# Patient Record
Sex: FEMALE | Race: WHITE | NOT HISPANIC OR LATINO | Employment: FULL TIME | URBAN - METROPOLITAN AREA
[De-identification: names, ages, dates, MRNs, and addresses within clinical notes are randomized per-mention and may not be internally consistent; named-entity substitution may affect disease eponyms.]

---

## 2017-01-20 ENCOUNTER — LAB CONVERSION - ENCOUNTER (OUTPATIENT)
Dept: OTHER | Facility: OTHER | Age: 42
End: 2017-01-20

## 2017-01-20 LAB
25(OH)D3 SERPL-MCNC: 18 NG/ML (ref 30–100)
TSH SERPL DL<=0.05 MIU/L-ACNC: 2.68 MIU/L

## 2017-01-24 ENCOUNTER — GENERIC CONVERSION - ENCOUNTER (OUTPATIENT)
Dept: OTHER | Facility: OTHER | Age: 42
End: 2017-01-24

## 2017-02-01 ENCOUNTER — ALLSCRIPTS OFFICE VISIT (OUTPATIENT)
Dept: OTHER | Facility: OTHER | Age: 42
End: 2017-02-01

## 2017-03-29 ENCOUNTER — ALLSCRIPTS OFFICE VISIT (OUTPATIENT)
Dept: OTHER | Facility: OTHER | Age: 42
End: 2017-03-29

## 2017-04-05 ENCOUNTER — TRANSCRIBE ORDERS (OUTPATIENT)
Dept: ADMINISTRATIVE | Facility: HOSPITAL | Age: 42
End: 2017-04-05

## 2017-04-05 DIAGNOSIS — R92.8 ABNORMAL MAMMOGRAM, UNSPECIFIED: ICD-10-CM

## 2017-04-05 DIAGNOSIS — Z12.31 VISIT FOR SCREENING MAMMOGRAM: Primary | ICD-10-CM

## 2017-05-05 ENCOUNTER — HOSPITAL ENCOUNTER (OUTPATIENT)
Dept: MAMMOGRAPHY | Facility: CLINIC | Age: 42
Discharge: HOME | End: 2017-05-05
Payer: COMMERCIAL

## 2017-05-05 ENCOUNTER — HOSPITAL ENCOUNTER (OUTPATIENT)
Dept: ULTRASOUND IMAGING | Facility: CLINIC | Age: 42
Discharge: HOME | End: 2017-05-05
Payer: COMMERCIAL

## 2017-05-05 DIAGNOSIS — R92.8 OTHER ABNORMAL AND INCONCLUSIVE FINDINGS ON DIAGNOSTIC IMAGING OF BREAST: ICD-10-CM

## 2017-05-05 DIAGNOSIS — Z12.31 ENCOUNTER FOR SCREENING MAMMOGRAM FOR MALIGNANT NEOPLASM OF BREAST: ICD-10-CM

## 2017-05-05 PROCEDURE — G0206 DX MAMMO INCL CAD UNI: HCPCS

## 2017-05-05 PROCEDURE — 76642 ULTRASOUND BREAST LIMITED: CPT

## 2017-05-05 PROCEDURE — G0202 SCR MAMMO BI INCL CAD: HCPCS

## 2017-06-01 ENCOUNTER — GENERIC CONVERSION - ENCOUNTER (OUTPATIENT)
Dept: OTHER | Facility: OTHER | Age: 42
End: 2017-06-01

## 2017-12-30 ENCOUNTER — LAB CONVERSION - ENCOUNTER (OUTPATIENT)
Dept: OTHER | Facility: OTHER | Age: 42
End: 2017-12-30

## 2017-12-30 LAB — 25(OH)D3 SERPL-MCNC: 20 NG/ML (ref 30–100)

## 2018-01-13 VITALS
BODY MASS INDEX: 42.03 KG/M2 | DIASTOLIC BLOOD PRESSURE: 90 MMHG | OXYGEN SATURATION: 91 % | HEART RATE: 98 BPM | HEIGHT: 59 IN | WEIGHT: 208.5 LBS | SYSTOLIC BLOOD PRESSURE: 140 MMHG | RESPIRATION RATE: 16 BRPM | TEMPERATURE: 98.3 F

## 2018-01-13 NOTE — MISCELLANEOUS
Message   Recorded as Task   Date: 06/01/2017 01:32 PM, Created By: Sara Hawkins   Task Name: Med Renewal Request   Assigned To: Honorio Ryan   Regarding Patient: Izaiah Andrew, Status: Active   CommentEsmond Marchi - 01 Jun 2017 1:32 PM     TASK CREATED  Caller: Self; [phone] (Home); [phone] (Work)  rx cfor depo called to [employer] Problems    1  Asthma (493 90) (J45 909)   2  Classic migraine with aura (346 00) (G43 109)   3  Dyslipidemia (272 4) (E78 5)   4  Encounter for gynecological examination without abnormal finding (V72 31) (Z01 419)   5  Encounter for PPD test (V74 1) (Z11 1)   6  Encounter for screening mammogram for malignant neoplasm of breast (V76 12)   (Z12 31)   7  Factor V Leiden mutation (289 81) (D68 51)   8  HTN (hypertension) (401 9) (I10)   9  Hyperglycemia (790 29) (R73 9)   10  Morbid or severe obesity due to excess calories (278 01) (E66 01)   11  Need for immunization against influenza (V04 81) (Z23)   12  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 629)   13  Vitamin D deficiency (268 9) (E55 9)   14  Weight gain (783 1) (R63 5)    Current Meds   1  Calcium Citrate +D 315-250 MG-UNIT Oral Tablet; 2 TABS BID; Therapy: 44Eoy4341 to (Last Rx:40Ajf7711)  Requested for: 67Jtr9192 Ordered   2  MedroxyPROGESTERone Acetate 150 MG/ML Intramuscular Suspension   (Depo-Provera); INJECT 1 ML INTRAMUSCULARLY ONCE EVERY 3   MONTHS; Therapy: 47DXL0725 to (Last Rx:08Mar2016)  Requested for: 37XGM4807 Ordered   3  Naproxen 500 MG Oral Tablet; TAKE 1 TABLET TWICE DAILY AS NEEDED; Therapy: 93LOZ0537 to (Evaluate:12Oct2016)  Requested for: 64UWU5857; Last   Rx:85Duw9903 Ordered   4  ProAir  (90 Base) MCG/ACT Inhalation Aerosol Solution; INHALE 2 PUFFS   EVERY 4 HOURS AS NEEDED FOR COUGH AND WHEEZE;   Therapy: 89SYQ7524 to (Evaluate:86Eql3369)  Requested for: 40WBN9230; Last   Rx:55Ovn8019 Ordered   5  Red Yeast Rice 600 MG Oral Tablet;    Therapy: 38OBI6033 to Recorded   6  Vitamin D (Ergocalciferol) 13897 UNIT Oral Capsule; TAKE 1 CAPSULE WEEKLY; Therapy: 69PWX0907 to (Raj Alex)  Requested for: 13BXH3675; Last   Rx:01Feb2017 Ordered   7  Vitamin D3 1000 UNIT Oral Tablet; 3 Tabs Daily; Therapy: 94GKS0384 to (Last Rx:01Feb2017)  Requested for: 01Feb2017 Ordered    Allergies    1  Dilantin CAPS   2  Keflex TABS   3  Ansaid TABS   4   TEGretol TABS    Signatures   Electronically signed by : Job Wetzel, ; Jun 1 2017  1:32PM EST                       (Author)

## 2018-01-14 VITALS
SYSTOLIC BLOOD PRESSURE: 134 MMHG | BODY MASS INDEX: 42.33 KG/M2 | WEIGHT: 210 LBS | HEIGHT: 59 IN | DIASTOLIC BLOOD PRESSURE: 82 MMHG

## 2018-01-16 NOTE — MISCELLANEOUS
Message   Recorded as Task   Date: 09/30/2016 02:08 PM, Created By: Alyssa Issa   Task Name: Go to Result   Assigned To: Inés [facility],Team   Regarding Patient: Neelam Brooks, Status: In Progress   Comment:    Rachelle Rebollar - 30 Sep 2016 2:08 PM     TASK CREATED  right dx mammo and US in six months  left screening mammo in six months    please send slip  thx   Olivia Bueno - 30 Sep 2016 2:27 PM     TASK IN PROGRESS   Olivia Bueno - 30 Sep 2016 2:56 PM     TASK EDITED           unable to reach pt re teodoro  no vm    order placed in allscripts for 6 month f/u rt diag teodoro and u/s, and 6 mo f/u rt screenuing teodoro        Active Problems    1  Acute medial meniscus tear of left knee (836 0) (S83 242A)   2  Acute vaginitis (616 10) (N76 0)   3  Arthralgia (719 40) (M25 50)   4  Asthma (493 90) (J45 909)   5  Classic migraine with aura (346 00) (G43 109)   6  Dyslipidemia (272 4) (E78 5)   7  Encounter for gynecological examination without abnormal finding (V72 31) (Z01 419)   8  Encounter for screening mammogram for malignant neoplasm of breast (V76 12)   (Z12 31)   9  Factor V Leiden mutation (289 81) (D68 51)   10  Fracture of patella (822 0) (S82 009A)   11  HTN (hypertension) (401 9) (I10)   12  Hyperglycemia (790 29) (R73 9)   13  Iliotibial band syndrome of left side (728 89) (M76 32)   14  Left knee pain (719 46) (M25 562)   15  Mammogram abnormal (793 80) (R92 8)   16  Morbid or severe obesity due to excess calories (278 01) (E66 01)   17  Muscle ache (729 1) (M79 1)   18  Patellofemoral arthritis of left knee (716 96) (M19 90)   19  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 629)   20  Vitamin D deficiency (268 9) (E55 9)   21  Weight gain (783 1) (R63 5)    Current Meds   1  AmLODIPine Besylate 10 MG Oral Tablet; TAKE 1 TABLET DAILY; Therapy: 23Vme9671 to (Evaluate:23Apr2016)  Requested for: 23Feb2016; Last   Rx:23Feb2016 Ordered   2  Calcium Citrate +D 315-250 MG-UNIT Oral Tablet; 2 TABS BID;    Therapy: 13Sep2013 to (Last Rx:65Ska5080)  Requested for: 10Ofu6736 Ordered   3  Fluconazole 150 MG Oral Tablet (Diflucan); one pill stat and repeat dose in 5 days; Therapy: 32XJA6246 to (Last Rx:09Oct2015) Ordered   4  MedroxyPROGESTERone Acetate 150 MG/ML Intramuscular Suspension   (Depo-Provera); INJECT 1 ML INTRAMUSCULARLY ONCE EVERY 3   MONTHS; Therapy: 07HIJ7149 to (Last Rx:08Mar2016)  Requested for: 58TOL7940 Ordered   5  MedroxyPROGESTERone Acetate 150 MG/ML Intramuscular Suspension; inject 1 ml   intramuscularly once every 3 months; Therapy: 60TQC1714 to (Tian Spence)  Requested for: 07YIS1825; Last   Rx:49Phs2244 Ordered   6  Naproxen 500 MG Oral Tablet; TAKE 1 TABLET TWICE DAILY AS NEEDED; Therapy: 26BQJ5460 to (Evaluate:12Oct2016)  Requested for: 73Tfc8138; Last   Rx:05Wdx0808 Ordered   7  Omeprazole 20 MG Oral Capsule Delayed Release; TAKE 1 CAPSULE DAILY; Therapy: 43BXX1651 to [phone])  Requested for: 58OKG6052; Last   Rx:41Mgw7896 Ordered   8  ProAir  (90 Base) MCG/ACT Inhalation Aerosol Solution; INHALE 2 PUFFS   EVERY 4 HOURS AS NEEDED FOR COUGH AND WHEEZE;   Therapy: 97JLH3916 to (Evaluate:09Vhg4175)  Requested for: 46QCQ3566; Last   Rx:44Fwn7850 Ordered   9  Red Yeast Rice 600 MG Oral Tablet; Therapy: 07FSH4592 to Recorded   10  Vitamin D3 1000 UNIT Oral Tablet; TAKE 1 TABLET DAILY; Therapy: 91Exf2278 to (Daniel Hill)  Requested for: 64XXV4247; Last    Rx:13Jke7522 Ordered    Allergies    1  Dilantin CAPS   2  Keflex TABS   3  Ansaid TABS   4   TEGretol TABS    Plan  Encounter for screening mammogram for malignant neoplasm of breast    · MAMMO SCREENING LEFT W CAD; Status:Hold For - Scheduling; Requested  for:30Apr2017;     Signatures   Electronically signed by : Seth Estes, ; Sep 30 2016  2:56PM EST                       (Author)

## 2018-01-17 NOTE — MISCELLANEOUS
Message  left message - want to tx with ergocalciferol x 3 months and also want to start lipitor      Signatures   Electronically signed by : Pallavi Carrillo DO; Jan 24 2017 12:11PM EST                       (Author)

## 2018-01-23 NOTE — PROGRESS NOTES
Assessment   1  Encounter for preventive health examination (V70 0) (Z00 00)  2  Dyslipidemia (272 4) (E78 5)  3  Asthma (493 90) (J45 909)    Plan  Dyslipidemia    · (1) LIPID PANEL, FASTING; Status:Active; Requested for:16Nov2016; 1   HTN (hypertension)    · (1) CBC/PLT/DIFF; Status:Active; Requested for:16Nov2016; 1   HTN (hypertension), Hyperglycemia    · (1) COMPREHENSIVE METABOLIC PANEL; Status:Active; Requested for:16Nov2016; 1   Morbid or severe obesity due to excess calories    · (1) T4, FREE; Status:Active; Requested for:16Nov2016; 1    · (1) TSH; Status:Active; Requested for:16Nov2016; 1   PMH: Acute bronchitis, Asthma    · Continue: ProAir  (90 Base) MCG/ACT Inhalation Aerosol Solution; INHALE 2  PUFFS EVERY 4 HOURS AS NEEDED FOR COUGH AND WHEEZE  PMH: Acute medial meniscus tear of left knee    · Continue: Naproxen 500 MG Oral Tablet; TAKE 1 TABLET TWICE DAILY AS NEEDED  Vitamin D deficiency    · (1) VITAMIN D 25-HYDROXY; Status:Active; Requested for:16Nov2016; 1      1 Amended By: Kimmy Singh; Nov 16 2016 1:28 PM EST    Discussion/Summary  health maintenance visit the risks and benefits of cervical cancer screening were discussed cervical cancer screening is current Breast cancer screening: the risks and benefits of breast cancer screening were discussed and mammogram has been ordered  Colorectal cancer screening: the risks and benefits of colorectal cancer screening were discussed and colorectal cancer screening is current  Osteoporosis screening: the risks and benefits of osteoporosis screening were discussed  The  risks and benefits of immunizations were discussed1  1   Patient discussion: discussed with the patient  Flu vaccine given1        1 Amended By: Kimmy Singh; Nov 16 2016 1:37 PM EST    Chief Complaint  patient presented here for physical      History of Present Illness  HM, Adult Female: The patient is being seen for a health maintenance evaluation     Social History: Household members include spouse and 1 son(s)  She is   Work status: working full time  The patient has never smoked cigarettes  She reports frequent alcohol use  She has never used illicit drugs  General Health: The patient's health since the last visit is described as fair  She has regular dental visits  She denies vision problems  She denies hearing loss  Lifestyle:  She consumes a diverse and healthy diet  She has weight concerns  She exercises regularly  She does not use tobacco  She consumes alcohol  She denies drug use  Reproductive health: the patient is premenopausal    Screening: Cervical cancer screening includes a pap smear performed   Breast cancer screening includes a mammogram performed   Colorectal cancer screening includes a colonoscopy performed   Metabolic screening includes uncertain timing of her last lipid profile, uncertain timing of her last glucose screening and uncertain timing of her last thyroid function test      Cardiovascular risk factors: obesity, but no tobacco use, no illicit drug use and no sedentary lifestyle  General health risks: no asbestos exposure and no radiation exposure  Safety elements used: safe driving habits, smoke detector, carbon monoxide detector, gun trigger locks, gun safe, CPR training for the patient and CPR training for household members  Risk findings: guns at home  Review of Systems    Constitutional: No fever, no chills, feels well, no tiredness, no recent weight gain or weight loss  Eyes: No complaints of eye pain, no red eyes, no eyesight problems, no discharge, no dry eyes, no itching of eyes  ENT: no complaints of earache, no loss of hearing, no nose bleeds, no nasal discharge, no sore throat, no hoarseness  Cardiovascular: No complaints of slow heart rate, no fast heart rate, no chest pain, no palpitations, no leg claudication, no lower extremity edema     Respiratory: No complaints of shortness of breath, no wheezing, no cough, no SOB on exertion, no orthopnea, no PND  Gastrointestinal:1  No complaints of abdominal pain, no constipation, no nausea or vomiting, no diarrhea, no bloody stools1         1 Amended By: Cornelius Del Cid; Nov 16 2016 1:35 PM EST    Active Problems   1  Asthma (493 90) (J45 909)  2  Classic migraine with aura (346 00) (G43 109)  3  Dyslipidemia (272 4) (E78 5)  4  Encounter for gynecological examination without abnormal finding (V72 31) (Z01 419)  5  Encounter for screening mammogram for malignant neoplasm of breast (V76 12)   (Z12 31)  6  Factor V Leiden mutation (289 81) (D68 51)  7  HTN (hypertension) (401 9) (I10)  8  Hyperglycemia (790 29) (R73 9)  9  Mammogram abnormal (793 80) (R92 8)  10  Morbid or severe obesity due to excess calories (278 01) (E66 01)  11  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 629)  12  Vitamin D deficiency (268 9) (E55 9)  13   Weight gain (783 1) (R63 5)    Past Medical History    · History of Abnormal mammogram (793 80) (R92 8)   · History of Acute bronchitis (466 0) (J20 9)   · History of Acute medial meniscus tear of left knee (836 0) (Y53 284A)   · History of Acute sinusitis (461 9) (J01 90)   · History of Acute vaginitis (616 10) (N76 0)   · History of Amenorrhea (626 0) (N91 2)   · History of Arthralgia (719 40) (M25 50)   · History of Cellulitis (682 9) (L03 90)   · History of Encounter for surveillance of contraceptive pills (V25 41) (Z30 41)   · History of Epilepsy (345 90)   · History of Factor V Leiden mutation (289 81) (D68 51)   · History of acute bronchitis (V12 69) (Z87 09)   · History of acute sinusitis (V12 69) (Z87 09)   · History of complex partial epilepsy (V12 49) (Z86 69)   · History of fracture of patella (V15 51) (Z87 81)   · History of upper respiratory infection (V12 09) (Z87 09)   · History of upper respiratory infection (V12 09) (Z87 09)   · History of Iliotibial band syndrome of left side (728 89) (M76 32)   · History of Left knee pain (719 46) (M22 562)   · History of Muscle ache (729 1) (M79 1)   · History of Patellofemoral arthritis of left knee (716 96) (M19 90)   · History of Previous Pregnancies Resulted In 1  Living Children   · History of Screening for human papillomavirus (HPV) (V73 81) (Z11 51)   · History of Summary Of Previous Pregnancies  1  (Total No )   · History of Urinary tract infection (599 0) (N39 0)   · History of Vaginal candidiasis (112 1) (B37 3)    Surgical History    · History of Back Surgery   · History of Nasal Septal Deviation Repair   · History of Rhinoplasty    Family History  Mother    · Family history of Family Health Status Of Mother - Alive  Father    · Family history of Acute Myocardial Infarction (V17 3)   · Family history of Colon Cancer (V16 0)   · Family history of Family Health Status Of Father - Alive  Paternal Grandmother    · Family history of Breast Cancer (V16 3)   · Family history of Colon Cancer (V16 0)  Paternal Grandfather    · Family history of Gastric Cancer (V16 0)  Maternal Aunt    · Family history of Breast Cancer (V16 3)    Social History    · Being A Social Drinker   · Denied: History of Drug use   · Never A Smoker    Current Meds  1  Calcium Citrate +D 315-250 MG-UNIT Oral Tablet; 2 TABS BID; Therapy: 62Jwc6484 to (Last Rx:38Sxj9060)  Requested for: 14Ntj7243 Ordered  2  MedroxyPROGESTERone Acetate 150 MG/ML Intramuscular Suspension; INJECT 1 ML   INTRAMUSCULARLY ONCE EVERY 3 MONTHS; Therapy: 63XPL9540 to (Last Rx:2016)  Requested for: 35DTA5051 Ordered  3  Naproxen 500 MG Oral Tablet; TAKE 1 TABLET TWICE DAILY AS NEEDED; Therapy: 98KYP5463 to (Evaluate:2016)  Requested for: 96Nvv4988; Last   Rx:81Foh5798 Ordered  4  ProAir  (90 Base) MCG/ACT Inhalation Aerosol Solution; INHALE 2 PUFFS   EVERY 4 HOURS AS NEEDED FOR COUGH AND WHEEZE;   Therapy: 62DXG4872 to (Evaluate:59Nbk6172)  Requested for: 49XQV8758; Last   Rx:44Uux2030 Ordered  5  Red Yeast Rice 600 MG Oral Tablet;    Therapy: 38LUY3517 to Recorded  6  Vitamin D3 1000 UNIT Oral Tablet; TAKE 1 TABLET DAILY; Therapy: 47Ixc3457 to (Emma Chencho)  Requested for: 85QZM6673; Last   Rx:29Sep2014 Ordered    Allergies   1  Dilantin CAPS  2  Keflex TABS  3  Ansaid TABS  4  TEGretol TABS    Vitals   Recorded: 01QFK8401 32:73SM   Systolic 648, LUE, Sitting   Diastolic 86, LUE, Sitting   Heart Rate 80   Pulse Quality Normal   Respiration Quality Normal   Respiration 16   Temperature 98 8 F, Tympanic   O2 Saturation 98   Height 4 ft 11 in   Weight 206 lb 4 oz   BMI Calculated 41 66   BSA Calculated 1 87     Physical Exam    Constitutional   General appearance: No acute distress, well appearing and well nourished  Head and Face   Head and face: Normal     Palpation of the face and sinuses: No sinus tenderness  Eyes   Conjunctiva and lids: No swelling, erythema or discharge  Pupils and irises: Equal, round, reactive to light  Ophthalmoscopic examination: Normal fundi and optic discs  Ears, Nose, Mouth, and Throat   External inspection of ears and nose: Normal     Otoscopic examination: Tympanic membranes translucent with normal light reflex  Canals patent without erythema  Hearing: Normal     Nasal mucosa, septum, and turbinates: Normal without edema or erythema  Lips, teeth, and gums: Normal, good dentition  Oropharynx: Normal with no erythema, edema, exudate or lesions  Neck   Neck: Supple, symmetric, trachea midline, no masses  Thyroid: Normal, no thyromegaly  Pulmonary   Respiratory effort: No increased work of breathing or signs of respiratory distress  Palpation of chest: Normal     Auscultation of lungs: Clear to auscultation  Procedure    Procedure: Audiometry: Normal bilaterally  Hearing in the right ear: 20 decibals at 500 hertz, 20 decibals at 1000 hertz, 20 decibals at 2000 hertz and 20 decibals at 4000 hertz     Hearing in the left ear: 20 decibals at 500 hertz, 20 decibals at 1000 hertz, 20 decibals at 2000 hertz and 20 decibals at 4000 hertz        Procedure:   Results: 20/20 in both eyes with corrective device, 20/20 in the right eye with corrective device, 20/20 in the left eye with corrective device      Future Appointments    Date/Time Provider Specialty Site   03/27/2017 01:40 PM Navin Johnston [facility]Gyne[facility]     Signatures   Electronically signed by : Endy Quintero DO; Nov 16 2016  1:37PM EST                       (Author)

## 2018-02-19 ENCOUNTER — OFFICE VISIT (OUTPATIENT)
Dept: FAMILY MEDICINE CLINIC | Facility: CLINIC | Age: 43
End: 2018-02-19
Payer: COMMERCIAL

## 2018-02-19 VITALS
HEIGHT: 59 IN | HEART RATE: 100 BPM | BODY MASS INDEX: 43.71 KG/M2 | OXYGEN SATURATION: 97 % | SYSTOLIC BLOOD PRESSURE: 126 MMHG | WEIGHT: 216.8 LBS | TEMPERATURE: 97.4 F | DIASTOLIC BLOOD PRESSURE: 82 MMHG

## 2018-02-19 DIAGNOSIS — S20.212A CONTUSION OF RIB ON LEFT SIDE, INITIAL ENCOUNTER: ICD-10-CM

## 2018-02-19 DIAGNOSIS — Z00.00 ENCOUNTER FOR PREVENTIVE HEALTH EXAMINATION: Primary | ICD-10-CM

## 2018-02-19 DIAGNOSIS — R10.13 DYSPEPSIA: ICD-10-CM

## 2018-02-19 DIAGNOSIS — R42 VERTIGO: ICD-10-CM

## 2018-02-19 DIAGNOSIS — J06.9 UPPER RESPIRATORY TRACT INFECTION, UNSPECIFIED TYPE: ICD-10-CM

## 2018-02-19 PROCEDURE — 1036F TOBACCO NON-USER: CPT | Performed by: INTERNAL MEDICINE

## 2018-02-19 PROCEDURE — 3008F BODY MASS INDEX DOCD: CPT | Performed by: INTERNAL MEDICINE

## 2018-02-19 PROCEDURE — 99396 PREV VISIT EST AGE 40-64: CPT | Performed by: INTERNAL MEDICINE

## 2018-02-19 PROCEDURE — 99214 OFFICE O/P EST MOD 30 MIN: CPT | Performed by: INTERNAL MEDICINE

## 2018-02-19 RX ORDER — MEDROXYPROGESTERONE ACETATE 150 MG/ML
1 INJECTION, SUSPENSION INTRAMUSCULAR
Refills: 3 | COMMUNITY
Start: 2018-01-03 | End: 2019-01-08 | Stop reason: SDUPTHER

## 2018-02-19 RX ORDER — ALBUTEROL SULFATE 90 UG/1
2 AEROSOL, METERED RESPIRATORY (INHALATION) EVERY 4 HOURS PRN
COMMUNITY
Start: 2013-12-26 | End: 2021-05-10 | Stop reason: SDUPTHER

## 2018-02-19 RX ORDER — AZITHROMYCIN 250 MG/1
TABLET, FILM COATED ORAL
Qty: 6 TABLET | Refills: 0 | Status: SHIPPED | OUTPATIENT
Start: 2018-02-19 | End: 2018-02-21

## 2018-02-19 RX ORDER — CYANOCOBALAMIN (VITAMIN B-12) 1000 MCG
2 TABLET, EXTENDED RELEASE ORAL 2 TIMES DAILY
COMMUNITY
Start: 2013-09-13 | End: 2019-05-22

## 2018-02-19 RX ORDER — ERGOCALCIFEROL 1.25 MG/1
1 CAPSULE ORAL WEEKLY
COMMUNITY
Start: 2017-02-01 | End: 2018-02-19

## 2018-02-19 RX ORDER — NAPROXEN 500 MG/1
500 TABLET ORAL 2 TIMES DAILY PRN
Refills: 3 | COMMUNITY
Start: 2018-01-30 | End: 2018-04-09 | Stop reason: SDUPTHER

## 2018-02-19 RX ORDER — MECLIZINE HCL 12.5 MG/1
12.5 TABLET ORAL 3 TIMES DAILY PRN
Qty: 30 TABLET | Refills: 0 | Status: SHIPPED | OUTPATIENT
Start: 2018-02-19 | End: 2018-03-23 | Stop reason: SDUPTHER

## 2018-02-19 RX ORDER — UBIDECARENONE 50 MG
CAPSULE ORAL
COMMUNITY
Start: 2014-09-29

## 2018-02-19 RX ORDER — OMEPRAZOLE 20 MG/1
20 CAPSULE, DELAYED RELEASE ORAL DAILY
Qty: 90 CAPSULE | Refills: 0 | Status: SHIPPED | OUTPATIENT
Start: 2018-02-19 | End: 2018-05-17 | Stop reason: SDUPTHER

## 2018-02-19 NOTE — PATIENT INSTRUCTIONS
Discussed to use naproxen x 7-10 days  She will take ppi for gi protection  Add lidoderm patch as well    tx vertigo and uri

## 2018-02-19 NOTE — PROGRESS NOTES
Assessment/Plan:         Diagnoses and all orders for this visit:    Encounter for preventive health examination  Comments:  filled form that she had preventitive exam    Contusion of rib on left side, initial encounter  Comments:  nsaid, lidoderm    Dyspepsia  Comments:  ppi with nsaid for gi protection  Orders:  -     omeprazole (PriLOSEC) 20 mg delayed release capsule; Take 1 capsule (20 mg total) by mouth daily    Upper respiratory tract infection, unspecified type  Comments:  z-ramu  Orders:  -     azithromycin (ZITHROMAX) 250 mg tablet; Take 2 tablets today then 1 tablet daily x 4 days  -     meclizine (ANTIVERT) 12 5 MG tablet; Take 1 tablet (12 5 mg total) by mouth 3 (three) times a day as needed for dizziness (do not drive with med)    Vertigo  Comments:  prn antivert  she knows to not drive with med    Other orders  -     Calcium Citrate-Vitamin D (CALCIUM CITRATE + D) 315-250 MG-UNIT TABS; Take 2 tablets by mouth 2 (two) times a day  -     MedroxyPROGESTERone Acetate 150 MG/ML ROBB; Inject 1 mL into the shoulder, thigh, or buttocks every 3 (three) months  -     naproxen (NAPROSYN) 500 mg tablet; 500 mg 2 (two) times a day as needed  -     albuterol (PROAIR HFA) 90 mcg/act inhaler; Inhale 2 puffs every 4 (four) hours as needed  -     Red Yeast Rice 600 MG TABS; Take by mouth  -     Discontinue: ergocalciferol (VITAMIN D2) 50,000 units; Take 1 capsule by mouth once a week          Subjective:      Patient ID: Lizbeth Mno is a 43 y o  female  Pt states 2 weeks ago she was laughing hard and 2 days later her left ribs were sore  +hurts to take a deep breath  Hurts to lay on the ribs  Denies fall/mva/coughing  She tried naproxen and no relief  She tried her husbands tylenol with codein and no relief  The following portions of the patient's history were reviewed and updated as appropriate: She  has a past medical history of Complex partial epilepsy ([facility] [address] ); Epilepsy ([facility] [address] );  Fracture, patella; and Pregnancy  She  does not have any pertinent problems on file  She  has a past surgical history that includes Back surgery; Nasal septum surgery; and Rhinoplasty  Her family history includes Breast cancer in her maternal aunt and paternal grandmother; Colon cancer in her father and paternal grandmother; Heart attack in her father; Heart disease in her father; Hypertension in her father; Stomach cancer in her paternal grandfather  She  reports that she has never smoked  She has never used smokeless tobacco  She reports that she drinks alcohol  She reports that she does not use drugs  Current Outpatient Prescriptions   Medication Sig Dispense Refill    albuterol (PROAIR HFA) 90 mcg/act inhaler Inhale 2 puffs every 4 (four) hours as needed      Calcium Citrate-Vitamin D (CALCIUM CITRATE + D) 315-250 MG-UNIT TABS Take 2 tablets by mouth 2 (two) times a day      Red Yeast Rice 600 MG TABS Take by mouth      azithromycin (ZITHROMAX) 250 mg tablet Take 2 tablets today then 1 tablet daily x 4 days 6 tablet 0    meclizine (ANTIVERT) 12 5 MG tablet Take 1 tablet (12 5 mg total) by mouth 3 (three) times a day as needed for dizziness (do not drive with med) 30 tablet 0    MedroxyPROGESTERone Acetate 150 MG/ML ROBB Inject 1 mL into the shoulder, thigh, or buttocks every 3 (three) months  3    naproxen (NAPROSYN) 500 mg tablet 500 mg 2 (two) times a day as needed  3    omeprazole (PriLOSEC) 20 mg delayed release capsule Take 1 capsule (20 mg total) by mouth daily 90 capsule 0     No current facility-administered medications for this visit  No current outpatient prescriptions on file prior to visit  No current facility-administered medications on file prior to visit  She is allergic to carbamazepine; cephalexin; flurbiprofen; and phenytoin       Review of Systems   Constitutional: Negative  HENT: Negative  Cardiovascular: Negative  Gastrointestinal: Negative  Objective:      /82 (BP Location: Left arm, Patient Position: Sitting, Cuff Size: Standard)   Pulse 100   Temp (!) 97 4 °F (36 3 °C)   Ht 4' 11" (1 499 m)   Wt 98 3 kg (216 lb 12 8 oz)   SpO2 97%   BMI 43 79 kg/m²   General appearance - alert, well appearing, and in no distress    Mental Status - alert, oriented to person, place, and time    Eyes - pupils equal and reactive, extraocular eye movements intact, +horiz nystagmus    Ears - bilateral TM's and external ear canals normal     Nose - normal and patent, no erythema, discharge or polyps     Sinuses - Normal paranasal sinuses without tenderness     Throat - mucous membranes moist, pharynx normal without lesions     Neck - supple, no significant adenopathy     Thyroid - thyroid is normal in size without nodules or tenderness      Chest - clear to auscultation, no wheezes, rales or rhonchi, symmetric air entry     Heart - normal rate, regular rhythm, normal S1, S2, no murmurs, rubs, clicks or gallops     Abdomen - soft, nontender, nondistended, no masses or organomegaly     Breasts - breasts appear normal, no suspicious masses, no skin or nipple changes or axillary nodes, patient declines to have breast exam       Tenderness over palp of left low costochondral jt and mid chest        Physical Exam   Constitutional: She appears well-developed and well-nourished  HENT:   Head: Normocephalic and atraumatic  Neck: Normal range of motion  Neck supple  Cardiovascular: Normal rate and regular rhythm      Pulmonary/Chest: Effort normal and breath sounds normal

## 2018-03-23 DIAGNOSIS — J06.9 UPPER RESPIRATORY TRACT INFECTION, UNSPECIFIED TYPE: ICD-10-CM

## 2018-03-23 RX ORDER — MECLIZINE HCL 12.5 MG/1
TABLET ORAL
Qty: 30 TABLET | Refills: 0 | Status: SHIPPED | OUTPATIENT
Start: 2018-03-23 | End: 2019-05-22 | Stop reason: SDUPTHER

## 2018-04-09 DIAGNOSIS — M19.90 OSTEOARTHRITIS, UNSPECIFIED OSTEOARTHRITIS TYPE, UNSPECIFIED SITE: Primary | ICD-10-CM

## 2018-04-09 RX ORDER — NAPROXEN 500 MG/1
500 TABLET ORAL 2 TIMES DAILY WITH MEALS
Qty: 60 TABLET | Refills: 0 | Status: SHIPPED | OUTPATIENT
Start: 2018-04-09 | End: 2018-05-22 | Stop reason: SDUPTHER

## 2018-05-17 DIAGNOSIS — R10.13 DYSPEPSIA: ICD-10-CM

## 2018-05-17 RX ORDER — OMEPRAZOLE 20 MG/1
20 CAPSULE, DELAYED RELEASE ORAL DAILY
Qty: 90 CAPSULE | Refills: 0 | Status: SHIPPED | OUTPATIENT
Start: 2018-05-17 | End: 2018-08-20 | Stop reason: SDUPTHER

## 2018-05-22 DIAGNOSIS — M19.90 OSTEOARTHRITIS, UNSPECIFIED OSTEOARTHRITIS TYPE, UNSPECIFIED SITE: ICD-10-CM

## 2018-05-23 RX ORDER — NAPROXEN 500 MG/1
500 TABLET ORAL 2 TIMES DAILY WITH MEALS
Qty: 60 TABLET | Refills: 0 | Status: SHIPPED | OUTPATIENT
Start: 2018-05-23 | End: 2018-09-24 | Stop reason: SDUPTHER

## 2018-08-20 DIAGNOSIS — R10.13 DYSPEPSIA: ICD-10-CM

## 2018-08-21 RX ORDER — OMEPRAZOLE 20 MG/1
20 CAPSULE, DELAYED RELEASE ORAL DAILY
Qty: 90 CAPSULE | Refills: 1 | Status: SHIPPED | OUTPATIENT
Start: 2018-08-21 | End: 2019-05-22

## 2018-09-24 ENCOUNTER — TELEPHONE (OUTPATIENT)
Dept: FAMILY MEDICINE CLINIC | Facility: CLINIC | Age: 43
End: 2018-09-24

## 2018-09-24 DIAGNOSIS — M19.90 OSTEOARTHRITIS, UNSPECIFIED OSTEOARTHRITIS TYPE, UNSPECIFIED SITE: ICD-10-CM

## 2018-09-24 RX ORDER — NAPROXEN 500 MG/1
500 TABLET ORAL 2 TIMES DAILY WITH MEALS
Qty: 180 TABLET | Refills: 1 | Status: SHIPPED | OUTPATIENT
Start: 2018-09-24 | End: 2019-03-24 | Stop reason: SDUPTHER

## 2018-10-11 RX ORDER — MEDROXYPROGESTERONE ACETATE 150 MG/ML
INJECTION, SUSPENSION INTRAMUSCULAR
Qty: 1 SYRINGE | Refills: 3 | OUTPATIENT
Start: 2018-10-11

## 2018-10-29 NOTE — TELEPHONE ENCOUNTER
Please be sure patient are up to date with appointments prior to sending refill requests  Please call patient and schedule

## 2019-01-08 ENCOUNTER — ANNUAL EXAM (OUTPATIENT)
Dept: OBGYN CLINIC | Facility: MEDICAL CENTER | Age: 44
End: 2019-01-08
Payer: COMMERCIAL

## 2019-01-08 VITALS — WEIGHT: 219.8 LBS | DIASTOLIC BLOOD PRESSURE: 86 MMHG | SYSTOLIC BLOOD PRESSURE: 124 MMHG | BODY MASS INDEX: 44.39 KG/M2

## 2019-01-08 DIAGNOSIS — Z12.31 ENCOUNTER FOR SCREENING MAMMOGRAM FOR MALIGNANT NEOPLASM OF BREAST: Primary | ICD-10-CM

## 2019-01-08 DIAGNOSIS — Z30.42 ENCOUNTER FOR DEPO-PROVERA CONTRACEPTION: ICD-10-CM

## 2019-01-08 DIAGNOSIS — Z01.419 ENCOUNTER FOR GYNECOLOGICAL EXAMINATION WITHOUT ABNORMAL FINDING: ICD-10-CM

## 2019-01-08 PROCEDURE — S0612 ANNUAL GYNECOLOGICAL EXAMINA: HCPCS | Performed by: PHYSICIAN ASSISTANT

## 2019-01-08 PROCEDURE — 87624 HPV HI-RISK TYP POOLED RSLT: CPT | Performed by: PHYSICIAN ASSISTANT

## 2019-01-08 PROCEDURE — G0145 SCR C/V CYTO,THINLAYER,RESCR: HCPCS | Performed by: PHYSICIAN ASSISTANT

## 2019-01-08 RX ORDER — MEDROXYPROGESTERONE ACETATE 150 MG/ML
150 INJECTION, SUSPENSION INTRAMUSCULAR
Qty: 1 ML | Refills: 4 | Status: SHIPPED | OUTPATIENT
Start: 2019-01-08 | End: 2020-05-29 | Stop reason: SDUPTHER

## 2019-01-08 NOTE — PROGRESS NOTES
Assessment/Plan:    Encounter for surveillance of injectable contraceptive  Happy w/ Depo  Denies ACHES  Refill provided  Enc continuation of Ca/Vit D supplement    Encounter for gynecological examination without abnormal finding  I have discussed the importance of monthly self-breast exams, exercise and healthy diet as well as adequate intake of calcium and vitamin D  The patient declines STD testing  The current ASCCP guidelines were reviewed  The low risk patient will receive pap smear screening every 3 years or pap with HPV co-testing every 5 years  The patient previously had PAP in 2015 and is due again today  I emphasized the importance of an annual pelvic and breast exam  A yearly mammogram is recommended for breast cancer screening starting at age 36  All questions have been answered to her satisfaction  Diagnoses and all orders for this visit:    Encounter for screening mammogram for malignant neoplasm of breast  -     Mammo screening bilateral w 3d & cad; Future    Encounter for gynecological examination without abnormal finding  -     Liquid-based pap, screening    Encounter for Depo-Provera contraception  -     medroxyPROGESTERone acetate (DEPO-PROVERA SYRINGE) 150 mg/mL injection; Inject 1 mL (150 mg total) into a muscle every 3 (three) months        Subjective:      Patient ID: Shaun Le is a 37 y o  female  The patient comes in today for annual Gyn exam  The patient has no history of abnormal periods, pelvic pain, abnormal vaginal discharge, breast mass or lumps, urinary symptoms, urinary incontinence, depression, and pelvic/vaginal pressure  Last PAP 2015, WNL  Last mammo - 2017, due for mammo now  Depo for contra - h/o Factor V, migraine w/ aura  Happy w/ contra method, amenorrheic  Denies any recent BTB  Pt reports all additional systems reviewed are negative     The patient admits to monthly self breast exams, regular exercise, healthy diet, contraception use (Depo), and calcium and Vitamin D intake  The following portions of the patient's history were reviewed and updated as appropriate: allergies, current medications, past family history, past medical history, past social history, past surgical history and problem list     Review of Systems   Constitutional: Negative for appetite change, fatigue and unexpected weight change  Respiratory: Negative for chest tightness and shortness of breath  Cardiovascular: Negative for chest pain, palpitations and leg swelling  Gastrointestinal: Negative for abdominal pain, constipation, diarrhea, nausea and vomiting  Genitourinary: Negative for difficulty urinating, dyspareunia, menstrual problem, pelvic pain and vaginal discharge  Musculoskeletal: Negative for arthralgias and myalgias  Neurological: Negative for dizziness, light-headedness and headaches  Psychiatric/Behavioral: Negative for dysphoric mood  The patient is not nervous/anxious  All other systems reviewed and are negative  Objective:      /86   Wt 99 7 kg (219 lb 12 8 oz)   LMP 12/22/2018   Breastfeeding? No   BMI 44 39 kg/m²          Physical Exam   Constitutional: She is oriented to person, place, and time  She appears well-developed and well-nourished  HENT:   Head: Normocephalic and atraumatic  Neck: Neck supple  No thyromegaly present  Cardiovascular: Normal rate and regular rhythm  Pulmonary/Chest: Effort normal and breath sounds normal  Right breast exhibits no inverted nipple, no mass, no nipple discharge, no skin change and no tenderness  Left breast exhibits no inverted nipple, no mass, no nipple discharge, no skin change and no tenderness  Abdominal: Soft  Bowel sounds are normal  Hernia confirmed negative in the right inguinal area and confirmed negative in the left inguinal area  Genitourinary: Vagina normal and uterus normal  No breast swelling, tenderness, discharge or bleeding   There is no rash, tenderness, lesion or injury on the right labia  There is no rash, tenderness, lesion or injury on the left labia  Uterus is not deviated, not enlarged, not fixed and not tender  Cervix exhibits no motion tenderness, no discharge and no friability  Right adnexum displays no mass, no tenderness and no fullness  Left adnexum displays no mass, no tenderness and no fullness  No vaginal discharge found  Genitourinary Comments: Exam limited by body habitus   Neurological: She is alert and oriented to person, place, and time  Skin: Skin is warm and dry  Psychiatric: She has a normal mood and affect  Nursing note and vitals reviewed

## 2019-01-09 LAB
HPV HR 12 DNA CVX QL NAA+PROBE: NEGATIVE
HPV16 DNA CVX QL NAA+PROBE: NEGATIVE
HPV18 DNA CVX QL NAA+PROBE: NEGATIVE

## 2019-01-10 ENCOUNTER — TELEPHONE (OUTPATIENT)
Dept: OBGYN CLINIC | Facility: CLINIC | Age: 44
End: 2019-01-10

## 2019-01-10 LAB
LAB AP GYN PRIMARY INTERPRETATION: NORMAL
Lab: NORMAL

## 2019-01-10 NOTE — TELEPHONE ENCOUNTER
----- Message from Lotus Ch PA-C sent at 1/10/2019  9:45 AM EST -----  Cytology WNL  HPV results neg  Please notify pt  thanks

## 2019-01-10 NOTE — TELEPHONE ENCOUNTER
LMOM today @ [phone] per Pt's signed communication consent form in Pt's EHR  Pt informed, via vm message, that her recent Pap smear test result was normal and HPV was negative per ARGENTINA Lerma's review (Result card mailed to Pt's mailing address listed in Pt's EHR)  Reiterated to Pt, via vm message, that if she has any questions/concerns to contact office

## 2019-03-24 DIAGNOSIS — M19.90 OSTEOARTHRITIS, UNSPECIFIED OSTEOARTHRITIS TYPE, UNSPECIFIED SITE: ICD-10-CM

## 2019-03-26 RX ORDER — NAPROXEN 500 MG/1
TABLET ORAL
Qty: 14 TABLET | Refills: 0 | Status: SHIPPED | OUTPATIENT
Start: 2019-03-26 | End: 2020-02-03 | Stop reason: SDUPTHER

## 2019-05-22 ENCOUNTER — OFFICE VISIT (OUTPATIENT)
Dept: FAMILY MEDICINE CLINIC | Facility: CLINIC | Age: 44
End: 2019-05-22
Payer: COMMERCIAL

## 2019-05-22 VITALS
HEIGHT: 59 IN | SYSTOLIC BLOOD PRESSURE: 126 MMHG | TEMPERATURE: 98.6 F | WEIGHT: 217 LBS | RESPIRATION RATE: 16 BRPM | DIASTOLIC BLOOD PRESSURE: 84 MMHG | HEART RATE: 101 BPM | OXYGEN SATURATION: 98 % | BODY MASS INDEX: 43.75 KG/M2

## 2019-05-22 DIAGNOSIS — E66.01 MORBID OBESITY (HCC): ICD-10-CM

## 2019-05-22 DIAGNOSIS — Z00.00 ANNUAL PHYSICAL EXAM: ICD-10-CM

## 2019-05-22 DIAGNOSIS — J06.9 UPPER RESPIRATORY TRACT INFECTION, UNSPECIFIED TYPE: ICD-10-CM

## 2019-05-22 DIAGNOSIS — J45.909 ASTHMA, UNSPECIFIED ASTHMA SEVERITY, UNSPECIFIED WHETHER COMPLICATED, UNSPECIFIED WHETHER PERSISTENT: ICD-10-CM

## 2019-05-22 DIAGNOSIS — M19.90 ARTHRITIS: Primary | ICD-10-CM

## 2019-05-22 LAB — FEV1: NORMAL LITERS

## 2019-05-22 PROCEDURE — 99396 PREV VISIT EST AGE 40-64: CPT | Performed by: INTERNAL MEDICINE

## 2019-05-22 PROCEDURE — 3008F BODY MASS INDEX DOCD: CPT | Performed by: INTERNAL MEDICINE

## 2019-05-22 PROCEDURE — 94010 BREATHING CAPACITY TEST: CPT | Performed by: INTERNAL MEDICINE

## 2019-05-22 PROCEDURE — 99213 OFFICE O/P EST LOW 20 MIN: CPT | Performed by: INTERNAL MEDICINE

## 2019-05-22 RX ORDER — NAPROXEN 500 MG/1
500 TABLET ORAL 2 TIMES DAILY WITH MEALS
Qty: 180 TABLET | Refills: 1 | Status: SHIPPED | OUTPATIENT
Start: 2019-05-22 | End: 2019-11-30 | Stop reason: SDUPTHER

## 2019-05-22 RX ORDER — ALBUTEROL SULFATE 90 UG/1
2 AEROSOL, METERED RESPIRATORY (INHALATION) EVERY 4 HOURS PRN
Qty: 1 INHALER | Refills: 3 | Status: SHIPPED | OUTPATIENT
Start: 2019-05-22 | End: 2021-05-13

## 2019-05-22 RX ORDER — MECLIZINE HCL 12.5 MG/1
12.5 TABLET ORAL EVERY 8 HOURS PRN
Qty: 30 TABLET | Refills: 0 | Status: SHIPPED | OUTPATIENT
Start: 2019-05-22 | End: 2022-05-12

## 2019-05-22 RX ORDER — IBUPROFEN 200 MG
1 CAPSULE ORAL DAILY
COMMUNITY
End: 2020-03-27 | Stop reason: SDUPTHER

## 2019-05-31 DIAGNOSIS — J45.909 ASTHMA, UNSPECIFIED ASTHMA SEVERITY, UNSPECIFIED WHETHER COMPLICATED, UNSPECIFIED WHETHER PERSISTENT: Primary | ICD-10-CM

## 2019-11-30 DIAGNOSIS — M19.90 ARTHRITIS: ICD-10-CM

## 2019-12-02 ENCOUNTER — TELEPHONE (OUTPATIENT)
Dept: FAMILY MEDICINE CLINIC | Facility: CLINIC | Age: 44
End: 2019-12-02

## 2019-12-02 RX ORDER — NAPROXEN 500 MG/1
TABLET ORAL
Qty: 30 TABLET | Refills: 0 | Status: SHIPPED | OUTPATIENT
Start: 2019-12-02 | End: 2020-03-27 | Stop reason: SDUPTHER

## 2020-02-03 ENCOUNTER — ANNUAL EXAM (OUTPATIENT)
Dept: OBGYN CLINIC | Facility: MEDICAL CENTER | Age: 45
End: 2020-02-03
Payer: COMMERCIAL

## 2020-02-03 VITALS
DIASTOLIC BLOOD PRESSURE: 82 MMHG | HEIGHT: 58 IN | SYSTOLIC BLOOD PRESSURE: 136 MMHG | WEIGHT: 221 LBS | BODY MASS INDEX: 46.39 KG/M2

## 2020-02-03 DIAGNOSIS — Z01.419 ENCOUNTER FOR GYNECOLOGICAL EXAMINATION (GENERAL) (ROUTINE) WITHOUT ABNORMAL FINDINGS: Primary | ICD-10-CM

## 2020-02-03 DIAGNOSIS — Z12.31 ENCOUNTER FOR SCREENING MAMMOGRAM FOR MALIGNANT NEOPLASM OF BREAST: ICD-10-CM

## 2020-02-03 PROCEDURE — 3079F DIAST BP 80-89 MM HG: CPT | Performed by: OBSTETRICS & GYNECOLOGY

## 2020-02-03 PROCEDURE — S0612 ANNUAL GYNECOLOGICAL EXAMINA: HCPCS | Performed by: OBSTETRICS & GYNECOLOGY

## 2020-02-03 PROCEDURE — 3075F SYST BP GE 130 - 139MM HG: CPT | Performed by: OBSTETRICS & GYNECOLOGY

## 2020-02-06 NOTE — PROGRESS NOTES
Jeanette Wray Fenermacher  1975      CC:  Yearly exam    S:  40 y o  female here for yearly exam  She is amenorrheic on depo provera, this is administered to her by her mother who is an RN  She is happy with this contraceptive method and would like to continue  She has multiple contraindications to other forms of contraception due to her Factor V and migraine with aura      Sexual activity: She is sexually active without pain, bleeding or dryness  Contraception: She uses depo  for contraception  Last Pap 2019 - normal cytology, negative hpv  Last Mammo 2017 - negative/benign    We reviewed ASC guidelines for Pap testing today  Family hx of breast cancer: yes  Family hx of ovarian cancer: no  Family hx of colon cancer:  no      Current Outpatient Medications:     albuterol (PROAIR HFA) 90 mcg/act inhaler, Inhale 2 puffs every 4 (four) hours as needed, Disp: , Rfl:     albuterol (PROAIR HFA) 90 mcg/act inhaler, Inhale 2 puffs every 4 (four) hours as needed for wheezing, Disp: 1 Inhaler, Rfl: 3    calcium carbonate (OS-MELISSA) 1250 (500 Ca) MG tablet, Take 1 tablet by mouth daily, Disp: , Rfl:     Cholecalciferol (VITAMIN D PO), Take by mouth, Disp: , Rfl:     meclizine (ANTIVERT) 12 5 MG tablet, Take 1 tablet (12 5 mg total) by mouth every 8 (eight) hours as needed for dizziness, Disp: 30 tablet, Rfl: 0    medroxyPROGESTERone acetate (DEPO-PROVERA SYRINGE) 150 mg/mL injection, Inject 1 mL (150 mg total) into a muscle every 3 (three) months, Disp: 1 mL, Rfl: 4    naproxen (NAPROSYN) 500 mg tablet, TAKE 1 TABLET BY MOUTH TWICE A DAY WITH MEALS, Disp: 30 tablet, Rfl: 0    psyllium (METAMUCIL) 0 52 g capsule, Take 0 52 g by mouth daily, Disp: , Rfl:     Red Yeast Rice 600 MG TABS, Take by mouth, Disp: , Rfl:     Spacer/Aero Chamber Mouthpiece (SPACER DEVICE) for metered dose inhaler, For use with metered dose inhaler   Diamond Hernandez VHC, Disp: 1 Device, Rfl: 0  Social History Socioeconomic History    Marital status:      Spouse name: Not on file    Number of children: Not on file    Years of education: Not on file    Highest education level: Not on file   Occupational History    Not on file   Social Needs    Financial resource strain: Not on file    Food insecurity:     Worry: Not on file     Inability: Not on file    Transportation needs:     Medical: Not on file     Non-medical: Not on file   Tobacco Use    Smoking status: Never Smoker    Smokeless tobacco: Never Used   Substance and Sexual Activity    Alcohol use:  Yes     Alcohol/week: 2 0 standard drinks     Types: 2 Shots of liquor per week     Frequency: 4 or more times a week     Drinks per session: 1 or 2     Binge frequency: Never    Drug use: No    Sexual activity: Not Currently     Partners: Male     Birth control/protection: Injection     Comment:    Lifestyle    Physical activity:     Days per week: Not on file     Minutes per session: Not on file    Stress: Not on file   Relationships    Social connections:     Talks on phone: Not on file     Gets together: Not on file     Attends Shinto service: Not on file     Active member of club or organization: Not on file     Attends meetings of clubs or organizations: Not on file     Relationship status: Not on file    Intimate partner violence:     Fear of current or ex partner: Not on file     Emotionally abused: Not on file     Physically abused: Not on file     Forced sexual activity: Not on file   Other Topics Concern    Not on file   Social History Narrative    Not on file     Family History   Problem Relation Age of Onset    No Known Problems Mother     Heart attack Father     Colon cancer Father     Hypertension Father     Heart disease Father     Breast cancer Paternal Grandmother     Colon cancer Paternal Grandmother     Stomach cancer Paternal Grandfather     Breast cancer Maternal Aunt     No Known Problems Brother     No Known Problems Brother     No Known Problems Brother     Ovarian cancer Neg Hx     Uterine cancer Neg Hx     Cervical cancer Neg Hx       Past Medical History:   Diagnosis Date    Complex partial [facility] ([facility] )     Seisure free since age 25     Epilepsy ([facility] [facility] )     [facility], [facility]     Fracture, patella     Pregnancy     Resulted in 1 Living child         Review of Systems   Respiratory: Negative  Cardiovascular: Negative  Gastrointestinal: Negative for constipation and diarrhea  Genitourinary: Negative for difficulty urinating, pelvic pain, vaginal bleeding, vaginal discharge, itching or odor  O:  Blood pressure 136/82, height 4' 10" (1 473 m), weight 100 kg (221 lb), last menstrual period 12/24/2018, not currently breastfeeding  Patient appears well and is not in distress  Neck is supple without masses  Breasts are symmetrical without mass, tenderness, nipple discharge, skin changes or adenopathy  Abdomen is soft and nontender without masses  External genitals are normal without lesions or rashes  Urethral meatus and urethra are normal  Bladder is normal to palpation  Vagina is normal without discharge or bleeding  Cervix is normal without discharge or lesion  Uterus is normal, mobile, nontender without palpable mass  Adnexa are normal, nontender, without palpable mass  A:  Yearly exam      P:   Pap up to date   Will call when she needs refills of Depo   Mammo slip provided    RTO one year for yearly exam or sooner as needed

## 2020-03-27 ENCOUNTER — OFFICE VISIT (OUTPATIENT)
Dept: FAMILY MEDICINE CLINIC | Facility: CLINIC | Age: 45
End: 2020-03-27
Payer: COMMERCIAL

## 2020-03-27 VITALS
HEIGHT: 58 IN | HEART RATE: 98 BPM | DIASTOLIC BLOOD PRESSURE: 84 MMHG | BODY MASS INDEX: 45.34 KG/M2 | RESPIRATION RATE: 16 BRPM | TEMPERATURE: 98.6 F | SYSTOLIC BLOOD PRESSURE: 122 MMHG | OXYGEN SATURATION: 98 % | WEIGHT: 216 LBS

## 2020-03-27 DIAGNOSIS — E78.5 DYSLIPIDEMIA: ICD-10-CM

## 2020-03-27 DIAGNOSIS — M19.90 ARTHRITIS: ICD-10-CM

## 2020-03-27 DIAGNOSIS — Z00.00 ANNUAL PHYSICAL EXAM: Primary | ICD-10-CM

## 2020-03-27 DIAGNOSIS — R53.83 FATIGUE, UNSPECIFIED TYPE: ICD-10-CM

## 2020-03-27 PROBLEM — G40.909 EPILEPSY (HCC): Status: RESOLVED | Noted: 2020-03-27 | Resolved: 2020-03-27

## 2020-03-27 PROBLEM — G40.209 COMPLEX PARTIAL EPILEPSY (HCC): Status: RESOLVED | Noted: 2020-03-27 | Resolved: 2020-03-27

## 2020-03-27 PROCEDURE — 3008F BODY MASS INDEX DOCD: CPT | Performed by: INTERNAL MEDICINE

## 2020-03-27 PROCEDURE — 99396 PREV VISIT EST AGE 40-64: CPT | Performed by: INTERNAL MEDICINE

## 2020-03-27 RX ORDER — IBUPROFEN 200 MG
1 CAPSULE ORAL DAILY
Qty: 150 TABLET | Refills: 3 | Status: SHIPPED | OUTPATIENT
Start: 2020-03-27

## 2020-03-27 RX ORDER — NAPROXEN 500 MG/1
500 TABLET ORAL 2 TIMES DAILY WITH MEALS
Qty: 30 TABLET | Refills: 3 | Status: SHIPPED | OUTPATIENT
Start: 2020-03-27 | End: 2020-12-11 | Stop reason: SDUPTHER

## 2020-03-27 NOTE — PATIENT INSTRUCTIONS

## 2020-03-27 NOTE — PROGRESS NOTES
[address] [place] [address]    NAME: Mic Cedeno  AGE: 40 y o  SEX: female  : 1975     DATE: 3/27/2020     Assessment and Plan:     Problem List Items Addressed This Visit     None      Visit Diagnoses     Arthritis        prn naprosyn    Relevant Medications    calcium carbonate (OS-MELISSA) 1250 (500 Ca) MG tablet    naproxen (NAPROSYN) 500 mg tablet          Immunizations and preventive care screenings were discussed with patient today  Appropriate education was printed on patient's after visit summary  Counseling:  Dental Health: discussed importance of regular tooth brushing, flossing, and dental visits  · Exercise: the importance of regular exercise/physical activity was discussed  Recommend exercise 3-5 times per week for at least 30 minutes  BMI Counseling: Body mass index is 45 14 kg/m²  The BMI is above normal  Nutrition recommendations include decreasing portion sizes and limiting drinks that contain sugar  Exercise recommendations include exercising 3-5 times per week  No pharmacotherapy was ordered  Patient referred to PCP due to patient being overweight  No follow-ups on file  Chief Complaint:     Chief Complaint   Patient presents with    Annual Exam      History of Present Illness:     Adult Annual Physical   Patient here for a comprehensive physical exam  The patient reports no problems  Diet and Physical Activity  · Diet/Nutrition: well balanced diet  · Exercise: walking  Depression Screening  PHQ-9 Depression Screening    PHQ-9:    Frequency of the following problems over the past two weeks:            General Health  · Sleep: gets 4-6 hours of sleep on average  · Hearing: normal - bilateral   · Vision: no vision problems  · Dental: regular dental visits         /GYN Health  · Patient is: premenopausal  · Last menstrual period: 2020  · Contraceptive method: injectable contraception  Review of Systems:     Review of Systems   Constitutional: Negative  HENT: Negative  Respiratory: Negative  Cardiovascular: Negative  Gastrointestinal: Negative  Past Medical History:     Past Medical History:   Diagnosis Date    [facility]     Seisure free since age 25     [facility]     Factor 5 Leiden mutation, heterozygous [facility]     Fracture, patella     Pregnancy     Resulted in 1 Living child       Past Surgical History:     Past Surgical History:   Procedure Laterality Date    BACK SURGERY      NASAL SEPTUM SURGERY      Deviation Repair     RHINOPLASTY        Social History:        Social History     Socioeconomic History    Marital status:      Spouse name: None    Number of children: None    Years of education: None    Highest education level: None   Occupational History    None   Social Needs    Financial resource strain: None    Food insecurity:     Worry: None     Inability: None    Transportation needs:     Medical: None     Non-medical: None   Tobacco Use    Smoking status: Never Smoker    Smokeless tobacco: Never Used   Substance and Sexual Activity    Alcohol use:  Yes     Alcohol/week: 2 0 standard drinks     Types: 2 Shots of liquor per week     Frequency: 4 or more times a week     Drinks per session: 1 or 2     Binge frequency: Never    Drug use: No    Sexual activity: Not Currently     Partners: Male     Birth control/protection: Injection     Comment:    Lifestyle    Physical activity:     Days per week: None     Minutes per session: None    Stress: None   Relationships    Social connections:     Talks on phone: None     Gets together: None     Attends Taoism service: None     Active member of club or organization: None     Attends meetings of clubs or organizations: None     Relationship status: None    Intimate partner violence:     Fear of current or ex partner: None     Emotionally abused: None     Physically abused: None     Forced sexual activity: None   Other Topics Concern    None   Social History Narrative    None      Family History:     Family History   Problem Relation Age of Onset    No Known Problems Mother     Heart attack Father     Colon cancer Father     Hypertension Father     Heart disease Father     Breast cancer Paternal Grandmother     Colon cancer Paternal Grandmother     Stomach cancer Paternal Grandfather     Breast cancer Maternal Aunt     No Known Problems Brother     No Known Problems Brother     No Known Problems Brother     Ovarian cancer Neg Hx     Uterine cancer Neg Hx     Cervical cancer Neg Hx       Current Medications:     Current Outpatient Medications   Medication Sig Dispense Refill    albuterol (PROAIR HFA) 90 mcg/act inhaler Inhale 2 puffs every 4 (four) hours as needed      albuterol (PROAIR HFA) 90 mcg/act inhaler Inhale 2 puffs every 4 (four) hours as needed for wheezing 1 Inhaler 3    calcium carbonate (OS-MELISSA) 1250 (500 Ca) MG tablet Take 1 tablet (1,250 mg total) by mouth daily 150 tablet 3    Cholecalciferol (VITAMIN D PO) Take by mouth      meclizine (ANTIVERT) 12 5 MG tablet Take 1 tablet (12 5 mg total) by mouth every 8 (eight) hours as needed for dizziness 30 tablet 0    medroxyPROGESTERone acetate (DEPO-PROVERA SYRINGE) 150 mg/mL injection Inject 1 mL (150 mg total) into a muscle every 3 (three) months 1 mL 4    naproxen (NAPROSYN) 500 mg tablet Take 1 tablet (500 mg total) by mouth 2 (two) times a day with meals 30 tablet 3    psyllium (METAMUCIL) 0 52 g capsule Take 0 52 g by mouth daily      Red Yeast Rice 600 MG TABS Take by mouth      Spacer/Aero Chamber Mouthpiece (SPACER DEVICE) for metered dose inhaler For use with metered dose inhaler  [facility] 1 Device 0     No current facility-administered medications for this visit  Allergies:      Allergies   Allergen Reactions    Carbamazepine Hives    Cephalexin Hives    Flurbiprofen Fatigue    Phenytoin Hives      Physical Exam:     /84 (BP Location: Left arm, Patient Position: Sitting, Cuff Size: Large)   Pulse 98   Temp 98 6 °F (37 °C) (Temporal)   Resp 16   Ht 4' 10" (1 473 m)   Wt 98 kg (216 lb)   SpO2 98%   BMI 45 14 kg/m²     Physical Exam   Constitutional: She appears well-developed and well-nourished  No distress  HENT:   Head: Normocephalic and atraumatic  Right Ear: External ear normal    Left Ear: External ear normal    Mouth/Throat: Oropharynx is clear and moist  No oropharyngeal exudate  Neck: Normal range of motion  Neck supple  No thyromegaly present  Cardiovascular: Normal rate, regular rhythm, normal heart sounds and intact distal pulses  Exam reveals no gallop and no friction rub  No murmur heard  Pulmonary/Chest: Effort normal and breath sounds normal  No stridor  No respiratory distress  She has no wheezes  She has no rales  She exhibits no tenderness  Abdominal: Soft  Bowel sounds are normal  She exhibits no distension and no mass  There is no tenderness  There is no guarding  Lymphadenopathy:     She has no cervical adenopathy  Skin: She is not diaphoretic         DO Rodger Villa

## 2020-05-29 DIAGNOSIS — Z30.42 ENCOUNTER FOR DEPO-PROVERA CONTRACEPTION: ICD-10-CM

## 2020-06-01 RX ORDER — MEDROXYPROGESTERONE ACETATE 150 MG/ML
150 INJECTION, SUSPENSION INTRAMUSCULAR
Qty: 1 ML | Refills: 4 | Status: SHIPPED | OUTPATIENT
Start: 2020-06-01 | End: 2021-05-14 | Stop reason: ALTCHOICE

## 2020-06-18 ENCOUNTER — TELEPHONE (OUTPATIENT)
Dept: OBGYN CLINIC | Facility: CLINIC | Age: 45
End: 2020-06-18

## 2020-12-11 ENCOUNTER — TELEMEDICINE (OUTPATIENT)
Dept: FAMILY MEDICINE CLINIC | Facility: CLINIC | Age: 45
End: 2020-12-11
Payer: COMMERCIAL

## 2020-12-11 DIAGNOSIS — Z20.822 ENCOUNTER FOR SCREENING LABORATORY TESTING FOR COVID-19 VIRUS: Primary | ICD-10-CM

## 2020-12-11 DIAGNOSIS — Z20.822 EXPOSURE TO COVID-19 VIRUS: ICD-10-CM

## 2020-12-11 DIAGNOSIS — M19.90 ARTHRITIS: ICD-10-CM

## 2020-12-11 DIAGNOSIS — B34.9 VIRAL INFECTION, UNSPECIFIED: ICD-10-CM

## 2020-12-11 PROCEDURE — 1036F TOBACCO NON-USER: CPT | Performed by: PHYSICIAN ASSISTANT

## 2020-12-11 PROCEDURE — U0003 INFECTIOUS AGENT DETECTION BY NUCLEIC ACID (DNA OR RNA); SEVERE ACUTE RESPIRATORY SYNDROME CORONAVIRUS 2 (SARS-COV-2) (CORONAVIRUS DISEASE [COVID-19]), AMPLIFIED PROBE TECHNIQUE, MAKING USE OF HIGH THROUGHPUT TECHNOLOGIES AS DESCRIBED BY CMS-2020-01-R: HCPCS | Performed by: PHYSICIAN ASSISTANT

## 2020-12-11 PROCEDURE — 99213 OFFICE O/P EST LOW 20 MIN: CPT | Performed by: PHYSICIAN ASSISTANT

## 2020-12-12 LAB — SARS-COV-2 RNA SPEC QL NAA+PROBE: DETECTED

## 2020-12-13 ENCOUNTER — TELEPHONE (OUTPATIENT)
Dept: FAMILY MEDICINE CLINIC | Facility: CLINIC | Age: 45
End: 2020-12-13

## 2020-12-13 PROBLEM — U07.1 COVID-19 VIRUS INFECTION: Status: ACTIVE | Noted: 2020-12-13

## 2020-12-15 RX ORDER — NAPROXEN 500 MG/1
500 TABLET ORAL 2 TIMES DAILY WITH MEALS
Qty: 30 TABLET | Refills: 0 | Status: SHIPPED | OUTPATIENT
Start: 2020-12-15 | End: 2021-01-17

## 2020-12-16 ENCOUNTER — TELEMEDICINE (OUTPATIENT)
Dept: FAMILY MEDICINE CLINIC | Facility: CLINIC | Age: 45
End: 2020-12-16
Payer: COMMERCIAL

## 2020-12-16 DIAGNOSIS — L50.9 URTICARIA: Primary | ICD-10-CM

## 2020-12-16 PROCEDURE — 99213 OFFICE O/P EST LOW 20 MIN: CPT | Performed by: INTERNAL MEDICINE

## 2021-01-16 DIAGNOSIS — M19.90 ARTHRITIS: ICD-10-CM

## 2021-01-17 RX ORDER — NAPROXEN 500 MG/1
TABLET ORAL
Qty: 30 TABLET | Refills: 3 | Status: SHIPPED | OUTPATIENT
Start: 2021-01-17 | End: 2021-02-01

## 2021-01-30 DIAGNOSIS — M19.90 ARTHRITIS: ICD-10-CM

## 2021-02-01 RX ORDER — NAPROXEN 500 MG/1
TABLET ORAL
Qty: 30 TABLET | Refills: 3 | Status: SHIPPED | OUTPATIENT
Start: 2021-02-01 | End: 2021-02-18

## 2021-02-18 DIAGNOSIS — M19.90 ARTHRITIS: ICD-10-CM

## 2021-02-18 RX ORDER — NAPROXEN 500 MG/1
TABLET ORAL
Qty: 30 TABLET | Refills: 3 | Status: SHIPPED | OUTPATIENT
Start: 2021-02-18 | End: 2021-03-01

## 2021-02-28 DIAGNOSIS — M19.90 ARTHRITIS: ICD-10-CM

## 2021-03-01 RX ORDER — NAPROXEN 500 MG/1
TABLET ORAL
Qty: 30 TABLET | Refills: 3 | Status: SHIPPED | OUTPATIENT
Start: 2021-03-01 | End: 2021-03-18

## 2021-03-18 DIAGNOSIS — M19.90 ARTHRITIS: ICD-10-CM

## 2021-03-22 RX ORDER — NAPROXEN 500 MG/1
TABLET ORAL
Qty: 60 TABLET | Refills: 0 | Status: SHIPPED | OUTPATIENT
Start: 2021-03-22 | End: 2021-05-10 | Stop reason: SDUPTHER

## 2021-04-12 DIAGNOSIS — Z23 ENCOUNTER FOR IMMUNIZATION: ICD-10-CM

## 2021-04-25 ENCOUNTER — IMMUNIZATIONS (OUTPATIENT)
Dept: FAMILY MEDICINE CLINIC | Facility: HOSPITAL | Age: 46
End: 2021-04-25

## 2021-04-25 DIAGNOSIS — Z23 ENCOUNTER FOR IMMUNIZATION: Primary | ICD-10-CM

## 2021-04-25 PROCEDURE — 0001A SARS-COV-2 / COVID-19 MRNA VACCINE (PFIZER-BIONTECH) 30 MCG: CPT

## 2021-04-25 PROCEDURE — 91300 SARS-COV-2 / COVID-19 MRNA VACCINE (PFIZER-BIONTECH) 30 MCG: CPT

## 2021-05-10 ENCOUNTER — OFFICE VISIT (OUTPATIENT)
Dept: FAMILY MEDICINE CLINIC | Facility: CLINIC | Age: 46
End: 2021-05-10
Payer: COMMERCIAL

## 2021-05-10 VITALS
SYSTOLIC BLOOD PRESSURE: 124 MMHG | HEIGHT: 60 IN | DIASTOLIC BLOOD PRESSURE: 86 MMHG | WEIGHT: 210 LBS | TEMPERATURE: 97.2 F | HEART RATE: 89 BPM | RESPIRATION RATE: 16 BRPM | OXYGEN SATURATION: 98 % | BODY MASS INDEX: 41.23 KG/M2

## 2021-05-10 DIAGNOSIS — J32.9 SINUSITIS, UNSPECIFIED CHRONICITY, UNSPECIFIED LOCATION: Primary | ICD-10-CM

## 2021-05-10 DIAGNOSIS — M19.90 ARTHRITIS: ICD-10-CM

## 2021-05-10 DIAGNOSIS — J45.909 ASTHMA, UNSPECIFIED ASTHMA SEVERITY, UNSPECIFIED WHETHER COMPLICATED, UNSPECIFIED WHETHER PERSISTENT: ICD-10-CM

## 2021-05-10 PROCEDURE — 99213 OFFICE O/P EST LOW 20 MIN: CPT | Performed by: INTERNAL MEDICINE

## 2021-05-10 PROCEDURE — 3725F SCREEN DEPRESSION PERFORMED: CPT | Performed by: INTERNAL MEDICINE

## 2021-05-10 RX ORDER — AMOXICILLIN 500 MG/1
500 CAPSULE ORAL EVERY 8 HOURS SCHEDULED
Qty: 30 CAPSULE | Refills: 0 | Status: SHIPPED | OUTPATIENT
Start: 2021-05-10 | End: 2021-05-20

## 2021-05-10 RX ORDER — NAPROXEN 500 MG/1
500 TABLET ORAL 2 TIMES DAILY WITH MEALS
Qty: 60 TABLET | Refills: 3 | Status: SHIPPED | OUTPATIENT
Start: 2021-05-10 | End: 2022-03-08 | Stop reason: SDUPTHER

## 2021-05-10 RX ORDER — ALBUTEROL SULFATE 90 UG/1
2 AEROSOL, METERED RESPIRATORY (INHALATION) EVERY 4 HOURS PRN
Qty: 18 G | Refills: 3 | Status: SHIPPED | OUTPATIENT
Start: 2021-05-10

## 2021-05-10 NOTE — PROGRESS NOTES
BMI Counseling: Body mass index is 41 71 kg/m²  The BMI is above normal  Nutrition recommendations include decreasing portion sizes and limiting drinks that contain sugar  Exercise recommendations include exercising 3-5 times per week  No pharmacotherapy was ordered  Patient referred to PCP due to patient being overweight  Assessment/Plan:         Diagnoses and all orders for this visit:    Sinusitis, unspecified chronicity, unspecified location  Comments:  amox  Orders:  -     amoxicillin (AMOXIL) 500 mg capsule; Take 1 capsule (500 mg total) by mouth every 8 (eight) hours for 10 days She can take amox    Arthritis  Comments:  prn naprosyn/ pepcid  Orders:  -     naproxen (NAPROSYN) 500 mg tablet; Take 1 tablet (500 mg total) by mouth 2 (two) times a day with meals    Asthma, unspecified asthma severity, unspecified whether complicated, unspecified whether persistent  -     albuterol (ProAir HFA) 90 mcg/act inhaler; Inhale 2 puffs every 4 (four) hours as needed for wheezing or shortness of breath          Subjective:      Patient ID: Yasmany Fry is a 55 y o  female  Pt is complaining of rt ear ache  Denies f/s/c  She is upset her  Mom just got diag with metastatic cancer  The following portions of the patient's history were reviewed and updated as appropriate: She  has a past medical history of Complex partial epilepsy ([facility] ), Epilepsy ([facility] ), Factor 5 Leiden mutation, heterozygous ([facility] ), Fracture, patella, and Pregnancy    She   Patient Active Problem List    Diagnosis Date Noted    COVID-19 virus infection 12/13/2020    Encounter for gynecological examination without abnormal finding 01/08/2019    Encounter for surveillance of injectable contraceptive 01/08/2019    HTN (hypertension) 09/02/2015    Hyperglycemia 07/24/2015    Asthma 12/26/2013    Dyslipidemia 09/13/2013    Morbid obesity ([address] ) 05/03/2013    Classic migraine with aura 04/18/2013    Factor V Leiden mutation ([facility] ) 04/18/2013     She  has a past surgical history that includes Back surgery; Nasal septum surgery; and Rhinoplasty  Her family history includes Breast cancer in her maternal aunt and paternal grandmother; Colon cancer in her father and paternal grandmother; Heart attack in her father; Heart disease in her father; Hypertension in her father; No Known Problems in her brother, brother, brother, and mother; Stomach cancer in her paternal grandfather  She  reports that she has never smoked  She has never used smokeless tobacco  She reports current alcohol use of about 2 0 standard drinks of alcohol per week  She reports that she does not use drugs  Current Outpatient Medications   Medication Sig Dispense Refill    albuterol (ProAir HFA) 90 mcg/act inhaler Inhale 2 puffs every 4 (four) hours as needed for wheezing or shortness of breath 18 g 3    calcium carbonate (OS-MELISSA) 1250 (500 Ca) MG tablet Take 1 tablet (1,250 mg total) by mouth daily 150 tablet 3    Cholecalciferol (VITAMIN D PO) Take by mouth      meclizine (ANTIVERT) 12 5 MG tablet Take 1 tablet (12 5 mg total) by mouth every 8 (eight) hours as needed for dizziness 30 tablet 0    naproxen (NAPROSYN) 500 mg tablet Take 1 tablet (500 mg total) by mouth 2 (two) times a day with meals 60 tablet 3    psyllium (METAMUCIL) 0 52 g capsule Take 0 52 g by mouth daily      Red Yeast Rice 600 MG TABS Take by mouth      Spacer/Aero Chamber Mouthpiece (SPACER DEVICE) for metered dose inhaler For use with metered dose inhaler   [employer] VHC 1 Device 0    albuterol (PROAIR HFA) 90 mcg/act inhaler Inhale 2 puffs every 4 (four) hours as needed for wheezing 1 Inhaler 3    amoxicillin (AMOXIL) 500 mg capsule Take 1 capsule (500 mg total) by mouth every 8 (eight) hours for 10 days She can take amox 30 capsule 0    medroxyPROGESTERone acetate (DEPO-PROVERA SYRINGE) 150 mg/mL injection Inject 1 mL (150 mg total) into a muscle every 3 (three) months (Patient not taking: Reported on 5/10/2021) 1 mL 4     No current facility-administered medications for this visit  Current Outpatient Medications on File Prior to Visit   Medication Sig    calcium carbonate (OS-MELISSA) 1250 (500 Ca) MG tablet Take 1 tablet (1,250 mg total) by mouth daily    Cholecalciferol (VITAMIN D PO) Take by mouth    meclizine (ANTIVERT) 12 5 MG tablet Take 1 tablet (12 5 mg total) by mouth every 8 (eight) hours as needed for dizziness    psyllium (METAMUCIL) 0 52 g capsule Take 0 52 g by mouth daily    Red Yeast Rice 600 MG TABS Take by mouth    Spacer/Aero Chamber Mouthpiece (SPACER DEVICE) for metered dose inhaler For use with metered dose inhaler  Optichamber Mary VHC    albuterol (PROAIR HFA) 90 mcg/act inhaler Inhale 2 puffs every 4 (four) hours as needed for wheezing    medroxyPROGESTERone acetate (DEPO-PROVERA SYRINGE) 150 mg/mL injection Inject 1 mL (150 mg total) into a muscle every 3 (three) months (Patient not taking: Reported on 5/10/2021)     No current facility-administered medications on file prior to visit  She is allergic to carbamazepine; cephalexin; flurbiprofen; and phenytoin       Review of Systems   Constitutional: Negative  Negative for chills and fever  HENT: Positive for ear pain  Negative for sore throat and voice change  Respiratory: Negative  Negative for cough and shortness of breath  Cardiovascular: Negative  Objective:      /86 (BP Location: Right arm, Patient Position: Sitting, Cuff Size: Large)   Pulse 89   Temp (!) 97 2 °F (36 2 °C) (Temporal)   Resp 16   Ht 4' 11 5" (1 511 m)   Wt 95 3 kg (210 lb)   LMP 04/20/2021 (Exact Date)   SpO2 98%   BMI 41 71 kg/m²          Physical Exam  Constitutional:       Appearance: She is obese  HENT:      Head: Normocephalic and atraumatic        Right Ear: Tympanic membrane normal       Left Ear: Tympanic membrane normal       Nose: Nose normal       Mouth/Throat:      Pharynx: Posterior oropharyngeal erythema present  Neck:      Musculoskeletal: Neck supple  Cardiovascular:      Rate and Rhythm: Normal rate and regular rhythm  Pulmonary:      Effort: Pulmonary effort is normal       Breath sounds: Normal breath sounds  Lymphadenopathy:      Cervical: No cervical adenopathy  Neurological:      Mental Status: She is alert

## 2021-05-16 ENCOUNTER — IMMUNIZATIONS (OUTPATIENT)
Dept: FAMILY MEDICINE CLINIC | Facility: HOSPITAL | Age: 46
End: 2021-05-16

## 2021-05-16 DIAGNOSIS — Z23 ENCOUNTER FOR IMMUNIZATION: Primary | ICD-10-CM

## 2021-05-16 PROCEDURE — 91300 SARS-COV-2 / COVID-19 MRNA VACCINE (PFIZER-BIONTECH) 30 MCG: CPT

## 2021-05-16 PROCEDURE — 0002A SARS-COV-2 / COVID-19 MRNA VACCINE (PFIZER-BIONTECH) 30 MCG: CPT

## 2021-05-17 ENCOUNTER — ANNUAL EXAM (OUTPATIENT)
Dept: OBGYN CLINIC | Facility: MEDICAL CENTER | Age: 46
End: 2021-05-17
Payer: COMMERCIAL

## 2021-05-17 VITALS
DIASTOLIC BLOOD PRESSURE: 82 MMHG | BODY MASS INDEX: 42.58 KG/M2 | SYSTOLIC BLOOD PRESSURE: 144 MMHG | HEIGHT: 59 IN | WEIGHT: 211.2 LBS

## 2021-05-17 DIAGNOSIS — Z12.31 ENCOUNTER FOR SCREENING MAMMOGRAM FOR MALIGNANT NEOPLASM OF BREAST: ICD-10-CM

## 2021-05-17 DIAGNOSIS — Z01.419 ENCNTR FOR GYN EXAM (GENERAL) (ROUTINE) W/O ABN FINDINGS: Primary | ICD-10-CM

## 2021-05-17 DIAGNOSIS — Z12.11 COLON CANCER SCREENING: ICD-10-CM

## 2021-05-17 DIAGNOSIS — N63.10 LUMP OF RIGHT BREAST: ICD-10-CM

## 2021-05-17 PROBLEM — Z30.42 ENCOUNTER FOR SURVEILLANCE OF INJECTABLE CONTRACEPTIVE: Status: RESOLVED | Noted: 2019-01-08 | Resolved: 2021-05-17

## 2021-05-17 PROCEDURE — 1036F TOBACCO NON-USER: CPT | Performed by: PHYSICIAN ASSISTANT

## 2021-05-17 PROCEDURE — 3008F BODY MASS INDEX DOCD: CPT | Performed by: PHYSICIAN ASSISTANT

## 2021-05-17 PROCEDURE — 99396 PREV VISIT EST AGE 40-64: CPT | Performed by: PHYSICIAN ASSISTANT

## 2021-06-18 ENCOUNTER — TELEPHONE (OUTPATIENT)
Dept: GASTROENTEROLOGY | Facility: AMBULARY SURGERY CENTER | Age: 46
End: 2021-06-18

## 2021-06-18 NOTE — TELEPHONE ENCOUNTER
Spoke to pt   Pt rescheduled from 7/23/2021 at [facility] to 7/24/2021 at [facility] w/ dr Althea Jackman for screening colonoscopy

## 2021-06-25 ENCOUNTER — HOSPITAL ENCOUNTER (OUTPATIENT)
Dept: ULTRASOUND IMAGING | Facility: CLINIC | Age: 46
Discharge: HOME | End: 2021-06-25
Payer: COMMERCIAL

## 2021-06-25 ENCOUNTER — HOSPITAL ENCOUNTER (OUTPATIENT)
Dept: MAMMOGRAPHY | Facility: CLINIC | Age: 46
Discharge: HOME | End: 2021-06-25
Payer: COMMERCIAL

## 2021-06-25 VITALS — HEIGHT: 59 IN | WEIGHT: 211 LBS | BODY MASS INDEX: 42.54 KG/M2

## 2021-06-25 DIAGNOSIS — N63.10 LUMP OF RIGHT BREAST: ICD-10-CM

## 2021-06-25 PROCEDURE — G0279 TOMOSYNTHESIS, MAMMO: HCPCS

## 2021-06-25 PROCEDURE — 77066 DX MAMMO INCL CAD BI: CPT

## 2021-06-25 PROCEDURE — 76642 ULTRASOUND BREAST LIMITED: CPT

## 2021-07-23 ENCOUNTER — TELEPHONE (OUTPATIENT)
Dept: GASTROENTEROLOGY | Facility: HOSPITAL | Age: 46
End: 2021-07-23

## 2021-07-24 ENCOUNTER — HOSPITAL ENCOUNTER (OUTPATIENT)
Dept: GASTROENTEROLOGY | Facility: HOSPITAL | Age: 46
Setting detail: OUTPATIENT SURGERY
Discharge: HOME | End: 2021-07-24
Attending: INTERNAL MEDICINE
Payer: COMMERCIAL

## 2021-07-24 ENCOUNTER — ANESTHESIA EVENT (OUTPATIENT)
Dept: GASTROENTEROLOGY | Facility: HOSPITAL | Age: 46
End: 2021-07-24

## 2021-07-24 ENCOUNTER — ANESTHESIA (OUTPATIENT)
Dept: GASTROENTEROLOGY | Facility: HOSPITAL | Age: 46
End: 2021-07-24

## 2021-07-24 VITALS
RESPIRATION RATE: 18 BRPM | HEIGHT: 59 IN | SYSTOLIC BLOOD PRESSURE: 138 MMHG | TEMPERATURE: 96.8 F | DIASTOLIC BLOOD PRESSURE: 85 MMHG | BODY MASS INDEX: 41.89 KG/M2 | WEIGHT: 207.8 LBS | OXYGEN SATURATION: 96 % | HEART RATE: 92 BPM

## 2021-07-24 DIAGNOSIS — Z12.11 SPECIAL SCREENING FOR MALIGNANT NEOPLASMS, COLON: ICD-10-CM

## 2021-07-24 PROCEDURE — G0105 COLORECTAL SCRN; HI RISK IND: HCPCS | Performed by: INTERNAL MEDICINE

## 2021-07-24 RX ORDER — PROPOFOL 10 MG/ML
INJECTION, EMULSION INTRAVENOUS AS NEEDED
Status: DISCONTINUED | OUTPATIENT
Start: 2021-07-24 | End: 2021-07-24

## 2021-07-24 RX ORDER — SODIUM CHLORIDE, SODIUM LACTATE, POTASSIUM CHLORIDE, CALCIUM CHLORIDE 600; 310; 30; 20 MG/100ML; MG/100ML; MG/100ML; MG/100ML
INJECTION, SOLUTION INTRAVENOUS CONTINUOUS PRN
Status: DISCONTINUED | OUTPATIENT
Start: 2021-07-24 | End: 2021-07-24

## 2021-07-24 RX ORDER — GLYCOPYRROLATE 0.2 MG/ML
INJECTION INTRAMUSCULAR; INTRAVENOUS AS NEEDED
Status: DISCONTINUED | OUTPATIENT
Start: 2021-07-24 | End: 2021-07-24

## 2021-07-24 RX ADMIN — PROPOFOL 50 MG: 10 INJECTION, EMULSION INTRAVENOUS at 09:19

## 2021-07-24 RX ADMIN — PROPOFOL 100 MG: 10 INJECTION, EMULSION INTRAVENOUS at 09:18

## 2021-07-24 RX ADMIN — SODIUM CHLORIDE, SODIUM LACTATE, POTASSIUM CHLORIDE, AND CALCIUM CHLORIDE: .6; .31; .03; .02 INJECTION, SOLUTION INTRAVENOUS at 09:17

## 2021-07-24 RX ADMIN — PROPOFOL 30 MG: 10 INJECTION, EMULSION INTRAVENOUS at 09:22

## 2021-07-24 RX ADMIN — PROPOFOL 30 MG: 10 INJECTION, EMULSION INTRAVENOUS at 09:25

## 2021-07-24 RX ADMIN — PROPOFOL 50 MG: 10 INJECTION, EMULSION INTRAVENOUS at 09:27

## 2021-07-24 RX ADMIN — GLYCOPYRROLATE 0.1 MG: 0.2 INJECTION, SOLUTION INTRAMUSCULAR; INTRAVENOUS at 09:18

## 2021-07-24 NOTE — H&P
History and Physical - [facility]  Toribio Cedeno 55 y o  female MRN: [id number]        HPI:  19-year-old female was referred for colonoscopy  Patient's father had colon polyps and grandfather had colon cancer  Regular bowel movements      Historical Information   Past Medical History:   Diagnosis Date    Complex partial epilepsy ([facility] )     Seisure free since age 25     Epilepsy ([facility] )     [facility]     Fracture, patella     Pregnancy     Resulted in 1 Living child      Past Surgical History:   Procedure Laterality Date    BACK SURGERY      BREAST BIOPSY Left 02/14/2004    benign    NASAL SEPTUM SURGERY      Deviation Repair     RHINOPLASTY       Social History   Social History     Substance and Sexual Activity   Alcohol Use Yes    Alcohol/week: 2 0 standard drinks    Types: 2 Shots of liquor per week     Social History     Substance and Sexual Activity   Drug Use No     Social History     Tobacco Use   Smoking Status Never Smoker   Smokeless Tobacco Never Used     Family History   Problem Relation Age of Onset    Liver cancer Mother     Heart attack Father     Colon cancer Father     Hypertension Father     Heart disease Father     Breast cancer Paternal Grandmother     Colon cancer Paternal Grandmother     Stomach cancer Paternal Grandfather     Breast cancer Maternal Aunt     No Known Problems Brother     No Known Problems Brother     No Known Problems Brother     No Known Problems Maternal Grandmother     No Known Problems Maternal Grandfather     No Known Problems Paternal Aunt     Ovarian cancer Neg Hx     Uterine cancer Neg Hx     Cervical cancer Neg Hx        Meds/Allergies     (Not in a hospital admission)      Allergies   Allergen Reactions    Carbamazepine Hives    Cephalexin Hives    Flurbiprofen Fatigue    Phenytoin Hives       Objective     Blood pressure 135/93, pulse 84, temperature (!) 97 1 °F (36 2 °C), temperature source Temporal, resp  rate 20, height 4' 11" (1 499 m), weight 94 3 kg (207 lb 12 8 oz), SpO2 98 %, not currently breastfeeding      PHYSICAL EXAM:    Gen: NAD  CV: S1 & S2 normal, RRR  CHEST: Clear to auscultate  ABD: soft, NT/ND, good bowel sounds  EXT: no edema    ASSESSMENT:     Family history of colon polyps and colon cancer    PLAN:    Colonoscopy

## 2021-07-24 NOTE — ANESTHESIA PREPROCEDURE EVALUATION
Medical History  History Comments   Epilepsy ([facility] )    [facility] Seisure free since age 25    Fracture, patella    Pregnancy Resulted in 1 Living child    Factor 5 Leiden mutation, heterozygous ([facility] )      Procedure:  COLONOSCOPY    Relevant Problems   CARDIO   (+) HTN (hypertension)      PULMONARY   (+) Asthma        Physical Exam    Airway    Mallampati score: III  TM Distance: >3 FB  Neck ROM: full     Dental   No notable dental hx     Cardiovascular  Rate: normal,     Pulmonary  Pulmonary exam normal     Other Findings        Anesthesia Plan  ASA Score- 3     Anesthesia Type- IV sedation with anesthesia with ASA Monitors  Additional Monitors:   Airway Plan:     Comment: Per patient, appropriately NPO, denies active CP/SOB/wheezing/symptoms related to heartburn/nausea/vomiting  Plan Factors-Exercise tolerance (METS): >4 METS  Chart reviewed  Patient summary reviewed  Patient is not a current smoker  Induction- intravenous  Postoperative Plan-     Informed Consent- Anesthetic plan and risks discussed with patient

## 2021-07-24 NOTE — ANESTHESIA POSTPROCEDURE EVALUATION
Post-Op Assessment Note    CV Status:  Stable    Pain management: satisfactory to patient     Mental Status:  Alert, awake and sleepy   Hydration Status:  Euvolemic   PONV Controlled:  Controlled   Airway Patency:  Patent      Post Op Vitals Reviewed: Yes      Staff: Anesthesiologist         No complications documented      BP   122/71   Temp  98   Pulse  105   Resp   12   SpO2   98

## 2021-09-09 PROBLEM — U07.1 COVID-19 VIRUS INFECTION: Status: RESOLVED | Noted: 2020-12-13 | Resolved: 2021-09-09

## 2021-09-10 ENCOUNTER — OFFICE VISIT (OUTPATIENT)
Dept: FAMILY MEDICINE CLINIC | Facility: CLINIC | Age: 46
End: 2021-09-10
Payer: COMMERCIAL

## 2021-09-10 VITALS
WEIGHT: 213.4 LBS | TEMPERATURE: 97.3 F | DIASTOLIC BLOOD PRESSURE: 84 MMHG | OXYGEN SATURATION: 98 % | SYSTOLIC BLOOD PRESSURE: 132 MMHG | BODY MASS INDEX: 43.02 KG/M2 | HEIGHT: 59 IN | HEART RATE: 86 BPM

## 2021-09-10 DIAGNOSIS — K64.9 HEMORRHOIDS, UNSPECIFIED HEMORRHOID TYPE: Primary | ICD-10-CM

## 2021-09-10 PROCEDURE — 99213 OFFICE O/P EST LOW 20 MIN: CPT | Performed by: PHYSICIAN ASSISTANT

## 2021-09-10 PROCEDURE — 3008F BODY MASS INDEX DOCD: CPT | Performed by: PHYSICIAN ASSISTANT

## 2021-09-10 RX ORDER — HYDROCORTISONE 25 MG/G
CREAM TOPICAL 2 TIMES DAILY
Qty: 28 G | Refills: 5 | Status: SHIPPED | OUTPATIENT
Start: 2021-09-10 | End: 2022-05-12

## 2021-09-10 NOTE — PROGRESS NOTES
Assessment/Plan:     Diagnoses and all orders for this visit:    Hemorrhoids, unspecified hemorrhoid type  Comments:  Referred to:  Rectal for eval and possible surgical removal  Orders:  -     Ambulatory referral to Colorectal Surgery; Future  -     hydrocortisone (ANUSOL-HC) 2 5 % rectal cream; Apply topically 2 (two) times a day          Subjective:      Patient ID: Sara Skaggs is a 55 y o  female  Presents in the office for recurrent hemorrhoid  Patient states she has had hemorrhoids ever since her son was born 23 years ago  Patient states she cannot see the hemorrhoid but she certainly can feel it protruding at times and she has intermittent bleeding  Patient just had a colonoscopy  She will need to see:  Rectal surgeon instead of Gastroenterology  The following portions of the patient's history were reviewed and updated as appropriate:   She   Patient Active Problem List    Diagnosis Date Noted    HTN (hypertension) 09/02/2015    Hyperglycemia 07/24/2015    Asthma 12/26/2013    Dyslipidemia 09/13/2013    Morbid obesity ([facility] ) 05/03/2013    Classic migraine with aura 04/18/2013    Factor V Leiden mutation ([facility] ) 04/18/2013     She  reports that she has never smoked  She has never used smokeless tobacco  She reports current alcohol use of about 2 0 standard drinks of alcohol per week  She reports that she does not use drugs  She is allergic to carbamazepine, cephalexin, flurbiprofen, and phenytoin       Review of Systems   Respiratory: Negative for shortness of breath  Cardiovascular: Negative for chest pain  Gastrointestinal:        Hemorrhoids           Objective:        Physical Exam  Vitals and nursing note reviewed  Constitutional:       Appearance: Normal appearance  She is obese  She is not ill-appearing  HENT:      Head: Normocephalic and atraumatic        Right Ear: External ear normal       Left Ear: External ear normal    Cardiovascular:      Rate and Rhythm: Normal rate and regular rhythm  Heart sounds: No murmur heard  Pulmonary:      Effort: Pulmonary effort is normal       Breath sounds: Normal breath sounds  Musculoskeletal:      Right lower leg: No edema  Left lower leg: No edema  Skin:     General: Skin is warm and dry  Neurological:      General: No focal deficit present  Mental Status: She is alert and oriented to person, place, and time  Psychiatric:         Mood and Affect: Mood normal          Behavior: Behavior normal          Thought Content:  Thought content normal          Judgment: Judgment normal

## 2021-10-21 PROBLEM — K62.9 PERIANAL LESION: Status: ACTIVE | Noted: 2021-10-21

## 2021-10-21 PROCEDURE — 88304 TISSUE EXAM BY PATHOLOGIST: CPT | Performed by: PATHOLOGY

## 2022-03-08 DIAGNOSIS — M19.90 ARTHRITIS: ICD-10-CM

## 2022-03-08 RX ORDER — NAPROXEN 500 MG/1
500 TABLET ORAL 2 TIMES DAILY WITH MEALS
Qty: 60 TABLET | Refills: 0 | Status: SHIPPED | OUTPATIENT
Start: 2022-03-08 | End: 2022-04-18

## 2022-04-17 DIAGNOSIS — M19.90 ARTHRITIS: ICD-10-CM

## 2022-04-18 RX ORDER — NAPROXEN 500 MG/1
TABLET ORAL
Qty: 60 TABLET | Refills: 0 | Status: SHIPPED | OUTPATIENT
Start: 2022-04-18 | End: 2022-05-12 | Stop reason: SDUPTHER

## 2022-05-12 ENCOUNTER — OFFICE VISIT (OUTPATIENT)
Dept: FAMILY MEDICINE CLINIC | Facility: CLINIC | Age: 47
End: 2022-05-12
Payer: COMMERCIAL

## 2022-05-12 VITALS
WEIGHT: 218 LBS | HEIGHT: 59 IN | TEMPERATURE: 97.5 F | SYSTOLIC BLOOD PRESSURE: 118 MMHG | HEART RATE: 95 BPM | BODY MASS INDEX: 43.95 KG/M2 | OXYGEN SATURATION: 98 % | DIASTOLIC BLOOD PRESSURE: 76 MMHG | RESPIRATION RATE: 16 BRPM

## 2022-05-12 DIAGNOSIS — E55.9 VITAMIN D DEFICIENCY: ICD-10-CM

## 2022-05-12 DIAGNOSIS — M19.90 ARTHRITIS: ICD-10-CM

## 2022-05-12 DIAGNOSIS — D68.51 FACTOR V LEIDEN MUTATION (HCC): ICD-10-CM

## 2022-05-12 DIAGNOSIS — R73.03 PREDIABETES: ICD-10-CM

## 2022-05-12 DIAGNOSIS — Z12.31 ENCOUNTER FOR SCREENING MAMMOGRAM FOR MALIGNANT NEOPLASM OF BREAST: ICD-10-CM

## 2022-05-12 DIAGNOSIS — I10 HYPERTENSION, UNSPECIFIED TYPE: Primary | ICD-10-CM

## 2022-05-12 DIAGNOSIS — E66.01 MORBID OBESITY (HCC): ICD-10-CM

## 2022-05-12 DIAGNOSIS — E78.5 DYSLIPIDEMIA: ICD-10-CM

## 2022-05-12 PROCEDURE — 3725F SCREEN DEPRESSION PERFORMED: CPT | Performed by: INTERNAL MEDICINE

## 2022-05-12 PROCEDURE — 99214 OFFICE O/P EST MOD 30 MIN: CPT | Performed by: INTERNAL MEDICINE

## 2022-05-12 RX ORDER — NAPROXEN 500 MG/1
500 TABLET ORAL 2 TIMES DAILY WITH MEALS
Qty: 60 TABLET | Refills: 0 | Status: SHIPPED | OUTPATIENT
Start: 2022-05-12 | End: 2022-06-29

## 2022-05-12 NOTE — PROGRESS NOTES
BMI Counseling: Body mass index is 44 03 kg/m²  The BMI is above normal  Nutrition recommendations include decreasing portion sizes and limiting drinks that contain sugar  Exercise recommendations include exercising 3-5 times per week  No pharmacotherapy was ordered  Patient referred to PCP  Rationale for BMI follow-up plan is due to patient being overweight or obese  Depression Screening and Follow-up Plan: Patient was screened for depression during today's encounter  They screened negative with a PHQ-2 score of 2  Assessment/Plan:         Diagnoses and all orders for this visit:    Hypertension, unspecified type  Comments:  stable  Orders:  -     CBC; Future    Encounter for screening mammogram for malignant neoplasm of breast  -     Mammo screening bilateral w 3d & cad; Future    Arthritis  Comments:  prn naprosyn/ pepcid  Orders:  -     naproxen (NAPROSYN) 500 mg tablet; Take 1 tablet (500 mg total) by mouth in the morning and 1 tablet (500 mg total) in the evening  Take with meals  Morbid obesity ([facility] )  Comments:  rec reduce  Orders:  -     TSH, 3rd generation with Free T4 reflex; Future    Vitamin D deficiency  Comments:  on replacement  Orders:  -     Vitamin D 25 hydroxy; Future    Prediabetes  Comments:  a1c  Orders:  -     Comprehensive metabolic panel; Future  -     Hemoglobin A1C; Future    Factor V Leiden mutation ([facility] )  Comments:  denies clotts    Dyslipidemia  Comments:  red yeast rice  Orders:  -     Comprehensive metabolic panel; Future  -     Lipid panel; Future          Subjective:      Patient ID: Chely Vick is a 52 y o  female  Pt states her hand pain is worse  Uses naproxen denies swollen jts     Discussed use about 1 week to 10 days and then try off      The following portions of the patient's history were reviewed and updated as appropriate: She  has a past medical history of [facility] ), [facility] ), Factor 5 Leiden mutation, heterozygous ([facility] ), Fracture, patella, and Pregnancy  She   Patient Active Problem List    Diagnosis Date Noted    Perianal lesion 10/21/2021    HTN (hypertension) 09/02/2015    Hyperglycemia 07/24/2015    Asthma 12/26/2013    Dyslipidemia 09/13/2013    Morbid obesity ([place] [address] ) 05/03/2013    Classic migraine with aura 04/18/2013    Factor V Leiden mutation ([place] [address] ) 04/18/2013     She  has a past surgical history that includes Back surgery; Nasal septum surgery; Rhinoplasty; and Breast biopsy (Left, 02/14/2004)  Her family history includes Breast cancer in her maternal aunt and paternal grandmother; Colon cancer in her paternal grandmother; Heart attack in her father; Heart disease in her father; Hypertension in her father; Liver cancer in her mother; No Known Problems in her brother, brother, brother, maternal grandfather, maternal grandmother, and paternal aunt; Stomach cancer in her paternal grandfather  She  reports that she has never smoked  She has never used smokeless tobacco  She reports current alcohol use of about 2 0 standard drinks of alcohol per week  She reports that she does not use drugs  Current Outpatient Medications   Medication Sig Dispense Refill    albuterol (ProAir HFA) 90 mcg/act inhaler Inhale 2 puffs every 4 (four) hours as needed for wheezing or shortness of breath 18 g 3    calcium carbonate (OS-MELISSA) 1250 (500 Ca) MG tablet Take 1 tablet (1,250 mg total) by mouth daily 150 tablet 3    Cholecalciferol (VITAMIN D PO) Take by mouth      naproxen (NAPROSYN) 500 mg tablet Take 1 tablet (500 mg total) by mouth in the morning and 1 tablet (500 mg total) in the evening  Take with meals  60 tablet 0    psyllium (METAMUCIL) 0 52 g capsule Take 0 52 g by mouth in the morning   Red Yeast Rice 600 MG TABS Take by mouth      Spacer/Aero Chamber Mouthpiece (SPACER DEVICE) for metered dose inhaler For use with metered dose inhaler   [facility] 1 Device 0     No current facility-administered medications for this visit  Current Outpatient Medications on File Prior to Visit   Medication Sig    albuterol (ProAir HFA) 90 mcg/act inhaler Inhale 2 puffs every 4 (four) hours as needed for wheezing or shortness of breath    calcium carbonate (OS-MELISSA) 1250 (500 Ca) MG tablet Take 1 tablet (1,250 mg total) by mouth daily    Cholecalciferol (VITAMIN D PO) Take by mouth    psyllium (METAMUCIL) 0 52 g capsule Take 0 52 g by mouth in the morning   Red Yeast Rice 600 MG TABS Take by mouth    Spacer/Aero Chamber Mouthpiece (SPACER DEVICE) for metered dose inhaler For use with metered dose inhaler  [facility]     No current facility-administered medications on file prior to visit  She is allergic to carbamazepine, cephalexin, flurbiprofen, and phenytoin       Review of Systems   Constitutional: Negative for chills and fever  HENT: Negative  Respiratory: Negative  Cardiovascular: Negative  Musculoskeletal: Positive for arthralgias  Negative for joint swelling  Objective:      /76 (BP Location: Right arm, Patient Position: Sitting, Cuff Size: Large)   Pulse 95   Temp 97 5 °F (36 4 °C) (Temporal)   Resp 16   Ht 4' 11" (1 499 m)   Wt 98 9 kg (218 lb)   SpO2 98%   BMI 44 03 kg/m²          Physical Exam  Constitutional:       Appearance: Normal appearance  HENT:      Head: Normocephalic and atraumatic  Right Ear: Tympanic membrane, ear canal and external ear normal       Left Ear: Tympanic membrane, ear canal and external ear normal    Cardiovascular:      Rate and Rhythm: Normal rate and regular rhythm  Pulmonary:      Effort: Pulmonary effort is normal       Breath sounds: Normal breath sounds  Musculoskeletal:      Cervical back: Neck supple  Comments: Neg jt swelling   Lymphadenopathy:      Cervical: No cervical adenopathy  Neurological:      Mental Status: She is alert

## 2022-05-23 ENCOUNTER — ANNUAL EXAM (OUTPATIENT)
Dept: OBGYN CLINIC | Facility: MEDICAL CENTER | Age: 47
End: 2022-05-23
Payer: COMMERCIAL

## 2022-05-23 VITALS
WEIGHT: 213.6 LBS | HEIGHT: 59 IN | DIASTOLIC BLOOD PRESSURE: 84 MMHG | SYSTOLIC BLOOD PRESSURE: 124 MMHG | BODY MASS INDEX: 43.06 KG/M2

## 2022-05-23 DIAGNOSIS — Z01.419 ENCNTR FOR GYN EXAM (GENERAL) (ROUTINE) W/O ABN FINDINGS: Primary | ICD-10-CM

## 2022-05-23 PROCEDURE — 1036F TOBACCO NON-USER: CPT | Performed by: PHYSICIAN ASSISTANT

## 2022-05-23 PROCEDURE — 99396 PREV VISIT EST AGE 40-64: CPT | Performed by: PHYSICIAN ASSISTANT

## 2022-05-23 PROCEDURE — 3008F BODY MASS INDEX DOCD: CPT | Performed by: PHYSICIAN ASSISTANT

## 2022-05-23 NOTE — PROGRESS NOTES
Berta Phillipsacheclint  1975      CC:  Yearly exam    S:  52 y o  female here for yearly exam  Her cycles are regular, not heavy or crampy  Sexual activity: She is not sexually active with her  - they are both okay with this    Contraception: She uses nothing for contraception  Last Pap 19 neg/neg  Last Mammo 21 neg  Last Colonoscopy 21 - repeat 5 years    We reviewed [facility] guidelines for Pap testing today  Family hx of breast cancer: PGM, maternal aunt  Family hx of ovarian cancer: no  Family hx of colon cancer: father, PGM    Her mom  2 months ago from liver cancer (metastasis from unknown site)     Current Outpatient Medications:     albuterol (ProAir HFA) 90 mcg/act inhaler, Inhale 2 puffs every 4 (four) hours as needed for wheezing or shortness of breath, Disp: 18 g, Rfl: 3    calcium carbonate (OS-MELISSA) 1250 (500 Ca) MG tablet, Take 1 tablet (1,250 mg total) by mouth daily, Disp: 150 tablet, Rfl: 3    Cholecalciferol (VITAMIN D PO), Take by mouth, Disp: , Rfl:     naproxen (NAPROSYN) 500 mg tablet, Take 1 tablet (500 mg total) by mouth in the morning and 1 tablet (500 mg total) in the evening  Take with meals  , Disp: 60 tablet, Rfl: 0    psyllium (METAMUCIL) 0 52 g capsule, Take 0 52 g by mouth in the morning , Disp: , Rfl:     Red Yeast Rice 600 MG TABS, Take by mouth, Disp: , Rfl:     Spacer/Aero Chamber Mouthpiece (SPACER DEVICE) for metered dose inhaler, For use with metered dose inhaler   [facility], Disp: 1 Device, Rfl: 0  Social History     Socioeconomic History    Marital status: /Civil Union     Spouse name: Not on file    Number of children: Not on file    Years of education: Not on file    Highest education level: Not on file   Occupational History    Not on file   Tobacco Use    Smoking status: Never Smoker    Smokeless tobacco: Never Used   Vaping Use    Vaping Use: Never used   Substance and Sexual Activity    Alcohol use: Yes     Alcohol/week: 2 0 standard drinks     Types: 2 Shots of liquor per week     Comment: glass wine/day    Drug use: No    Sexual activity: Not Currently     Partners: Male     Comment:    Other Topics Concern    Not on file   Social History Narrative    Not on file     Social Determinants of Health     Financial Resource Strain: Not on file   Food Insecurity: Not on file   Transportation Needs: Not on file   Physical Activity: Not on file   Stress: Not on file   Social Connections: Not on file   Intimate Partner Violence: Not on file   Housing Stability: Not on file     Family History   Problem Relation Age of Onset    Liver cancer Mother     Heart attack Father     Hypertension Father     Heart disease Father     Breast cancer Paternal Grandmother     Colon cancer Paternal Grandmother     Stomach cancer Paternal Grandfather     Breast cancer Maternal Aunt     No Known Problems Brother     No Known Problems Brother     No Known Problems Brother     No Known Problems Maternal Grandmother     No Known Problems Maternal Grandfather     No Known Problems Paternal Aunt     Ovarian cancer Neg Hx     Uterine cancer Neg Hx     Cervical cancer Neg Hx       Past Medical History:   Diagnosis Date    Complex partial epilepsy ([facility] )     Seisure free since age 25     Epilepsy ([facility] )     [facility]     Fracture, patella     Pregnancy     Resulted in 1 Living child         Review of Systems   Respiratory: Negative  Cardiovascular: Negative  Gastrointestinal: Negative for constipation and diarrhea  Genitourinary: Negative for difficulty urinating, pelvic pain, vaginal bleeding, vaginal discharge, itching or odor  O:  Blood pressure 124/84, height 4' 11" (1 499 m), weight 96 9 kg (213 lb 9 6 oz), not currently breastfeeding      Patient appears well and is not in distress  Neck is supple without masses  Breasts are symmetrical without mass, tenderness, nipple discharge, skin changes or adenopathy  Abdomen is soft and nontender without masses  External genitals are normal without lesions or rashes  Urethral meatus and urethra are normal  Bladder is normal to palpation  Vagina is normal without discharge or bleeding  Cervix is normal without discharge or lesion  Uterus is normal, mobile, nontender without palpable mass  Adnexa are normal, nontender, without palpable mass  A:   Yearly exam      P:   Pap 2024   Mammo scheduled   Colonoscopy 2026    RTO one year for yearly exam or sooner as needed

## 2022-06-22 ENCOUNTER — APPOINTMENT (OUTPATIENT)
Dept: LAB | Age: 47
End: 2022-06-22
Payer: COMMERCIAL

## 2022-06-22 DIAGNOSIS — I10 HYPERTENSION, UNSPECIFIED TYPE: ICD-10-CM

## 2022-06-22 DIAGNOSIS — E78.5 DYSLIPIDEMIA: ICD-10-CM

## 2022-06-22 DIAGNOSIS — R73.03 PREDIABETES: ICD-10-CM

## 2022-06-22 DIAGNOSIS — E66.01 MORBID OBESITY (HCC): ICD-10-CM

## 2022-06-22 DIAGNOSIS — E55.9 VITAMIN D DEFICIENCY: ICD-10-CM

## 2022-06-22 LAB
25(OH)D3 SERPL-MCNC: 17 NG/ML (ref 30–100)
ALBUMIN SERPL BCP-MCNC: 3.8 G/DL (ref 3.5–5)
ALP SERPL-CCNC: 100 U/L (ref 46–116)
ALT SERPL W P-5'-P-CCNC: 24 U/L (ref 12–78)
ANION GAP SERPL CALCULATED.3IONS-SCNC: 4 MMOL/L (ref 4–13)
AST SERPL W P-5'-P-CCNC: 15 U/L (ref 5–45)
BILIRUB SERPL-MCNC: 0.3 MG/DL (ref 0.2–1)
BUN SERPL-MCNC: 20 MG/DL (ref 5–25)
CALCIUM SERPL-MCNC: 9.2 MG/DL (ref 8.3–10.1)
CHLORIDE SERPL-SCNC: 107 MMOL/L (ref 100–108)
CHOLEST SERPL-MCNC: 258 MG/DL
CO2 SERPL-SCNC: 29 MMOL/L (ref 21–32)
CREAT SERPL-MCNC: 0.76 MG/DL (ref 0.6–1.3)
ERYTHROCYTE [DISTWIDTH] IN BLOOD BY AUTOMATED COUNT: 12.9 % (ref 11.6–15.1)
GFR SERPL CREATININE-BSD FRML MDRD: 93 ML/MIN/1.73SQ M
GLUCOSE P FAST SERPL-MCNC: 93 MG/DL (ref 65–99)
HCT VFR BLD AUTO: 40.6 % (ref 34.8–46.1)
HDLC SERPL-MCNC: 42 MG/DL
HGB BLD-MCNC: 12.8 G/DL (ref 11.5–15.4)
LDLC SERPL CALC-MCNC: 185 MG/DL (ref 0–100)
MCH RBC QN AUTO: 29.9 PG (ref 26.8–34.3)
MCHC RBC AUTO-ENTMCNC: 31.5 G/DL (ref 31.4–37.4)
MCV RBC AUTO: 95 FL (ref 82–98)
NONHDLC SERPL-MCNC: 216 MG/DL
PLATELET # BLD AUTO: 389 THOUSANDS/UL (ref 149–390)
PMV BLD AUTO: 11.1 FL (ref 8.9–12.7)
POTASSIUM SERPL-SCNC: 3.8 MMOL/L (ref 3.5–5.3)
PROT SERPL-MCNC: 8 G/DL (ref 6.4–8.2)
RBC # BLD AUTO: 4.28 MILLION/UL (ref 3.81–5.12)
SODIUM SERPL-SCNC: 140 MMOL/L (ref 136–145)
TRIGL SERPL-MCNC: 153 MG/DL
TSH SERPL DL<=0.05 MIU/L-ACNC: 1.69 UIU/ML (ref 0.45–4.5)
WBC # BLD AUTO: 8.02 THOUSAND/UL (ref 4.31–10.16)

## 2022-06-22 PROCEDURE — 83036 HEMOGLOBIN GLYCOSYLATED A1C: CPT

## 2022-06-22 PROCEDURE — 36415 COLL VENOUS BLD VENIPUNCTURE: CPT

## 2022-06-22 PROCEDURE — 84443 ASSAY THYROID STIM HORMONE: CPT

## 2022-06-22 PROCEDURE — 82306 VITAMIN D 25 HYDROXY: CPT

## 2022-06-22 PROCEDURE — 85027 COMPLETE CBC AUTOMATED: CPT

## 2022-06-22 PROCEDURE — 80061 LIPID PANEL: CPT

## 2022-06-22 PROCEDURE — 80053 COMPREHEN METABOLIC PANEL: CPT

## 2022-06-23 LAB
EST. AVERAGE GLUCOSE BLD GHB EST-MCNC: 114 MG/DL
HBA1C MFR BLD: 5.6 %

## 2022-06-28 DIAGNOSIS — M19.90 ARTHRITIS: ICD-10-CM

## 2022-06-29 RX ORDER — NAPROXEN 500 MG/1
TABLET ORAL
Qty: 60 TABLET | Refills: 0 | Status: SHIPPED | OUTPATIENT
Start: 2022-06-29 | End: 2022-08-08

## 2022-06-30 ENCOUNTER — HOSPITAL ENCOUNTER (OUTPATIENT)
Dept: RADIOLOGY | Age: 47
Discharge: HOME | End: 2022-06-30
Payer: COMMERCIAL

## 2022-06-30 VITALS — HEIGHT: 59 IN | BODY MASS INDEX: 42.33 KG/M2 | WEIGHT: 210 LBS

## 2022-06-30 DIAGNOSIS — Z12.31 ENCOUNTER FOR SCREENING MAMMOGRAM FOR MALIGNANT NEOPLASM OF BREAST: ICD-10-CM

## 2022-06-30 PROCEDURE — 77067 SCR MAMMO BI INCL CAD: CPT

## 2022-06-30 PROCEDURE — 77063 BREAST TOMOSYNTHESIS BI: CPT

## 2022-08-12 ENCOUNTER — HOSPITAL ENCOUNTER (OUTPATIENT)
Dept: MAMMOGRAPHY | Facility: CLINIC | Age: 47
Discharge: HOME | End: 2022-08-12
Payer: COMMERCIAL

## 2022-08-12 ENCOUNTER — HOSPITAL ENCOUNTER (OUTPATIENT)
Dept: ULTRASOUND IMAGING | Facility: CLINIC | Age: 47
Discharge: HOME | End: 2022-08-12
Payer: COMMERCIAL

## 2022-08-12 VITALS — WEIGHT: 210 LBS | HEIGHT: 59 IN | BODY MASS INDEX: 42.33 KG/M2

## 2022-08-12 DIAGNOSIS — R92.8 ABNORMAL SCREENING MAMMOGRAM: ICD-10-CM

## 2022-08-12 PROCEDURE — 77066 DX MAMMO INCL CAD BI: CPT

## 2022-08-12 PROCEDURE — G0279 TOMOSYNTHESIS, MAMMO: HCPCS

## 2022-08-12 PROCEDURE — 76642 ULTRASOUND BREAST LIMITED: CPT

## 2022-10-17 ENCOUNTER — OFFICE VISIT (OUTPATIENT)
Dept: FAMILY MEDICINE CLINIC | Facility: CLINIC | Age: 47
End: 2022-10-17
Payer: COMMERCIAL

## 2022-10-17 VITALS
OXYGEN SATURATION: 98 % | DIASTOLIC BLOOD PRESSURE: 78 MMHG | TEMPERATURE: 97.4 F | HEART RATE: 71 BPM | RESPIRATION RATE: 16 BRPM | HEIGHT: 59 IN | BODY MASS INDEX: 42.74 KG/M2 | SYSTOLIC BLOOD PRESSURE: 118 MMHG | WEIGHT: 212 LBS

## 2022-10-17 DIAGNOSIS — I10 HYPERTENSION, UNSPECIFIED TYPE: Primary | ICD-10-CM

## 2022-10-17 DIAGNOSIS — G62.9 NEUROPATHY: ICD-10-CM

## 2022-10-17 DIAGNOSIS — D68.51 FACTOR V LEIDEN MUTATION (HCC): ICD-10-CM

## 2022-10-17 DIAGNOSIS — E78.5 DYSLIPIDEMIA: ICD-10-CM

## 2022-10-17 PROCEDURE — 99214 OFFICE O/P EST MOD 30 MIN: CPT | Performed by: INTERNAL MEDICINE

## 2022-10-17 NOTE — PROGRESS NOTES
Depression Screening and Follow-up Plan: Patient was screened for depression during today's encounter  They screened negative with a PHQ-2 score of 0  Assessment/Plan:         Diagnoses and all orders for this visit:    Hypertension, unspecified type    Factor V Leiden mutation ([facility] )    Dyslipidemia    Neuropathy  -     sertraline (ZOLOFT) 50 mg tablet; Take 1 tablet (50 mg total) by mouth daily          Subjective:      Patient ID: Javi Stern is a 52 y o  female  Pt does not need rx   +sore throat since weekend  +post nasal drip  Discussed will try zoloft for numbness in fingers and toes  She wants to tryfor the numbness      The following portions of the patient's history were reviewed and updated as appropriate: She  has a past medical history of Complex partial epilepsy ([facility] ), Epilepsy ([place] [address] ), Factor 5 Leiden mutation, heterozygous ([facility] ), Fracture, patella, and Pregnancy  She   Patient Active Problem List    Diagnosis Date Noted   • Perianal lesion 10/21/2021   • HTN (hypertension) 09/02/2015   • Hyperglycemia 07/24/2015   • Asthma 12/26/2013   • Dyslipidemia 09/13/2013   • Vitamin D deficiency 05/21/2013   • Morbid obesity ([facility] ) 05/03/2013   • Classic migraine with aura 04/18/2013   • Factor V Leiden mutation ([place] [address] ) 04/18/2013     She  has a past surgical history that includes Back surgery; Nasal septum surgery; Rhinoplasty; and Breast excisional biopsy (Left, 02/14/2004)  Her family history includes Breast cancer in her maternal aunt and paternal grandmother; Colon cancer in her paternal grandmother; Heart attack in her father; Heart disease in her father; Hypertension in her father; Liver cancer in her mother; No Known Problems in her brother, brother, brother, maternal aunt, maternal aunt, maternal grandfather, maternal grandmother, and paternal aunt; Stomach cancer in her paternal grandfather  She  reports that she has never smoked   She has never used smokeless tobacco  She reports current alcohol use of about 2 0 standard drinks of alcohol per week  She reports that she does not use drugs  Current Outpatient Medications   Medication Sig Dispense Refill   • albuterol (ProAir HFA) 90 mcg/act inhaler Inhale 2 puffs every 4 (four) hours as needed for wheezing or shortness of breath 18 g 3   • calcium carbonate (OS-MELISSA) 1250 (500 Ca) MG tablet Take 1 tablet (1,250 mg total) by mouth daily 150 tablet 3   • Cholecalciferol (VITAMIN D PO) Take by mouth     • naproxen (NAPROSYN) 500 mg tablet TAKE 1 TABLET BY MOUTH TWICE A DAY WITH MEALS 60 tablet 3   • psyllium (METAMUCIL) 0 52 g capsule Take 0 52 g by mouth in the morning  • Red Yeast Rice 600 MG TABS Take by mouth     • sertraline (ZOLOFT) 50 mg tablet Take 1 tablet (50 mg total) by mouth daily 30 tablet 1   • Spacer/Aero Chamber Mouthpiece (SPACER DEVICE) for metered dose inhaler For use with metered dose inhaler  [facility] 1 Device 0     No current facility-administered medications for this visit  Current Outpatient Medications on File Prior to Visit   Medication Sig   • albuterol (ProAir HFA) 90 mcg/act inhaler Inhale 2 puffs every 4 (four) hours as needed for wheezing or shortness of breath   • calcium carbonate (OS-MELISSA) 1250 (500 Ca) MG tablet Take 1 tablet (1,250 mg total) by mouth daily   • Cholecalciferol (VITAMIN D PO) Take by mouth   • naproxen (NAPROSYN) 500 mg tablet TAKE 1 TABLET BY MOUTH TWICE A DAY WITH MEALS   • psyllium (METAMUCIL) 0 52 g capsule Take 0 52 g by mouth in the morning  • Red Yeast Rice 600 MG TABS Take by mouth   • Spacer/Aero Chamber Mouthpiece (SPACER DEVICE) for metered dose inhaler For use with metered dose inhaler  [facility]     No current facility-administered medications on file prior to visit  She is allergic to carbamazepine, cephalexin, flurbiprofen, and phenytoin       Review of Systems   Constitutional: Negative      HENT: Positive for postnasal drip and sore throat  Respiratory: Negative  Cardiovascular: Negative  Neurological: Positive for numbness  Objective:      /78 (BP Location: Right arm, Patient Position: Sitting, Cuff Size: Large)   Pulse 71   Temp (!) 97 4 °F (36 3 °C) (Temporal)   Resp 16   Ht 4' 11" (1 499 m)   Wt 96 2 kg (212 lb)   LMP 10/11/2022 (Exact Date)   SpO2 98%   BMI 42 82 kg/m²          Physical Exam  Constitutional:       Appearance: She is obese  HENT:      Head: Normocephalic and atraumatic  Right Ear: Tympanic membrane and ear canal normal       Left Ear: Tympanic membrane and ear canal normal    Cardiovascular:      Rate and Rhythm: Normal rate and regular rhythm  Pulmonary:      Effort: Pulmonary effort is normal       Breath sounds: Normal breath sounds  Musculoskeletal:      Cervical back: Neck supple  Lymphadenopathy:      Cervical: No cervical adenopathy  Neurological:      Mental Status: She is alert

## 2022-11-08 DIAGNOSIS — G62.9 NEUROPATHY: ICD-10-CM

## 2023-02-08 ENCOUNTER — HOSPITAL ENCOUNTER (OUTPATIENT)
Dept: MAMMOGRAPHY | Facility: CLINIC | Age: 48
Discharge: HOME | End: 2023-02-08

## 2023-02-08 ENCOUNTER — HOSPITAL ENCOUNTER (OUTPATIENT)
Dept: ULTRASOUND IMAGING | Facility: CLINIC | Age: 48
Discharge: HOME | End: 2023-02-08

## 2023-02-08 VITALS — HEIGHT: 59 IN | WEIGHT: 212 LBS | BODY MASS INDEX: 42.74 KG/M2

## 2023-02-08 DIAGNOSIS — N60.02 CYST (SOLITARY) OF BREAST, LEFT: ICD-10-CM

## 2023-02-08 NOTE — PROGRESS NOTES
Met with patient and Dr Kimberly Eng              regarding recommendation for;      _____ RIGHT _x_____LEFT      __x___Ultrasound guided  ______Stereotactic  Breast biopsy  __x___Verbalized understanding  Blood thinners:  _____yes __x___no    Date stopped: _____x______    Biopsy teaching sheet given:  ____x___yes ______no    Lonnie Corona had benign left breast excisional biopsy in 2004  For further medical history, see Epic summary  Consent reviewed

## 2023-03-08 ENCOUNTER — HOSPITAL ENCOUNTER (OUTPATIENT)
Dept: MAMMOGRAPHY | Facility: CLINIC | Age: 48
Discharge: HOME | End: 2023-03-08

## 2023-03-08 ENCOUNTER — TELEPHONE (OUTPATIENT)
Dept: FAMILY MEDICINE CLINIC | Facility: CLINIC | Age: 48
End: 2023-03-08

## 2023-03-08 VITALS — SYSTOLIC BLOOD PRESSURE: 159 MMHG | HEART RATE: 81 BPM | DIASTOLIC BLOOD PRESSURE: 93 MMHG

## 2023-03-08 DIAGNOSIS — Z98.890 S/P BREAST BIOPSY: Primary | ICD-10-CM

## 2023-03-08 DIAGNOSIS — R92.8 ABNORMAL MAMMOGRAM: ICD-10-CM

## 2023-03-08 DIAGNOSIS — Z98.890 STATUS POST BREAST BIOPSY: ICD-10-CM

## 2023-03-08 RX ORDER — ACETAMINOPHEN AND CODEINE PHOSPHATE 60; 300 MG/1; MG/1
1 TABLET ORAL EVERY 6 HOURS PRN
Qty: 4 TABLET | Refills: 0 | Status: SHIPPED | OUTPATIENT
Start: 2023-03-08

## 2023-03-08 RX ORDER — LIDOCAINE HYDROCHLORIDE 10 MG/ML
5 INJECTION, SOLUTION EPIDURAL; INFILTRATION; INTRACAUDAL; PERINEURAL ONCE
Status: COMPLETED | OUTPATIENT
Start: 2023-03-08 | End: 2023-03-08

## 2023-03-08 RX ORDER — LIDOCAINE HYDROCHLORIDE AND EPINEPHRINE BITARTRATE 20; .01 MG/ML; MG/ML
10 INJECTION, SOLUTION SUBCUTANEOUS ONCE
Status: COMPLETED | OUTPATIENT
Start: 2023-03-08 | End: 2023-03-08

## 2023-03-08 RX ADMIN — LIDOCAINE HYDROCHLORIDE 5 ML: 10 INJECTION, SOLUTION EPIDURAL; INFILTRATION; INTRACAUDAL; PERINEURAL at 13:04

## 2023-03-08 RX ADMIN — LIDOCAINE HYDROCHLORIDE AND EPINEPHRINE 10 ML: 20; 10 INJECTION, SOLUTION INFILTRATION; PERINEURAL at 13:04

## 2023-03-08 NOTE — DISCHARGE INSTR - OTHER ORDERS
POST LARGE CORE BREAST BIOPSY PATIENT INFORMATION      Place an ice pack inside your bra over the top of the dressing every hour for 20 minutes (20 minutes on, 60 minutes off)  Do this until bedtime  Do not shower or bathe until the following morning  After bathing, you may remove the bandaid  You may bathe your breast carefully with the steri-strips in place  Be careful not to loosen them  The steri-strips will fall off in 3-5 days  You may have mild discomfort, and you may have some bruising where the needle entered the skin  This should clear within 5-7 days  If you need medicine for discomfort, take acetaminophen products such as Tylenol  You may also take Advil or Motrin products  DO NOT use Aspirin for the first 24 hours  Do not participate in strenuous activities such as-tennis, aerobics, weight lifting, etc  for 24 hours  Refrain from swimming/soaking for 72 hours  Wearing a bra for sleeping may be more comfortable for the first 24-48 hours after your biopsy  Watch for continued bleeding, pain or fever over 101  If any of these symptoms occur, please contact our breast nurse navigator at the location where your biopsy was performed  During normal business hours (7:30am - 4:00pm) please call the nurse navigator at the site     where your procedure was performed:       [address]: [phone] or      [address]: [phone] or [phone]     Rosanna Richards [address]: [address] [facility]: [address]: [phone]        After 4pm - please call your physician or go to the nearest Emergency Department location  The final results of your biopsy are usually available within one week

## 2023-03-08 NOTE — PROGRESS NOTES
Patient undergoing breast biopsy, will provide one-time of Tylenol with codeine for pain management post biopsy

## 2023-03-08 NOTE — PROGRESS NOTES
Patient arrived via:    __X__ambulatory    _____wheelchair    _____stretcher      Domestic violence screen    ___X___negative______positive    Breast Implants:    _______yes ___X_____no

## 2023-03-08 NOTE — TELEPHONE ENCOUNTER
Patient called in- she's having a breast biopsy in about an hour and she stated her breast is going to be compressed  The radiologist can't prescribe pain meds and patient is asking for PCP to prescribe a few pills (tylonal with codine?) to help her post biopsy      [facility] [address]

## 2023-03-09 NOTE — PROGRESS NOTES
Post procedure call completed on 3/9/23 at 1115    Bleeding: _____yes __X___no (pt denies)    Pain: __X___yes ______no (pt reports soreness at biopsy site, called PCP and has prescription for tylenol/codeine for pain management   Pt using ice pack as directed and reports some pain relief)    Redness/Swelling: ______yes ___X___no (pt denies)    Band aid removed: _____yes ___X__no (discussed removing when she showers)    Steri-Strips intact: ___X___yes _____no (discussed with patient to remove steri strips on Monday if they have not come off on their own)    Pt with no questions at this time, adv will call when results available, adv to call with any questions or concerns, has name/# for contact

## 2023-03-09 NOTE — PROGRESS NOTES
Patient's procedure Time Out happened at 1:02 pm     Procedure type:    _____ultrasound guided __X___stereotactic    Breast:    __X___Left _____Right    Location: 9:00 o'clock focal assym    Needle: 8 gauge     # of passes: 3 cores all submitted    Clip: Triple Twist    Performed by: Dr Elfego Mcneil held for 5 minutes by: Chanda Garcia Strips:    __X___yes _____no    Band aid:    __X___yes_____no    Tape and guaze:    ___yes __X___no    Tolerated procedure:    __X___yes _____no

## 2023-03-13 ENCOUNTER — TELEPHONE (OUTPATIENT)
Dept: MAMMOGRAPHY | Facility: CLINIC | Age: 48
End: 2023-03-13

## 2023-03-22 ENCOUNTER — OFFICE VISIT (OUTPATIENT)
Dept: FAMILY MEDICINE CLINIC | Facility: CLINIC | Age: 48
End: 2023-03-22

## 2023-03-22 VITALS
OXYGEN SATURATION: 98 % | DIASTOLIC BLOOD PRESSURE: 84 MMHG | HEART RATE: 90 BPM | HEIGHT: 59 IN | BODY MASS INDEX: 43.34 KG/M2 | TEMPERATURE: 97.5 F | SYSTOLIC BLOOD PRESSURE: 120 MMHG | WEIGHT: 215 LBS

## 2023-03-22 DIAGNOSIS — R07.81 RIB PAIN ON RIGHT SIDE: Primary | ICD-10-CM

## 2023-03-22 NOTE — PROGRESS NOTES
Assessment/Plan:     Diagnoses and all orders for this visit:    Rib pain on right side  Comments:  Continue over-the-counter analgesics  Chest x-ray ordered  Orders:  -     XR ribs right w pa chest min 3 views; Future          Subjective:      Patient ID: Xiang Francisco is a 52 y o  female  Patient presents in the office with pain in in the right side of her chest   Along the right breast and chest wall  Patient states it also feels a little bit swollen  No redness  Masses palpated  Of note patient just had a diagnostic mammogram with an ultrasound of her left breast   Status post left breast biopsy on March 8 with negative findings  Patient states she also had a little bit of clear drainage in her right eye  Exam shows no breast mass no axillary or clavicular nodes  Does have chest wall tenderness in the right axillary rib region  Tenderness in the left rib region  Was reassured  We obtain chest x-ray and rib x-ray  She may continue the counter pain meds        The following portions of the patient's history were reviewed and updated as appropriate:   She   Patient Active Problem List    Diagnosis Date Noted   • Perianal lesion 10/21/2021   • HTN (hypertension) 09/02/2015   • Hyperglycemia 07/24/2015   • Asthma 12/26/2013   • Dyslipidemia 09/13/2013   • Vitamin D deficiency 05/21/2013   • Morbid obesity ([place] [address] ) 05/03/2013   • Classic migraine with aura 04/18/2013   • Factor V Leiden mutation ([place] [address] ) 04/18/2013     Current Outpatient Medications   Medication Sig Dispense Refill   • albuterol (ProAir HFA) 90 mcg/act inhaler Inhale 2 puffs every 4 (four) hours as needed for wheezing or shortness of breath 18 g 3   • calcium carbonate (OS-MELISSA) 1250 (500 Ca) MG tablet Take 1 tablet (1,250 mg total) by mouth daily 150 tablet 3   • Cholecalciferol (VITAMIN D PO) Take by mouth     • naproxen (NAPROSYN) 500 mg tablet TAKE 1 TABLET BY MOUTH TWICE A DAY WITH MEALS 60 tablet 3   • psyllium (METAMUCIL) 0 52 g capsule Take 0 52 g by mouth in the morning  • Red Yeast Rice 600 MG TABS Take by mouth     • sertraline (ZOLOFT) 50 mg tablet TAKE 1 TABLET BY MOUTH EVERY DAY 90 tablet 1   • Spacer/Aero Chamber Mouthpiece (SPACER DEVICE) for metered dose inhaler For use with metered dose inhaler  [facility] 1 Device 0   • acetaminophen-codeine (TYLENOL #4) 300-60 MG per tablet Take 1 tablet by mouth every 6 (six) hours as needed for moderate pain (Patient not taking: Reported on 3/22/2023) 4 tablet 0     No current facility-administered medications for this visit  She is allergic to carbamazepine, cephalexin, flurbiprofen, and phenytoin       Review of Systems   Constitutional: Negative for activity change, appetite change, fatigue and fever  HENT: Negative for congestion, ear pain and sore throat  Eyes: Positive for discharge  Negative for redness and itching  Respiratory: Negative for cough  Breast  Pain    Right  Lateral   Breast     Swelling  Right  Breast    Cardiovascular: Negative for chest pain  Objective:        Physical Exam  Vitals and nursing note reviewed  Constitutional:       General: She is not in acute distress  Appearance: Normal appearance  She is obese  She is not ill-appearing  HENT:      Head: Normocephalic and atraumatic  Right Ear: Tympanic membrane, ear canal and external ear normal       Left Ear: Tympanic membrane, ear canal and external ear normal       Mouth/Throat:      Pharynx: No posterior oropharyngeal erythema  Eyes:      General:         Right eye: No discharge  Left eye: No discharge  Conjunctiva/sclera: Conjunctivae normal       Comments: Lids   clear     Cardiovascular:      Rate and Rhythm: Normal rate and regular rhythm  Heart sounds: Normal heart sounds  No murmur heard  Pulmonary:      Effort: Pulmonary effort is normal       Breath sounds: Normal breath sounds  Chest:      Chest wall: Tenderness present  Breasts:     Right: Normal  No inverted nipple, mass, skin change or tenderness  Left: Normal  No inverted nipple, mass, skin change or tenderness  Comments: Chest   Wall   Tenderness  Left   Axillary  ribs  Musculoskeletal:      Right lower leg: No edema  Left lower leg: No edema  Lymphadenopathy:      Cervical: No cervical adenopathy  Upper Body:      Right upper body: No supraclavicular or axillary adenopathy  Left upper body: No supraclavicular or axillary adenopathy  Skin:     General: Skin is warm and dry  Neurological:      General: No focal deficit present  Mental Status: She is oriented to person, place, and time  Psychiatric:         Mood and Affect: Mood normal          Behavior: Behavior normal          Thought Content:  Thought content normal          Judgment: Judgment normal

## 2023-05-12 ENCOUNTER — OFFICE VISIT (OUTPATIENT)
Dept: FAMILY MEDICINE CLINIC | Facility: CLINIC | Age: 48
End: 2023-05-12

## 2023-05-12 VITALS
HEART RATE: 81 BPM | TEMPERATURE: 97.3 F | OXYGEN SATURATION: 98 % | DIASTOLIC BLOOD PRESSURE: 76 MMHG | RESPIRATION RATE: 16 BRPM | HEIGHT: 59 IN | WEIGHT: 215 LBS | BODY MASS INDEX: 43.34 KG/M2 | SYSTOLIC BLOOD PRESSURE: 128 MMHG

## 2023-05-12 DIAGNOSIS — G62.9 NEUROPATHY: ICD-10-CM

## 2023-05-12 DIAGNOSIS — E78.5 DYSLIPIDEMIA: ICD-10-CM

## 2023-05-12 DIAGNOSIS — E55.9 VITAMIN D DEFICIENCY: ICD-10-CM

## 2023-05-12 DIAGNOSIS — R10.2 PELVIC PAIN: Primary | ICD-10-CM

## 2023-05-12 DIAGNOSIS — R73.9 HYPERGLYCEMIA: ICD-10-CM

## 2023-05-12 DIAGNOSIS — E66.01 MORBID OBESITY (HCC): ICD-10-CM

## 2023-05-12 DIAGNOSIS — I10 HYPERTENSION, UNSPECIFIED TYPE: ICD-10-CM

## 2023-05-12 NOTE — PROGRESS NOTES
Name: Kirti Vazquez      : 1975      MRN: [id number]  Encounter Provider: Mao Bell MD  Encounter Date: 2023   Encounter department: [address] [place] [address]     1  Pelvic pain  -     US pelvis transabdominal only; Future; Expected date: 2023    2  Morbid obesity ([address] )  -     Comprehensive metabolic panel; Future    3  Vitamin D deficiency  -     Vitamin D 25 hydroxy; Future    4  Hypertension, unspecified type  -     CBC and differential; Future  -     TSH, 3rd generation with Free T4 reflex; Future  -     Comprehensive metabolic panel; Future    5  Dyslipidemia  -     Lipid Panel with Direct LDL reflex; Future    6  Hyperglycemia  -     HEMOGLOBIN A1C W/ EAG ESTIMATION; Future  -     Comprehensive metabolic panel; Future    7  Neuropathy  Comments:  trial sri  Orders:  -     sertraline (ZOLOFT) 50 mg tablet; Take 1 tablet (50 mg total) by mouth daily      Refill patient's medications at this time  Counseling provided in regards to surgical sterilization and hysterectomy  We will obtain ultrasound of the pelvis for evaluation prior to patient's OB/GYN visit  Patient understand that she does have follow-up in regards to her abnormal breast cancer screening tests in September  Follow-up in 3 months         BMI Counseling: Body mass index is 43 42 kg/m²  The BMI is above normal  Nutrition recommendations include reducing portion sizes, 3-5 servings of fruits/vegetables daily and consuming healthier snacks  Exercise recommendations include moderate aerobic physical activity for 150 minutes/week  Subjective     HPI     42-year-old female patient presents for follow-up and discussion regarding sterilization  Patient reports she does have a history of blood clotting disorder, has factor V Leiden, was informed by her OB/GYN that she is not to get pregnant again  Patient has been experiencing irregular periods as well as  Related pain in her lower pelvis  Patient is interesting oophorectomy and a potential hysterectomy as she is does have significant symptoms associated with her no longer want any more kids  Follow-up with OB/GYN as scheduled  Was last seen on 3/23/2023 for rib pain  Also recent lab completed on 6/22/2022  Patient does have history of vitamin D deficiency as well as elevated cholesterol levels  Review of Systems   Constitutional: Negative for chills and fever  HENT: Negative for congestion, rhinorrhea and sore throat  Respiratory: Negative for shortness of breath  Cardiovascular: Positive for chest pain  Pain at the site of the biopsy   Gastrointestinal: Positive for abdominal pain  Negative for constipation, diarrhea, nausea and vomiting  Genitourinary: Positive for vaginal bleeding  Negative for vaginal discharge  Excessive menstrual period   Allergic/Immunologic: Negative for environmental allergies  Neurological: Negative for headaches  Psychiatric/Behavioral: Positive for dysphoric mood  Negative for self-injury, sleep disturbance and suicidal ideas  Patient was last her mother 2 years ago around this week, has recurrent depressive symptoms around this time of year            Past Medical History:   Diagnosis Date   • Complex partial epilepsy ([facility] [address] )     Seisure free since age 25    • Epilepsy ([facility] [address] )    • Factor 5 Leiden mutation, heterozygous ([facility] [address] )    • Fracture, patella    • Pregnancy     Resulted in 1 Living child      Past Surgical History:   Procedure Laterality Date   • BACK SURGERY     • BREAST EXCISIONAL BIOPSY Left 02/14/2004    benign   • MAMMO STEREOTACTIC BREAST BIOPSY LEFT (ALL INC) Left 3/8/2023   • NASAL SEPTUM SURGERY      Deviation Repair    • RHINOPLASTY       Family History   Problem Relation Age of Onset   • Liver cancer Mother         age dx unknown   • Heart attack Father    • Hypertension Father    • Heart disease Father    • No Known Problems Maternal Grandmother    • No Known Problems Maternal Grandfather    • Breast cancer Paternal Grandmother         age dx unknown   • Colon cancer Paternal Grandmother         age dx unknown   • Stomach cancer Paternal Grandfather         age dx unknown   • No Known Problems Brother    • No Known Problems Brother    • No Known Problems Brother    • Breast cancer Maternal Aunt         age dx unknown   • No Known Problems Maternal Aunt    • No Known Problems Maternal Aunt    • No Known Problems Paternal Aunt    • Ovarian cancer Neg Hx    • Uterine cancer Neg Hx    • Cervical cancer Neg Hx      Social History     Socioeconomic History   • Marital status: /Civil Union     Spouse name: None   • Number of children: None   • Years of education: None   • Highest education level: None   Occupational History   • None   Tobacco Use   • Smoking status: Never   • Smokeless tobacco: Never   Vaping Use   • Vaping Use: Never used   Substance and Sexual Activity   • Alcohol use:  Yes     Alcohol/week: 2 0 standard drinks     Types: 2 Shots of liquor per week     Comment: glass wine/day   • Drug use: No   • Sexual activity: Not Currently     Partners: Male     Comment:    Other Topics Concern   • None   Social History Narrative   • None     Social Determinants of Health     Financial Resource Strain: Not on file   Food Insecurity: Not on file   Transportation Needs: Not on file   Physical Activity: Not on file   Stress: Not on file   Social Connections: Not on file   Intimate Partner Violence: Not on file   Housing Stability: Not on file     Current Outpatient Medications on File Prior to Visit   Medication Sig   • albuterol (ProAir HFA) 90 mcg/act inhaler Inhale 2 puffs every 4 (four) hours as needed for wheezing or shortness of breath   • calcium carbonate (OS-MELISSA) 1250 (500 Ca) MG tablet Take 1 tablet (1,250 mg total) by mouth daily   • Cholecalciferol (VITAMIN D PO) Take by mouth   • naproxen (NAPROSYN) 500 mg tablet TAKE 1 TABLET BY MOUTH TWICE A DAY "WITH MEALS   • psyllium (METAMUCIL) 0 52 g capsule Take 0 52 g by mouth in the morning  • Red Yeast Rice 600 MG TABS Take by mouth   • Spacer/Aero Chamber Mouthpiece (SPACER DEVICE) for metered dose inhaler For use with metered dose inhaler  [facility]   • [DISCONTINUED] sertraline (ZOLOFT) 50 mg tablet TAKE 1 TABLET BY MOUTH EVERY DAY   • acetaminophen-codeine (TYLENOL #4) 300-60 MG per tablet Take 1 tablet by mouth every 6 (six) hours as needed for moderate pain (Patient not taking: Reported on 5/12/2023)     Allergies   Allergen Reactions   • Carbamazepine Hives   • Cephalexin Hives   • Flurbiprofen Fatigue   • Phenytoin Hives     Immunization History   Administered Date(s) Administered   • COVID-19 PFIZER VACCINE 0 3 ML IM 04/25/2021, 05/16/2021   • Hep B, Adolescent or Pediatric 09/15/2014, 10/15/2014, 04/18/2015   • Hep B, adult 09/15/2014   • INFLUENZA 09/26/2014, 09/19/2015, 09/24/2015, 11/16/2016, 10/12/2018, 10/10/2020, 12/02/2021   • Influenza Quadrivalent Preservative Free 3 years and older IM 11/16/2016   • Influenza, seasonal, injectable 09/28/2013, 09/19/2015, 09/24/2015   • Influenza, seasonal, injectable, preservative free 09/26/2014   • Tuberculin Skin Test-PPD Intradermal 01/27/2017       Objective     /76 (BP Location: Left arm, Patient Position: Sitting, Cuff Size: Large)   Pulse 81   Temp (!) 97 3 °F (36 3 °C) (Temporal)   Resp 16   Ht 4' 11\" (1 499 m)   Wt 97 5 kg (215 lb)   LMP 04/20/2023 (Exact Date)   SpO2 98%   BMI 43 42 kg/m²     Physical Exam  Vitals reviewed  Constitutional:       General: She is not in acute distress  Appearance: Normal appearance  She is obese  She is not ill-appearing, toxic-appearing or diaphoretic  Cardiovascular:      Rate and Rhythm: Normal rate and regular rhythm  Pulses: Normal pulses  Heart sounds: Normal heart sounds  No murmur heard  Pulmonary:      Effort: Pulmonary effort is normal  No respiratory distress   " Breath sounds: Normal breath sounds  Abdominal:      General: Abdomen is flat  There is no distension  Palpations: Abdomen is soft  Musculoskeletal:         General: No swelling, tenderness, deformity or signs of injury  Normal range of motion  Skin:     General: Skin is warm and dry  Capillary Refill: Capillary refill takes less than 2 seconds  Coloration: Skin is not jaundiced  Neurological:      General: No focal deficit present  Mental Status: She is alert     Psychiatric:         Mood and Affect: Mood normal               Lee Price MD

## 2023-05-19 ENCOUNTER — HOSPITAL ENCOUNTER (OUTPATIENT)
Dept: ULTRASOUND IMAGING | Facility: HOSPITAL | Age: 48
Discharge: HOME | End: 2023-05-19

## 2023-05-19 DIAGNOSIS — R10.2 PELVIC PAIN: ICD-10-CM

## 2023-05-26 ENCOUNTER — ANNUAL EXAM (OUTPATIENT)
Dept: OBGYN CLINIC | Facility: MEDICAL CENTER | Age: 48
End: 2023-05-26

## 2023-05-26 VITALS
HEIGHT: 56 IN | WEIGHT: 216 LBS | BODY MASS INDEX: 48.59 KG/M2 | DIASTOLIC BLOOD PRESSURE: 80 MMHG | SYSTOLIC BLOOD PRESSURE: 130 MMHG

## 2023-05-26 DIAGNOSIS — Z01.419 ENCOUNTER FOR ANNUAL ROUTINE GYNECOLOGICAL EXAMINATION: Primary | ICD-10-CM

## 2023-05-26 DIAGNOSIS — N83.202 LEFT OVARIAN CYST: ICD-10-CM

## 2023-05-26 DIAGNOSIS — Z80.9 FAMILY HISTORY OF CANCER: ICD-10-CM

## 2023-05-26 DIAGNOSIS — N92.0 MENORRHAGIA WITH REGULAR CYCLE: ICD-10-CM

## 2023-05-26 NOTE — ASSESSMENT & PLAN NOTE
C/o worsening menses  Has appt to discuss further with Dr Madhav Hemphill  Recent US by PCP does show changes that may represent adenomyosis- as well as thick ET  Discussed Dr frey may want to do EMB at appt and procedure reviewed

## 2023-05-26 NOTE — PROGRESS NOTES
Subjective:        Felipe Salazar is a 50 y o  female  Here for Gynecologic Exam (Pt is here for an Annual Exam Today/Last Pap: 2019 Normal/mammo: 3/8/2023 Repaet in 6 months/Colon: 2021 Repeat in 5 Years)      GYN HPI  Here for annual gyn exam  Menstrual cycle:  Since coming off depo 2 years ago she has been having worse menses  Very heavy at times  Changing pads every 1-2 hours  Has appt scheduled next week with Dr shante Sanchez to discuss options  - she wants a hysterectomy  She was having some left pelvic pain  PCP ordered US and showed small hemorrhagic cyst (left)   No prior hx of thyroid disorder  Vaginal c/o: Denies  Urinary c/o: Denies  Breast complaints: recent bx on left breast - need mammo  She does do self breast Exams    Sexually active: denies  Contraception: none  She reports she feels safe at home  The following portions of the patient's history were reviewed and updated as appropriate: allergies, current medications, past family history, past medical history, past social history, past surgical history, and problem list     Hereditary Cancer Screening  Cancer-related family history includes Breast cancer in her maternal aunt and paternal grandmother; Colon cancer in her paternal grandmother; Liver cancer in her mother; Stomach cancer in her paternal grandfather  There is no history of Ovarian cancer, Uterine cancer, or Cervical cancer  Substance Abuse Screening Completed  See hx and flowsheet  Screens Positive for none        HEALTH MAINTENANCE SCREENINGS:    History of abnormal pap: No, Last Papanicolaou test:  2019  History of abnormal mammogram: Yes, just recently - had biopsy in March  - benign, Last mammogram:  2022  Last Colon Cancer Screenin2021       Review of Systems   Constitutional: Negative for appetite change, chills, fatigue, fever and unexpected weight change  HENT: Negative  Eyes: Negative      Respiratory: Negative for chest "tightness and shortness of breath  Cardiovascular: Negative for chest pain and palpitations  Gastrointestinal: Negative for abdominal pain, constipation and vomiting  Endocrine: Negative for cold intolerance and heat intolerance  Genitourinary:        As per HPI   Musculoskeletal: Negative for back pain, joint swelling and neck pain  Skin: Negative for color change and rash  Neurological: Negative for dizziness, weakness and numbness  Hematological: Does not bruise/bleed easily  Psychiatric/Behavioral: Negative  Objective:  /80 (BP Location: Left arm, Patient Position: Sitting, Cuff Size: Standard)   Ht 4' 8\" (1 422 m)   Wt 98 kg (216 lb)   LMP 05/17/2023   BMI 48 43 kg/m²        Physical Exam  Constitutional:       General: She is not in acute distress  Appearance: Normal appearance  Genitourinary:      Vulva and rectum normal       No lesions in the vagina  Right Labia: No rash or lesions  Left Labia: No lesions or rash  No vaginal discharge, erythema, tenderness or bleeding  Vaginal exam comments: Challenging bimanual exam due to body habitus/obesity, however no significant uterine or adnexal tenderness or masses noted during exam today           Right Adnexa: not tender and no mass present  Left Adnexa: not tender and no mass present  No cervical motion tenderness, discharge or friability  Uterus is not enlarged or tender  No urethral prolapse present  Pelvic exam was performed with patient in the lithotomy position  Breasts:     Breasts are symmetrical       Right: No inverted nipple, mass, nipple discharge, skin change or tenderness  Left: No inverted nipple, mass, nipple discharge, skin change or tenderness  HENT:      Head: Normocephalic and atraumatic  Cardiovascular:      Rate and Rhythm: Normal rate  Heart sounds: No murmur heard    Pulmonary:      Effort: Pulmonary effort is normal       Breath sounds: " Normal breath sounds  Abdominal:      General: There is no distension  Palpations: Abdomen is soft  Tenderness: There is no abdominal tenderness  Musculoskeletal:         General: Normal range of motion  Cervical back: Normal range of motion  Lymphadenopathy:      Cervical: No cervical adenopathy  Neurological:      Mental Status: She is alert and oriented to person, place, and time  Skin:     General: Skin is warm and dry  Psychiatric:         Mood and Affect: Mood normal          Behavior: Behavior normal    Vitals reviewed  Assessment/Plan:           Rama Rosas- Primary  Annual GYN examination completed today  Risk prevention and anticipatory guidance provided including:  • Risk for hereditary cancers: Family history reviewed and patient does qualify for referral for genetics consult/testing  Referral to genetics oncology offered as indicated  , Colon Cancer Screening: She Is up to date on screening; Next due 2026  • Calcium and vitamin D supplementation  • Dietary and lifestyle recommendations based on her age and weight  body mass index is 48 43 kg/m²       • Tobacco and alcohol use, intervention ordered if applicable  Cervical cancer screening  Previous pap smears and ASCCP screening guidelines have been reviewed  Pap smear is not indicated at this time  Contraception   none    STI Screening  STI screening is declined  STI prevention discussed with use of condoms  Breast exam and breast cancer screening  Breast exam was done; , She is up to date with screening; Next due 9/2023    Problem List Items Addressed This Visit     Family history of cancer     Family history reveals significance for Breast, colon and liver cancer  Reviewed genetics counseling opportunity for pt to further stratify her risks for certain cancers through genetic testing  Also reviewed implicated for changes in screening based on results of testing    Referral entered  Relevant Orders    Ambulatory Referral to Oncology Genetics    Menorrhagia with regular cycle     C/o worsening menses  Has appt to discuss further with Dr Fadia Sandoavl  Recent US by PCP does show changes that may represent adenomyosis- as well as thick ET  Discussed Dr frey may want to do EMB at appt and procedure reviewed  Other Visit Diagnoses     Encounter for annual routine gynecological examination    -  Primary    Left ovarian cyst        Repeat US in 6-8 wks ordered       Relevant Orders    US pelvis complete w transvaginal          Orders Placed This Encounter   Procedures   • US pelvis complete w transvaginal   • Ambulatory Referral to Oncology Genetics

## 2023-05-26 NOTE — ASSESSMENT & PLAN NOTE
Family history reveals significance for Breast, colon and liver cancer  Reviewed genetics counseling opportunity for pt to further stratify her risks for certain cancers through genetic testing  Also reviewed implicated for changes in screening based on results of testing  Referral entered

## 2023-05-30 ENCOUNTER — TELEPHONE (OUTPATIENT)
Dept: HEMATOLOGY ONCOLOGY | Facility: CLINIC | Age: 48
End: 2023-05-30

## 2023-05-30 NOTE — TELEPHONE ENCOUNTER
I called Darby Carlton in response to a referral that was received for patient to establish care with Oncology genetics  Outreach was made to schedule a consultation     I left a voicemail explaining the reason for my call and advised patient to call [facility] at [phone]  The referral has been closed

## 2023-05-31 ENCOUNTER — APPOINTMENT (OUTPATIENT)
Dept: LAB | Facility: MEDICAL CENTER | Age: 48
End: 2023-05-31
Payer: COMMERCIAL

## 2023-05-31 DIAGNOSIS — E66.01 MORBID OBESITY (HCC): ICD-10-CM

## 2023-05-31 DIAGNOSIS — E55.9 VITAMIN D DEFICIENCY: ICD-10-CM

## 2023-05-31 DIAGNOSIS — E78.5 DYSLIPIDEMIA: ICD-10-CM

## 2023-05-31 DIAGNOSIS — I10 HYPERTENSION, UNSPECIFIED TYPE: ICD-10-CM

## 2023-05-31 DIAGNOSIS — R73.9 HYPERGLYCEMIA: ICD-10-CM

## 2023-05-31 LAB
25(OH)D3 SERPL-MCNC: 14.7 NG/ML (ref 30–100)
ALBUMIN SERPL BCP-MCNC: 3.6 G/DL (ref 3.5–5)
ALP SERPL-CCNC: 93 U/L (ref 46–116)
ALT SERPL W P-5'-P-CCNC: 19 U/L (ref 12–78)
ANION GAP SERPL CALCULATED.3IONS-SCNC: 1 MMOL/L (ref 4–13)
AST SERPL W P-5'-P-CCNC: 17 U/L (ref 5–45)
BASOPHILS # BLD AUTO: 0.08 THOUSANDS/ÂΜL (ref 0–0.1)
BASOPHILS NFR BLD AUTO: 1 % (ref 0–1)
BILIRUB SERPL-MCNC: 0.39 MG/DL (ref 0.2–1)
BUN SERPL-MCNC: 16 MG/DL (ref 5–25)
CALCIUM SERPL-MCNC: 8.8 MG/DL (ref 8.3–10.1)
CHLORIDE SERPL-SCNC: 109 MMOL/L (ref 96–108)
CHOLEST SERPL-MCNC: 212 MG/DL
CO2 SERPL-SCNC: 27 MMOL/L (ref 21–32)
CREAT SERPL-MCNC: 0.7 MG/DL (ref 0.6–1.3)
EOSINOPHIL # BLD AUTO: 0.28 THOUSAND/ÂΜL (ref 0–0.61)
EOSINOPHIL NFR BLD AUTO: 3 % (ref 0–6)
ERYTHROCYTE [DISTWIDTH] IN BLOOD BY AUTOMATED COUNT: 13.5 % (ref 11.6–15.1)
EST. AVERAGE GLUCOSE BLD GHB EST-MCNC: 114 MG/DL
GFR SERPL CREATININE-BSD FRML MDRD: 102 ML/MIN/1.73SQ M
GLUCOSE P FAST SERPL-MCNC: 102 MG/DL (ref 65–99)
HBA1C MFR BLD: 5.6 %
HCT VFR BLD AUTO: 38 % (ref 34.8–46.1)
HDLC SERPL-MCNC: 54 MG/DL
HGB BLD-MCNC: 12.2 G/DL (ref 11.5–15.4)
IMM GRANULOCYTES # BLD AUTO: 0.06 THOUSAND/UL (ref 0–0.2)
IMM GRANULOCYTES NFR BLD AUTO: 1 % (ref 0–2)
LDLC SERPL CALC-MCNC: 129 MG/DL (ref 0–100)
LYMPHOCYTES # BLD AUTO: 3.42 THOUSANDS/ÂΜL (ref 0.6–4.47)
LYMPHOCYTES NFR BLD AUTO: 33 % (ref 14–44)
MCH RBC QN AUTO: 30.5 PG (ref 26.8–34.3)
MCHC RBC AUTO-ENTMCNC: 32.1 G/DL (ref 31.4–37.4)
MCV RBC AUTO: 95 FL (ref 82–98)
MONOCYTES # BLD AUTO: 0.56 THOUSAND/ÂΜL (ref 0.17–1.22)
MONOCYTES NFR BLD AUTO: 5 % (ref 4–12)
NEUTROPHILS # BLD AUTO: 6.09 THOUSANDS/ÂΜL (ref 1.85–7.62)
NEUTS SEG NFR BLD AUTO: 57 % (ref 43–75)
NRBC BLD AUTO-RTO: 0 /100 WBCS
PLATELET # BLD AUTO: 370 THOUSANDS/UL (ref 149–390)
PMV BLD AUTO: 11.5 FL (ref 8.9–12.7)
POTASSIUM SERPL-SCNC: 4.7 MMOL/L (ref 3.5–5.3)
PROT SERPL-MCNC: 6.9 G/DL (ref 6.4–8.4)
RBC # BLD AUTO: 4 MILLION/UL (ref 3.81–5.12)
SODIUM SERPL-SCNC: 137 MMOL/L (ref 135–147)
TRIGL SERPL-MCNC: 144 MG/DL
TSH SERPL DL<=0.05 MIU/L-ACNC: 2.46 UIU/ML (ref 0.45–4.5)
WBC # BLD AUTO: 10.49 THOUSAND/UL (ref 4.31–10.16)

## 2023-05-31 PROCEDURE — 36415 COLL VENOUS BLD VENIPUNCTURE: CPT

## 2023-05-31 PROCEDURE — 84443 ASSAY THYROID STIM HORMONE: CPT

## 2023-05-31 PROCEDURE — 80061 LIPID PANEL: CPT

## 2023-05-31 PROCEDURE — 83036 HEMOGLOBIN GLYCOSYLATED A1C: CPT

## 2023-05-31 PROCEDURE — 80053 COMPREHEN METABOLIC PANEL: CPT

## 2023-05-31 PROCEDURE — 85025 COMPLETE CBC W/AUTO DIFF WBC: CPT

## 2023-05-31 PROCEDURE — 82306 VITAMIN D 25 HYDROXY: CPT

## 2023-06-01 DIAGNOSIS — E55.9 VITAMIN D DEFICIENCY: Primary | ICD-10-CM

## 2023-06-01 RX ORDER — ERGOCALCIFEROL 1.25 MG/1
50000 CAPSULE ORAL WEEKLY
Qty: 12 CAPSULE | Refills: 0 | Status: SHIPPED | OUTPATIENT
Start: 2023-06-01

## 2023-06-02 ENCOUNTER — OFFICE VISIT (OUTPATIENT)
Dept: OBGYN CLINIC | Facility: CLINIC | Age: 48
End: 2023-06-02

## 2023-06-02 VITALS
BODY MASS INDEX: 42.84 KG/M2 | DIASTOLIC BLOOD PRESSURE: 92 MMHG | SYSTOLIC BLOOD PRESSURE: 144 MMHG | WEIGHT: 218.2 LBS | HEIGHT: 60 IN

## 2023-06-02 DIAGNOSIS — N92.0 MENORRHAGIA WITH REGULAR CYCLE: Primary | ICD-10-CM

## 2023-06-02 PROBLEM — K62.9 PERIANAL LESION: Status: RESOLVED | Noted: 2021-10-21 | Resolved: 2023-06-02

## 2023-06-02 NOTE — PROGRESS NOTES
Assessment/Plan     Menorrhagia with regular cycle     I had a lengthy discussion with the patient regarding her bleeding and consideration for endometrial ablation versus hysterectomy  Procedure, risks, reasons, benefits and complications (including injury to bowel, bladder, major blood vessel, ureter, bleeding, possibility of transfusion, infection, or fistula formation) were reviewed in detail  Consent was signed and preop drink, wash, and instructions were given  Plan perioperative heparin for VTE prpophylaxis  Subjective     Felipe Salazar is a 50 y o   1 para 1, female  I was consulted regarding heavy bleeding  Onset of symptoms was gradual starting 2 years ago with gradually worsening course since that time  Bleeding is characterized as heavy  Length of bleeding: 3 days  Associated symptoms include diarrhea, headache and nausea  Evaluation to date: pelvic ultrasound: negative except for thickened endometrium (on cycle)  Treatment to date: none; was on Depoprovera until 2 years ago      Pertinent Gyn History:    Menses flow is excessive with use of 1 pads per 30 minutes  Contraception: abstinence  LUIS exposure: denies  Blood transfusions: none  STDs: no past history  Preventive screening:   Last mammogram: abnormal: 2022; left biopsy benign  Last pap: normal Date: 2019    Past Medical History:   Diagnosis Date   • Asthma 1993   • Complex partial epilepsy ([facility] [address] )     Seisure free since age 25    • Epilepsy ([facility] [address] )    • Factor 5 Leiden mutation, heterozygous ([facility] [address] )    • Fracture, patella    • Lupus ([facility] [address] ) 3/29/2002   • Pregnancy     Resulted in 1 Living child    • TIA (transient ischemic attack) 89       Past Surgical History:   Procedure Laterality Date   • BACK SURGERY     • BREAST EXCISIONAL BIOPSY Left 2004    benign   • MAMMO STEREOTACTIC BREAST BIOPSY LEFT (ALL INC) Left 3/8/2023   • NASAL SEPTUM SURGERY      Deviation Repair    • RHINOPLASTY         Current Outpatient Medications on File Prior to Visit   Medication Sig   • albuterol (ProAir HFA) 90 mcg/act inhaler Inhale 2 puffs every 4 (four) hours as needed for wheezing or shortness of breath   • calcium carbonate (OS-MELISSA) 1250 (500 Ca) MG tablet Take 1 tablet (1,250 mg total) by mouth daily   • Cholecalciferol (VITAMIN D PO) Take by mouth   • naproxen (NAPROSYN) 500 mg tablet TAKE 1 TABLET BY MOUTH TWICE A DAY WITH MEALS   • psyllium (METAMUCIL) 0 52 g capsule Take 0 52 g by mouth in the morning  • Red Yeast Rice 600 MG TABS Take by mouth   • sertraline (ZOLOFT) 50 mg tablet Take 1 tablet (50 mg total) by mouth daily   • Spacer/Aero Chamber Mouthpiece (SPACER DEVICE) for metered dose inhaler For use with metered dose inhaler  [facility]   • ergocalciferol (VITAMIN D2) 50,000 units Take 1 capsule (50,000 Units total) by mouth once a week     No current facility-administered medications on file prior to visit  Allergies   Allergen Reactions   • Carbamazepine Hives   • Cephalexin Hives   • Flurbiprofen Fatigue   • Phenytoin Hives       Social History     Tobacco Use   • Smoking status: Never   • Smokeless tobacco: Never   Vaping Use   • Vaping Use: Never used   Substance Use Topics   • Alcohol use:  Yes     Alcohol/week: 7 0 standard drinks of alcohol     Types: 5 Glasses of wine, 2 Shots of liquor per week     Comment: glass wine/day   • Drug use: Never       Family History   Problem Relation Age of Onset   • Liver cancer Mother         age dx unknown   • Colon cancer Mother    • Cancer Mother         Liver and lung cancer   • Heart attack Father    • Hypertension Father    • Heart disease Father    • Multiple myeloma Brother    • No Known Problems Brother    • No Known Problems Brother    • No Known Problems Maternal Grandmother    • No Known Problems Maternal Grandfather    • Breast cancer Paternal Grandmother         age dx unknown   • Colon cancer Paternal Grandmother         age dx unknown   • Stomach "cancer Paternal Grandfather         age dx unknown   • Breast cancer Maternal Aunt         age dx unknown   • No Known Problems Maternal Aunt    • No Known Problems Maternal Aunt    • No Known Problems Paternal Aunt    • Ovarian cancer Neg Hx    • Uterine cancer Neg Hx    • Cervical cancer Neg Hx          Review of Systems  Pertinent items are noted in HPI       Objective     /92 (BP Location: Right arm, Patient Position: Sitting, Cuff Size: Large)   Ht 4' 11 5\" (1 511 m)   Wt 99 kg (218 lb 3 2 oz)   LMP 05/17/2023 (Exact Date)   BMI 43 33 kg/m²     General:   alert and oriented, in no acute distress   Heart: regular rate and rhythm   Lungs: effort normal   Abdomen: soft, non-tender, without masses or organomegaly   Ext: No edema     "

## 2023-06-06 ENCOUNTER — TELEPHONE (OUTPATIENT)
Dept: OBGYN CLINIC | Facility: CLINIC | Age: 48
End: 2023-06-06

## 2023-06-06 NOTE — TELEPHONE ENCOUNTER
Talked to patient she is scheduled for her surgical procedure on 10/2/2023 with Dr Etta Knapp in the [facility]   Surgical packet mailed out today

## 2023-06-12 ENCOUNTER — TELEPHONE (OUTPATIENT)
Dept: HEMATOLOGY ONCOLOGY | Facility: CLINIC | Age: 48
End: 2023-06-12

## 2023-06-12 NOTE — TELEPHONE ENCOUNTER
[facility]   Patient Name  (First and Last) Bridgette Hurtado   Patient Date of Birth 1975   Patient's e-mail address               [email or web address]   Appointment date and time 11/28/2023 @10:30AM    Was patient advised that this questionnaire must be completed on a computer?  Yes

## 2023-06-12 NOTE — TELEPHONE ENCOUNTER
Patient Call    Who are you speaking with? Patient    If it is not the patient, are they listed on an active communication consent form? N/A   What is the reason for this call? Patient calling in wanting to schedule a consult with genetics  Does this require a call back? Yes   If a call back is required, please list best call back number [phone]   If a call back is required, advise that a message will be forwarded to their care team and someone will return their call as soon as possible  Did you relay this information to the patient?  Yes

## 2023-06-12 NOTE — TELEPHONE ENCOUNTER
I spoke with Bandar [place] to schedule their consultation with Cancer Risk and Genetics  Scheduling Outcome: Patient is scheduled for an appointment on 11/28/2023 at 10:30AM  with Ambreen Varela     Personal/Family History Related to Appointment:     Personal History of Cancer: Patient reports no personal history of cancer    Family History of Cancer: Patient reports family history of liver ca- Mother  lung ca- mother and PA Grandmother  breast ca- MA Aunt and PA grandmother  cervical ca- PA Grandmother  colon ca- PA Grandmother  skin cancer- PA grandfather and cousin  stomach ca- PA grandfather  ocular melanoma- PA cousin  Is patient of [facility]?: No    History of Genetic Testing:  Personal History of Genetic Testing: Patient report no personal history of Genetic Testing  Family History of Genetic Testing: Patient reports that no family members have had Genetic Testing  Progeny:  Is patient able to complete our family history questionnaire on a computer?:  Yes    Patient's preferred e-mail address: [email or web address]

## 2023-07-06 ENCOUNTER — HOSPITAL ENCOUNTER (OUTPATIENT)
Dept: RADIOLOGY | Age: 48
Discharge: HOME | End: 2023-07-06
Payer: COMMERCIAL

## 2023-07-06 DIAGNOSIS — N83.202 LEFT OVARIAN CYST: ICD-10-CM

## 2023-07-06 PROCEDURE — 76830 TRANSVAGINAL US NON-OB: CPT

## 2023-07-06 PROCEDURE — 76856 US EXAM PELVIC COMPLETE: CPT

## 2023-07-12 ENCOUNTER — TELEPHONE (OUTPATIENT)
Dept: OBGYN CLINIC | Facility: CLINIC | Age: 48
End: 2023-07-12

## 2023-07-12 ENCOUNTER — APPOINTMENT (OUTPATIENT)
Dept: LAB | Facility: MEDICAL CENTER | Age: 48
End: 2023-07-12
Payer: COMMERCIAL

## 2023-07-12 DIAGNOSIS — N92.0 MENORRHAGIA WITH REGULAR CYCLE: Primary | ICD-10-CM

## 2023-07-12 DIAGNOSIS — R53.83 FATIGUE, UNSPECIFIED TYPE: ICD-10-CM

## 2023-07-12 DIAGNOSIS — R53.83 FATIGUE, UNSPECIFIED TYPE: Primary | ICD-10-CM

## 2023-07-12 LAB
BASOPHILS # BLD AUTO: 0.07 THOUSANDS/ÂΜL (ref 0–0.1)
BASOPHILS NFR BLD AUTO: 1 % (ref 0–1)
EOSINOPHIL # BLD AUTO: 0.23 THOUSAND/ÂΜL (ref 0–0.61)
EOSINOPHIL NFR BLD AUTO: 2 % (ref 0–6)
ERYTHROCYTE [DISTWIDTH] IN BLOOD BY AUTOMATED COUNT: 13.2 % (ref 11.6–15.1)
HCT VFR BLD AUTO: 38.4 % (ref 34.8–46.1)
HGB BLD-MCNC: 12.5 G/DL (ref 11.5–15.4)
IMM GRANULOCYTES # BLD AUTO: 0.04 THOUSAND/UL (ref 0–0.2)
IMM GRANULOCYTES NFR BLD AUTO: 0 % (ref 0–2)
LYMPHOCYTES # BLD AUTO: 3.78 THOUSANDS/ÂΜL (ref 0.6–4.47)
LYMPHOCYTES NFR BLD AUTO: 31 % (ref 14–44)
MCH RBC QN AUTO: 30.6 PG (ref 26.8–34.3)
MCHC RBC AUTO-ENTMCNC: 32.6 G/DL (ref 31.4–37.4)
MCV RBC AUTO: 94 FL (ref 82–98)
MONOCYTES # BLD AUTO: 0.68 THOUSAND/ÂΜL (ref 0.17–1.22)
MONOCYTES NFR BLD AUTO: 6 % (ref 4–12)
NEUTROPHILS # BLD AUTO: 7.41 THOUSANDS/ÂΜL (ref 1.85–7.62)
NEUTS SEG NFR BLD AUTO: 60 % (ref 43–75)
NRBC BLD AUTO-RTO: 0 /100 WBCS
PLATELET # BLD AUTO: 408 THOUSANDS/UL (ref 149–390)
PMV BLD AUTO: 11.2 FL (ref 8.9–12.7)
RBC # BLD AUTO: 4.08 MILLION/UL (ref 3.81–5.12)
WBC # BLD AUTO: 12.21 THOUSAND/UL (ref 4.31–10.16)

## 2023-07-12 PROCEDURE — 36415 COLL VENOUS BLD VENIPUNCTURE: CPT

## 2023-07-12 PROCEDURE — 85025 COMPLETE CBC W/AUTO DIFF WBC: CPT

## 2023-07-12 RX ORDER — MEDROXYPROGESTERONE ACETATE 10 MG/1
10 TABLET ORAL 2 TIMES DAILY
Qty: 20 TABLET | Refills: 0 | Status: SHIPPED | OUTPATIENT
Start: 2023-07-12 | End: 2023-07-22

## 2023-07-12 NOTE — TELEPHONE ENCOUNTER
I lm for pt. Can start provera - 2 x a day. Must finish the rx. Will bleed when rx finishes. Call with questions

## 2023-07-12 NOTE — TELEPHONE ENCOUNTER
Patient wanted to know if the provera or TXA is okay to take with her blood disorder Factor V Leiden. She will go for CBC. Will route to provide for further insight.

## 2023-07-12 NOTE — TELEPHONE ENCOUNTER
Periods have been getting progressively heavier- found cyst on US, repeat US not read yet.     Fever was low grade 99, currently on period. Day 4 and its still heavy with clots but normally its gone by now. yesterday had severe pain and burning sensation in abdomen-but today its just pressure where cyst is, like usual.    Patient took tylenol with codeine for pain    Also very fatigued- needed to go to bed at 1 pm yesterday.      Nausea for last two cycles. Cycles are getting closer together. Only went 12 days in between cycles.

## 2023-07-12 NOTE — TELEPHONE ENCOUNTER
Patient is experiencing heavy bleeding with fever - wants to know if this is a normal thing - she had an ultrasound done last week but results are not in yet

## 2023-07-12 NOTE — TELEPHONE ENCOUNTER
Spoke with patient aware of [facility] message. She would like to start the Provera. Confirmed pharmacy in chart.

## 2023-07-13 ENCOUNTER — TELEPHONE (OUTPATIENT)
Dept: OBGYN CLINIC | Facility: CLINIC | Age: 48
End: 2023-07-13

## 2023-07-13 NOTE — TELEPHONE ENCOUNTER
Patient asked to go over her lab results from 7/12 - asked if Dr. Antoni Faustin has seen the results yet

## 2023-07-14 ENCOUNTER — TELEPHONE (OUTPATIENT)
Dept: OBGYN CLINIC | Facility: CLINIC | Age: 48
End: 2023-07-14

## 2023-07-14 NOTE — TELEPHONE ENCOUNTER
----- Message from Isa Martinez. Pao sent at 7/14/2023  7:56 AM EDT -----  Regarding: results  Contact: [phone]  Yes, my left side. It’s about a 6, when I walk a lot or bend over it’s about 9 or 10.

## 2023-07-31 ENCOUNTER — TELEPHONE (OUTPATIENT)
Dept: OBGYN CLINIC | Facility: CLINIC | Age: 48
End: 2023-07-31

## 2023-07-31 DIAGNOSIS — N92.0 MENORRHAGIA WITH REGULAR CYCLE: ICD-10-CM

## 2023-07-31 RX ORDER — MEDROXYPROGESTERONE ACETATE 10 MG/1
10 TABLET ORAL 2 TIMES DAILY
Qty: 20 TABLET | Refills: 0 | Status: SHIPPED | OUTPATIENT
Start: 2023-07-31 | End: 2023-08-10

## 2023-07-31 NOTE — TELEPHONE ENCOUNTER
----- Message from Maritza Cedeno sent at 7/31/2023  8:05 AM EDT -----  Regarding: results  Contact: [phone]  Ok, has she sent in a prescription for the hormones yet? I have not had a notice from [employer] yet.

## 2023-07-31 NOTE — TELEPHONE ENCOUNTER
----- Message from Bertin Cedeno sent at 7/31/2023 10:12 AM EDT -----  Regarding: re  Contact: [phone]  Hi, I took all doses as directed. I am now out.

## 2023-08-24 DIAGNOSIS — E55.9 VITAMIN D DEFICIENCY: ICD-10-CM

## 2023-08-24 RX ORDER — ERGOCALCIFEROL 1.25 MG/1
CAPSULE ORAL
Qty: 12 CAPSULE | Refills: 0 | Status: SHIPPED | OUTPATIENT
Start: 2023-08-24

## 2023-08-28 ENCOUNTER — OFFICE VISIT (OUTPATIENT)
Dept: OBGYN CLINIC | Facility: CLINIC | Age: 48
End: 2023-08-28
Payer: COMMERCIAL

## 2023-08-28 VITALS
DIASTOLIC BLOOD PRESSURE: 86 MMHG | BODY MASS INDEX: 42.25 KG/M2 | WEIGHT: 215.2 LBS | SYSTOLIC BLOOD PRESSURE: 124 MMHG | HEIGHT: 60 IN

## 2023-08-28 DIAGNOSIS — Z01.818 PREOP TESTING: ICD-10-CM

## 2023-08-28 DIAGNOSIS — Z80.9 FAMILY HISTORY OF CANCER: ICD-10-CM

## 2023-08-28 DIAGNOSIS — N92.0 MENORRHAGIA WITH REGULAR CYCLE: Primary | ICD-10-CM

## 2023-08-28 DIAGNOSIS — D68.51 FACTOR V LEIDEN MUTATION (HCC): ICD-10-CM

## 2023-08-28 DIAGNOSIS — E66.01 MORBID OBESITY (HCC): ICD-10-CM

## 2023-08-28 PROCEDURE — 88305 TISSUE EXAM BY PATHOLOGIST: CPT | Performed by: PATHOLOGY

## 2023-08-28 PROCEDURE — 58100 BIOPSY OF UTERUS LINING: CPT | Performed by: STUDENT IN AN ORGANIZED HEALTH CARE EDUCATION/TRAINING PROGRAM

## 2023-08-28 PROCEDURE — 99214 OFFICE O/P EST MOD 30 MIN: CPT | Performed by: STUDENT IN AN ORGANIZED HEALTH CARE EDUCATION/TRAINING PROGRAM

## 2023-08-28 NOTE — PROGRESS NOTES
Endometrial biopsy    Date/Time: 8/28/2023 1:20 PM    Performed by: Rancho Koch MD  Authorized by: Rancho Koch MD  Universal Protocol:  Consent: Verbal consent obtained. Written consent not obtained. Risks and benefits: risks, benefits and alternatives were discussed  Consent given by: patient  Time out: Immediately prior to procedure a "time out" was called to verify the correct patient, procedure, equipment, support staff and site/side marked as required. Patient understanding: patient states understanding of the procedure being performed  Patient consent: the patient's understanding of the procedure matches consent given  Procedure consent: procedure consent matches procedure scheduled  Test results: test results available and properly labeled  Site marked: the operative site was not marked  Patient identity confirmed: verbally with patient      Indication:     Indications:  Other disorder of menstruation and other abnormal bleeding from female genital tract    Procedure:     Procedure: endometrial biopsy with Pipelle      A bivalve speculum was placed in the vagina: yes      Cervix cleaned and prepped: yes      The cervix was dilated: no      Uterus sounded: yes      Uterus sound depth (cm):  9    Specimen collected: specimen collected and sent to pathology      Patient tolerated procedure well with no complications: yes    Findings:     Cervix: normal

## 2023-08-28 NOTE — PROGRESS NOTES
Assessment/Plan:     Problem List Items Addressed This Visit        Hematopoietic and Hemostatic    Factor V Leiden mutation ([address])       Other    Morbid obesity ([address])    Family history of cancer    Menorrhagia with regular cycle - Primary    Relevant Orders    Tissue Exam   Other Visit Diagnoses     Preop testing        Relevant Orders    Endometrial biopsy        - reviewed management of heavy menstrual bleeding in detail. Reviewed medical management to include depo provera, progesterone, LNG-IUD. She is not interested in any of these options. Reviewed option for ablation, though she is also interested in oophorectomy for perceived pain from cysts, cancer risk reduction. -reviewed risk of hyperplasia/malignancy as cause of abnormal bleeding. Needs EMB. Performed today. Desires definitive management with total hysterectomy: we discussed risks, benefits, alternatives. We discussed risks including bleeding, infection, damage to surrounding organs (bowel, bladder), nerves and vessels, DVT/PE, very unlikely risk of death. Increased from baseline risk of DVT/PE, as well as bleeding given planned use of heparin at the time of surgery. Increased risk anesthesia, given BMI. Minimal risks with blood transfusion to include transfusion reaction, TRALI, rare transmission of HIV/Hepatitis C. Discussed bilateral salpingectomy, oophorectomy and benefit of decreased risk of ovarian cancer, cystoscopy. Benefits include removal cervical dysplasia, decreased risk ovarian cancer. Patient wishes to proceed. -Will plan to proceed with TLH/BSO, cystoscopy. Consent form reviewed in detail with patient, signed by patient  - Discussed that she will need heparin at the time of surgery  - Discussed that she is not a candidate for HRT, reviewed symptoms of menopause    Subjective:      Patient ID: Og Andrade is a 50 y.o. female. HPI  She presents today for preop visit for TLH.  She was previously scheduled for same, see separate HPI. The following portions of the patient's history were reviewed and updated as appropriate: allergies, current medications, past family history, past medical history, past social history, past surgical history and problem list.    Review of Systems  +heavy and irregular menstrual bleeding +cramping +lower back pain +pelvic pain    Objective:  /86 (BP Location: Right arm, Patient Position: Sitting, Cuff Size: Large)   Ht 4' 11.5" (1.511 m)   Wt 97.6 kg (215 lb 3.2 oz)   LMP 08/21/2023 (Exact Date)   BMI 42.74 kg/m²      Physical Exam  Vitals reviewed. Constitutional:       Appearance: She is well-developed. HENT:      Head: Normocephalic. Cardiovascular:      Rate and Rhythm: Normal rate. Pulmonary:      Effort: Pulmonary effort is normal.   Abdominal:      General: There is no distension. Palpations: Abdomen is soft. Tenderness: There is no abdominal tenderness. There is no guarding or rebound. Genitourinary:     General: Normal vulva. Exam position: Lithotomy position. Vagina: No bleeding. Cervix: No cervical motion tenderness. Comments: No descensus, narrow vaginal canal  Musculoskeletal:         General: Normal range of motion. Cervical back: Normal range of motion. Skin:     General: Skin is warm and dry. Neurological:      Mental Status: She is alert and oriented to person, place, and time.    Psychiatric:         Behavior: Behavior normal.         Overall MDM: moderate   Diagnosis moderate, Data low, Risk moderate

## 2023-09-06 ENCOUNTER — OFFICE VISIT (OUTPATIENT)
Dept: FAMILY MEDICINE CLINIC | Facility: CLINIC | Age: 48
End: 2023-09-06
Payer: COMMERCIAL

## 2023-09-06 VITALS
HEIGHT: 60 IN | OXYGEN SATURATION: 98 % | DIASTOLIC BLOOD PRESSURE: 76 MMHG | WEIGHT: 214.6 LBS | HEART RATE: 76 BPM | BODY MASS INDEX: 42.13 KG/M2 | SYSTOLIC BLOOD PRESSURE: 134 MMHG | TEMPERATURE: 97.1 F

## 2023-09-06 DIAGNOSIS — S29.019A THORACIC MYOFASCIAL STRAIN, INITIAL ENCOUNTER: Primary | ICD-10-CM

## 2023-09-06 DIAGNOSIS — N76.4 VULVAR ABSCESS: ICD-10-CM

## 2023-09-06 PROCEDURE — 99214 OFFICE O/P EST MOD 30 MIN: CPT | Performed by: FAMILY MEDICINE

## 2023-09-06 RX ORDER — IBUPROFEN 600 MG/1
600 TABLET ORAL EVERY 6 HOURS PRN
Qty: 90 TABLET | Refills: 1 | Status: SHIPPED | OUTPATIENT
Start: 2023-09-06

## 2023-09-06 NOTE — PROGRESS NOTES
Assessment/Plan:    1. Thoracic myofascial strain, initial encounter  -     ibuprofen (MOTRIN) 600 mg tablet; Take 1 tablet (600 mg total) by mouth every 6 (six) hours as needed for mild pain or moderate pain    2. Vulvar abscess  Comments:  sitz bath, epsom salt soak, warm ocmrpess        Patient Instructions   Switch from aleve to ibuprofen for better efficacy and less s/e with stomach  Sitz baths and warm compresses for vulvar area, f/u if not improving      Return if symptoms worsen or fail to improve. Subjective:      Patient ID: Kylee Granados is a 50 y.o. female. Chief Complaint   Patient presents with   • lump inside vagina     Blood with wiping today       Noticed lump inside the vagina yesterday. No trauma, lesion did leak some blood tinged fluid this AM, so less firm today but still present. The following portions of the patient's history were reviewed and updated as appropriate: allergies, current medications, past family history, past medical history, past social history, past surgical history and problem list.    Review of Systems   Constitutional: Negative for fatigue and fever. HENT: Negative for congestion. Eyes: Negative for visual disturbance. Respiratory: Negative for chest tightness and shortness of breath. Cardiovascular: Negative for chest pain and palpitations. Gastrointestinal: Negative for abdominal pain. Genitourinary: Positive for vaginal bleeding, vaginal discharge and vaginal pain. Negative for difficulty urinating. Musculoskeletal: Negative for arthralgias. Neurological: Negative for headaches. Hematological: Does not bruise/bleed easily.          Current Outpatient Medications   Medication Sig Dispense Refill   • albuterol (ProAir HFA) 90 mcg/act inhaler Inhale 2 puffs every 4 (four) hours as needed for wheezing or shortness of breath 18 g 3   • calcium carbonate (OS-MELISSA) 1250 (500 Ca) MG tablet Take 1 tablet (1,250 mg total) by mouth daily 150 tablet 3   • Cholecalciferol (VITAMIN D PO) Take by mouth     • ergocalciferol (VITAMIN D2) 50,000 units TAKE 1 CAPSULE BY MOUTH ONE TIME PER WEEK 12 capsule 0   • ibuprofen (MOTRIN) 600 mg tablet Take 1 tablet (600 mg total) by mouth every 6 (six) hours as needed for mild pain or moderate pain 90 tablet 1   • medroxyPROGESTERone (PROVERA) 10 mg tablet Take 1 tablet (10 mg total) by mouth 2 (two) times a day for 10 days 20 tablet 0   • psyllium (METAMUCIL) 0.52 g capsule Take 0.52 g by mouth in the morning. • Red Yeast Rice 600 MG TABS Take by mouth     • sertraline (ZOLOFT) 50 mg tablet Take 1 tablet (50 mg total) by mouth daily 90 tablet 1   • Spacer/Aero Chamber Mouthpiece (SPACER DEVICE) for metered dose inhaler For use with metered dose inhaler. [facility] 1 Device 0     No current facility-administered medications for this visit. Objective:    /76 (BP Location: Right arm, Patient Position: Sitting, Cuff Size: Large)   Pulse 76   Temp (!) 97.1 °F (36.2 °C) (Temporal)   Ht 4' 11.5" (1.511 m)   Wt 97.3 kg (214 lb 9.6 oz)   LMP 08/21/2023 (Exact Date)   SpO2 98%   BMI 42.62 kg/m²        Physical Exam  Vitals reviewed. Constitutional:       Appearance: Normal appearance. She is well-developed. Cardiovascular:      Rate and Rhythm: Normal rate. Pulmonary:      Effort: Pulmonary effort is normal.   Genitourinary:     Exam position: Lithotomy position. Pubic Area: No rash. Labia:         Right: Tenderness (slightkly increased redness, no discernible lump,  in the general area, but maybe a cyst ruptured partially) present. No rash, lesion or injury. Comments: No skin breakdown, no open wound, mild redness, no concrete mass or lump, but tender  Musculoskeletal:         General: Tenderness (along thoracic spine and paraspinal muscles) present. Lymphadenopathy:      Lower Body: No right inguinal adenopathy. No left inguinal adenopathy.    Skin: General: Skin is warm. Neurological:      Mental Status: She is alert and oriented to person, place, and time. Psychiatric:         Mood and Affect: Mood normal.         Behavior: Behavior normal.         Thought Content:  Thought content normal.         Judgment: Judgment normal.                Rocco Niño MD

## 2023-09-07 NOTE — PATIENT INSTRUCTIONS
Switch from aleve to ibuprofen for better efficacy and less s/e with stomach  Sitz baths and warm compresses for vulvar area, f/u if not improving

## 2023-09-08 ENCOUNTER — LAB REQUISITION (OUTPATIENT)
Dept: LAB | Facility: HOSPITAL | Age: 48
End: 2023-09-08
Payer: COMMERCIAL

## 2023-09-08 ENCOUNTER — APPOINTMENT (OUTPATIENT)
Dept: LAB | Facility: MEDICAL CENTER | Age: 48
End: 2023-09-08
Payer: COMMERCIAL

## 2023-09-08 DIAGNOSIS — Z01.818 PRE-OP TESTING: ICD-10-CM

## 2023-09-08 DIAGNOSIS — Z01.818 ENCOUNTER FOR OTHER PREPROCEDURAL EXAMINATION: ICD-10-CM

## 2023-09-08 LAB
ABO GROUP BLD: NORMAL
ANION GAP SERPL CALCULATED.3IONS-SCNC: 8 MMOL/L
BLD GP AB SCN SERPL QL: NEGATIVE
BUN SERPL-MCNC: 17 MG/DL (ref 5–25)
CALCIUM SERPL-MCNC: 9.1 MG/DL (ref 8.4–10.2)
CHLORIDE SERPL-SCNC: 104 MMOL/L (ref 96–108)
CO2 SERPL-SCNC: 29 MMOL/L (ref 21–32)
CREAT SERPL-MCNC: 0.75 MG/DL (ref 0.6–1.3)
ERYTHROCYTE [DISTWIDTH] IN BLOOD BY AUTOMATED COUNT: 13.2 % (ref 11.6–15.1)
GFR SERPL CREATININE-BSD FRML MDRD: 94 ML/MIN/1.73SQ M
GLUCOSE P FAST SERPL-MCNC: 111 MG/DL (ref 65–99)
HCT VFR BLD AUTO: 41.7 % (ref 34.8–46.1)
HGB BLD-MCNC: 13.2 G/DL (ref 11.5–15.4)
MCH RBC QN AUTO: 30.5 PG (ref 26.8–34.3)
MCHC RBC AUTO-ENTMCNC: 31.7 G/DL (ref 31.4–37.4)
MCV RBC AUTO: 96 FL (ref 82–98)
PLATELET # BLD AUTO: 348 THOUSANDS/UL (ref 149–390)
PMV BLD AUTO: 11.6 FL (ref 8.9–12.7)
POTASSIUM SERPL-SCNC: 4.6 MMOL/L (ref 3.5–5.3)
RBC # BLD AUTO: 4.33 MILLION/UL (ref 3.81–5.12)
RH BLD: POSITIVE
SODIUM SERPL-SCNC: 141 MMOL/L (ref 135–147)
SPECIMEN EXPIRATION DATE: NORMAL
WBC # BLD AUTO: 9.44 THOUSAND/UL (ref 4.31–10.16)

## 2023-09-08 PROCEDURE — 86900 BLOOD TYPING SEROLOGIC ABO: CPT | Performed by: OBSTETRICS & GYNECOLOGY

## 2023-09-08 PROCEDURE — 87086 URINE CULTURE/COLONY COUNT: CPT

## 2023-09-08 PROCEDURE — 86901 BLOOD TYPING SEROLOGIC RH(D): CPT | Performed by: OBSTETRICS & GYNECOLOGY

## 2023-09-08 PROCEDURE — 85027 COMPLETE CBC AUTOMATED: CPT

## 2023-09-08 PROCEDURE — 86850 RBC ANTIBODY SCREEN: CPT | Performed by: OBSTETRICS & GYNECOLOGY

## 2023-09-08 PROCEDURE — 36415 COLL VENOUS BLD VENIPUNCTURE: CPT

## 2023-09-08 PROCEDURE — 80048 BASIC METABOLIC PNL TOTAL CA: CPT

## 2023-09-08 PROCEDURE — 83036 HEMOGLOBIN GLYCOSYLATED A1C: CPT

## 2023-09-09 LAB
BACTERIA UR CULT: NORMAL
EST. AVERAGE GLUCOSE BLD GHB EST-MCNC: 128 MG/DL
HBA1C MFR BLD: 6.1 %

## 2023-09-13 ENCOUNTER — APPOINTMENT (OUTPATIENT)
Dept: LAB | Age: 48
End: 2023-09-13
Payer: COMMERCIAL

## 2023-09-13 ENCOUNTER — HOSPITAL ENCOUNTER (OUTPATIENT)
Dept: MAMMOGRAPHY | Facility: CLINIC | Age: 48
Discharge: HOME | End: 2023-09-13
Payer: COMMERCIAL

## 2023-09-13 VITALS — BODY MASS INDEX: 43.14 KG/M2 | HEIGHT: 59 IN | WEIGHT: 214 LBS

## 2023-09-13 DIAGNOSIS — Z01.818 PRE-OP TESTING: ICD-10-CM

## 2023-09-13 DIAGNOSIS — R92.8 ABNORMAL MAMMOGRAM: ICD-10-CM

## 2023-09-13 LAB
ATRIAL RATE: 83 BPM
P AXIS: 68 DEGREES
PR INTERVAL: 130 MS
QRS AXIS: 57 DEGREES
QRSD INTERVAL: 76 MS
QT INTERVAL: 376 MS
QTC INTERVAL: 441 MS
T WAVE AXIS: 49 DEGREES
VENTRICULAR RATE: 83 BPM

## 2023-09-13 PROCEDURE — G0279 TOMOSYNTHESIS, MAMMO: HCPCS

## 2023-09-13 PROCEDURE — 93010 ELECTROCARDIOGRAM REPORT: CPT | Performed by: INTERNAL MEDICINE

## 2023-09-13 PROCEDURE — 77066 DX MAMMO INCL CAD BI: CPT

## 2023-09-18 ENCOUNTER — TELEPHONE (OUTPATIENT)
Dept: OBGYN CLINIC | Facility: CLINIC | Age: 48
End: 2023-09-18

## 2023-09-18 NOTE — TELEPHONE ENCOUNTER
----- Message from Megha Cedeno sent at 9/17/2023  9:27 AM EDT -----  Regarding: ADRIANA  Contact: [phone]  Good morning- thank you! Can the prescriptions be sent over to the [facility] on [address] before the surgery so I can pick them up before? Thank you!

## 2023-09-20 ENCOUNTER — TELEPHONE (OUTPATIENT)
Dept: OBGYN CLINIC | Facility: CLINIC | Age: 48
End: 2023-09-20

## 2023-09-20 NOTE — PRE-PROCEDURE INSTRUCTIONS
Pre-Surgery Instructions:   Medication Instructions   • albuterol (ProAir HFA) 90 mcg/act inhaler Uses PRN- OK to take day of surgery   • calcium carbonate (OS-MELISSA) 1250 (500 Ca) MG tablet Stop taking 7 days prior to surgery. • ergocalciferol (VITAMIN D2) 50,000 units Weekly   • ibuprofen (MOTRIN) 600 mg tablet Stop taking 7 days prior to surgery. • psyllium (METAMUCIL) 0.52 g capsule Hold day of surgery. • Red Yeast Rice 600 MG TABS Stop taking 7 days prior to surgery. • sertraline (ZOLOFT) 50 mg tablet Take night before surgery     Spoke with pt via phone. Medication instructions for day surgery reviewed. Please use only a sip of water to take your instructed medications. Avoid all over the counter vitamins, supplements and NSAIDS for one week prior to surgery per anesthesia guidelines. Tylenol is ok to take as needed. You will receive a call one business day prior to surgery with an arrival time and hospital directions. If your surgery is scheduled on a Monday, the hospital will be calling you on the Friday prior to your surgery. If you have not heard from anyone by 8pm, please call the hospital supervisor through the hospital  at [phone]. Bebe Sarabia [phone]). Do not eat or drink anything after midnight the night before your surgery, including candy, mints, lifesavers, or chewing gum. Do not drink alcohol 24hrs before your surgery. Try not to smoke at least 24hrs before your surgery. Three carb drinks given. First one night before surgery an hour after dinner, second one an hour before bed and timing of the third one will be given during surgery arrival time phone call. Follow the pre surgery showering instructions as listed in the [facility] Surgical Experience Booklet” or otherwise provided by your surgeon's office. Do not shave the surgical area 24 hours before surgery.  Do not apply any lotions, creams, including makeup, cologne, deodorant, or perfumes after showering on the day of your surgery. No contact lenses, eye make-up, or artificial eyelashes. Remove nail polish, including gel polish, and any artificial, gel, or acrylic nails if possible. Remove all jewelry including rings and body piercing jewelry. Wear causal clothing that is easy to take on and off. Consider your type of surgery. Keep any valuables, jewelry, piercings at home. Please bring any specially ordered equipment (sling, braces) if indicated. Arrange for a responsible person to drive you to and from the hospital on the day of your surgery. Visitor Guidelines discussed. Call the surgeon's office with any new illnesses, exposures, or additional questions prior to surgery. Please reference your [facility] Surgical Experience Booklet” for additional information to prepare for your upcoming surgery.

## 2023-09-27 ENCOUNTER — TELEPHONE (OUTPATIENT)
Dept: OBGYN CLINIC | Facility: CLINIC | Age: 48
End: 2023-09-27

## 2023-09-27 PROCEDURE — NC001 PR NO CHARGE: Performed by: STUDENT IN AN ORGANIZED HEALTH CARE EDUCATION/TRAINING PROGRAM

## 2023-09-27 NOTE — TELEPHONE ENCOUNTER
----- Message from Danny Cedeno sent at 9/27/2023  3:10 PM EDT -----  Regarding: re medication  Contact: [phone]  Corky pereira. Does the dr recommend a binder for after surgery? Some of my friends said they were given a binder/girtle for post surgery recovery.

## 2023-09-27 NOTE — TELEPHONE ENCOUNTER
Sure, can't hurt.  Generally not necessary after laparoscopic surgery, but if she wants to use a DME website to fax us an order we can sign off on that

## 2023-09-27 NOTE — H&P
H&P Exam - Gynecology   Albertine Libman Fenstermacher 50 y.o. female MRN: [id number]  Unit/Bed#:  Encounter: [id number]    Assessment/Plan   48yo with heavy menstrual bleeding, for TLH/BSO    - void on call to OR  - for 5000 U heparin prior to skin incision  - tylenol/celebrex in holding area  - plan outpatient procedure     History of Present Illness     HPI:  Og Andrade is a 50 y.o. female who presents for management of heavy menstrual bleeding. Started " 2 years ago with gradually worsening course since that time. Bleeding is characterized as heavy. Length of bleeding: 3 days. Associated symptoms include diarrhea, headache and nausea. Evaluation to date: pelvic ultrasound: negative except for thickened endometrium (on cycle).  Treatment to date: none; was on Depoprovera until 2 years ago"    Review of Systems as above    Historical Information   Past Medical History:   Diagnosis Date   • Asthma 01/1993   • Complex partial epilepsy ([address])     Seisure free since age 25    • Epilepsy ([address])    • Factor 5 Leiden mutation, heterozygous ([address])    • Fracture, patella    • Lupus ([address]) 3/29/2002   • Pregnancy     Resulted in 1 Living child    • TIA (transient ischemic attack) 1/1/93     Past Surgical History:   Procedure Laterality Date   • BACK SURGERY     • BREAST EXCISIONAL BIOPSY Left 02/14/2004    benign   • MAMMO STEREOTACTIC BREAST BIOPSY LEFT (ALL INC) Left 3/8/2023   • NASAL SEPTUM SURGERY      Deviation Repair    • RHINOPLASTY       OB/GYN History:   Family History   Problem Relation Age of Onset   • Liver cancer Mother         age dx unknown   • Colon cancer Mother 76   • Cancer Mother         Liver and lung cancer   • Heart attack Father    • Hypertension Father    • Heart disease Father    • No Known Problems Maternal Grandmother    • No Known Problems Maternal Grandfather    • Breast cancer Paternal Grandmother         age dx unknown   • Colon cancer Paternal Grandmother         age dx unknown   • Stomach cancer Paternal Grandfather         age dx unknown   • Multiple myeloma Brother    • No Known Problems Brother    • No Known Problems Brother    • Breast cancer Maternal Aunt         age dx unknown   • No Known Problems Maternal Aunt    • No Known Problems Maternal Aunt    • No Known Problems Paternal Aunt    • No Known Problems Son    • Ovarian cancer Neg Hx    • Uterine cancer Neg Hx    • Cervical cancer Neg Hx      Social History   Social History     Substance and Sexual Activity   Alcohol Use Yes   • Alcohol/week: 7.0 standard drinks of alcohol   • Types: 5 Glasses of wine, 2 Shots of liquor per week    Comment: glass wine/day     Social History     Substance and Sexual Activity   Drug Use Never     Social History     Tobacco Use   Smoking Status Never   Smokeless Tobacco Never     E-Cigarette/Vaping   • E-Cigarette Use Never User      E-Cigarette/Vaping Substances   • Nicotine No    • THC No    • CBD No    • Flavoring No    • Other No    • Unknown No        Meds/Allergies   all current active meds have been reviewed  Allergies   Allergen Reactions   • Carbamazepine Hives   • Cephalexin Hives   • Flurbiprofen Fatigue   • Phenytoin Hives       Objective   Vitals: Last menstrual period 08/21/2023, not currently breastfeeding. No intake or output data in the 24 hours ending 09/27/23 1318       Physical Exam  "Constitutional:       Appearance: She is well-developed. HENT:      Head: Normocephalic. Cardiovascular:      Rate and Rhythm: Normal rate. Pulmonary:      Effort: Pulmonary effort is normal.   Abdominal:      General: There is no distension. Palpations: Abdomen is soft. Tenderness: There is no abdominal tenderness. There is no guarding or rebound. Genitourinary:     General: Normal vulva. Exam position: Lithotomy position. Vagina: No bleeding. Cervix: No cervical motion tenderness.       Comments: No descensus, narrow vaginal canal  Musculoskeletal:         General: Normal range of motion. Cervical back: Normal range of motion. Skin:     General: Skin is warm and dry. Neurological:      Mental Status: She is alert and oriented to person, place, and time.    Psychiatric:         Behavior: Behavior normal."

## 2023-09-28 ENCOUNTER — ANESTHESIA EVENT (OUTPATIENT)
Dept: PERIOP | Facility: HOSPITAL | Age: 48
End: 2023-09-28
Payer: COMMERCIAL

## 2023-09-29 ENCOUNTER — ANESTHESIA EVENT (OUTPATIENT)
Dept: ANESTHESIOLOGY | Facility: HOSPITAL | Age: 48
End: 2023-09-29

## 2023-09-29 ENCOUNTER — HOSPITAL ENCOUNTER (OUTPATIENT)
Facility: HOSPITAL | Age: 48
Setting detail: OUTPATIENT SURGERY
Discharge: HOME | End: 2023-09-29
Attending: STUDENT IN AN ORGANIZED HEALTH CARE EDUCATION/TRAINING PROGRAM | Admitting: STUDENT IN AN ORGANIZED HEALTH CARE EDUCATION/TRAINING PROGRAM
Payer: COMMERCIAL

## 2023-09-29 ENCOUNTER — ANESTHESIA (OUTPATIENT)
Dept: PERIOP | Facility: HOSPITAL | Age: 48
End: 2023-09-29
Payer: COMMERCIAL

## 2023-09-29 ENCOUNTER — ANESTHESIA (OUTPATIENT)
Dept: ANESTHESIOLOGY | Facility: HOSPITAL | Age: 48
End: 2023-09-29

## 2023-09-29 VITALS
SYSTOLIC BLOOD PRESSURE: 147 MMHG | HEIGHT: 59 IN | HEART RATE: 86 BPM | TEMPERATURE: 97.3 F | BODY MASS INDEX: 43.34 KG/M2 | DIASTOLIC BLOOD PRESSURE: 70 MMHG | WEIGHT: 215 LBS | OXYGEN SATURATION: 98 % | RESPIRATION RATE: 17 BRPM

## 2023-09-29 DIAGNOSIS — G89.18 POSTOPERATIVE PAIN: Primary | ICD-10-CM

## 2023-09-29 DIAGNOSIS — S29.019A THORACIC MYOFASCIAL STRAIN, INITIAL ENCOUNTER: ICD-10-CM

## 2023-09-29 DIAGNOSIS — R10.2 PELVIC PAIN IN FEMALE: ICD-10-CM

## 2023-09-29 DIAGNOSIS — N92.0 MENORRHAGIA WITH REGULAR CYCLE: ICD-10-CM

## 2023-09-29 PROBLEM — Z90.710 S/P TOTAL HYSTERECTOMY AND BSO (BILATERAL SALPINGO-OOPHORECTOMY): Status: ACTIVE | Noted: 2023-09-29

## 2023-09-29 PROBLEM — Z90.79 S/P TOTAL HYSTERECTOMY AND BSO (BILATERAL SALPINGO-OOPHORECTOMY): Status: ACTIVE | Noted: 2023-09-29

## 2023-09-29 PROBLEM — Z90.722 S/P TOTAL HYSTERECTOMY AND BSO (BILATERAL SALPINGO-OOPHORECTOMY): Status: ACTIVE | Noted: 2023-09-29

## 2023-09-29 LAB
EXT PREGNANCY TEST URINE: NEGATIVE
EXT. CONTROL: NORMAL
GLUCOSE SERPL-MCNC: 132 MG/DL (ref 65–140)

## 2023-09-29 PROCEDURE — 81025 URINE PREGNANCY TEST: CPT | Performed by: STUDENT IN AN ORGANIZED HEALTH CARE EDUCATION/TRAINING PROGRAM

## 2023-09-29 PROCEDURE — 88342 IMHCHEM/IMCYTCHM 1ST ANTB: CPT | Performed by: PATHOLOGY

## 2023-09-29 PROCEDURE — 82948 REAGENT STRIP/BLOOD GLUCOSE: CPT

## 2023-09-29 PROCEDURE — 58571 TLH W/T/O 250 G OR LESS: CPT | Performed by: STUDENT IN AN ORGANIZED HEALTH CARE EDUCATION/TRAINING PROGRAM

## 2023-09-29 PROCEDURE — 88307 TISSUE EXAM BY PATHOLOGIST: CPT | Performed by: PATHOLOGY

## 2023-09-29 RX ORDER — SODIUM CHLORIDE 9 MG/ML
INJECTION, SOLUTION INTRAVENOUS CONTINUOUS PRN
Status: DISCONTINUED | OUTPATIENT
Start: 2023-09-29 | End: 2023-09-29

## 2023-09-29 RX ORDER — OXYCODONE HYDROCHLORIDE 5 MG/1
5 TABLET ORAL EVERY 6 HOURS PRN
Qty: 5 TABLET | Refills: 0 | Status: SHIPPED | OUTPATIENT
Start: 2023-09-29

## 2023-09-29 RX ORDER — FENTANYL CITRATE 50 UG/ML
INJECTION, SOLUTION INTRAMUSCULAR; INTRAVENOUS AS NEEDED
Status: DISCONTINUED | OUTPATIENT
Start: 2023-09-29 | End: 2023-09-29

## 2023-09-29 RX ORDER — CEFAZOLIN SODIUM 2 G/50ML
2000 SOLUTION INTRAVENOUS ONCE
Status: CANCELLED | OUTPATIENT
Start: 2023-09-29 | End: 2023-09-29

## 2023-09-29 RX ORDER — METRONIDAZOLE 500 MG/100ML
500 INJECTION, SOLUTION INTRAVENOUS ONCE
Status: COMPLETED | OUTPATIENT
Start: 2023-09-29 | End: 2023-09-29

## 2023-09-29 RX ORDER — ONDANSETRON 2 MG/ML
INJECTION INTRAMUSCULAR; INTRAVENOUS AS NEEDED
Status: DISCONTINUED | OUTPATIENT
Start: 2023-09-29 | End: 2023-09-29

## 2023-09-29 RX ORDER — LIDOCAINE HYDROCHLORIDE 10 MG/ML
INJECTION, SOLUTION EPIDURAL; INFILTRATION; INTRACAUDAL; PERINEURAL AS NEEDED
Status: DISCONTINUED | OUTPATIENT
Start: 2023-09-29 | End: 2023-09-29

## 2023-09-29 RX ORDER — ACETAMINOPHEN 500 MG
1000 TABLET ORAL EVERY 6 HOURS PRN
COMMUNITY

## 2023-09-29 RX ORDER — SODIUM CHLORIDE, SODIUM LACTATE, POTASSIUM CHLORIDE, CALCIUM CHLORIDE 600; 310; 30; 20 MG/100ML; MG/100ML; MG/100ML; MG/100ML
INJECTION, SOLUTION INTRAVENOUS CONTINUOUS PRN
Status: DISCONTINUED | OUTPATIENT
Start: 2023-09-29 | End: 2023-09-29

## 2023-09-29 RX ORDER — KETOROLAC TROMETHAMINE 30 MG/ML
15 INJECTION, SOLUTION INTRAMUSCULAR; INTRAVENOUS ONCE AS NEEDED
Status: COMPLETED | OUTPATIENT
Start: 2023-09-29 | End: 2023-09-29

## 2023-09-29 RX ORDER — ACETAMINOPHEN 325 MG/1
1000 TABLET ORAL ONCE
Status: COMPLETED | OUTPATIENT
Start: 2023-09-29 | End: 2023-09-29

## 2023-09-29 RX ORDER — IBUPROFEN 800 MG/1
800 TABLET ORAL EVERY 8 HOURS PRN
Qty: 30 TABLET | Refills: 0 | Status: SHIPPED | OUTPATIENT
Start: 2023-09-29

## 2023-09-29 RX ORDER — OXYCODONE HYDROCHLORIDE 5 MG/1
10 TABLET ORAL EVERY 4 HOURS PRN
Status: DISCONTINUED | OUTPATIENT
Start: 2023-09-29 | End: 2023-09-29 | Stop reason: HOSPADM

## 2023-09-29 RX ORDER — BUPIVACAINE HYDROCHLORIDE 2.5 MG/ML
INJECTION, SOLUTION EPIDURAL; INFILTRATION; INTRACAUDAL AS NEEDED
Status: DISCONTINUED | OUTPATIENT
Start: 2023-09-29 | End: 2023-09-29 | Stop reason: HOSPADM

## 2023-09-29 RX ORDER — MIDAZOLAM HYDROCHLORIDE 2 MG/2ML
INJECTION, SOLUTION INTRAMUSCULAR; INTRAVENOUS AS NEEDED
Status: DISCONTINUED | OUTPATIENT
Start: 2023-09-29 | End: 2023-09-29

## 2023-09-29 RX ORDER — HEPARIN SODIUM 5000 [USP'U]/ML
INJECTION, SOLUTION INTRAVENOUS; SUBCUTANEOUS AS NEEDED
Status: DISCONTINUED | OUTPATIENT
Start: 2023-09-29 | End: 2023-09-29

## 2023-09-29 RX ORDER — CEFAZOLIN SODIUM 1 G/3ML
INJECTION, POWDER, FOR SOLUTION INTRAMUSCULAR; INTRAVENOUS AS NEEDED
Status: DISCONTINUED | OUTPATIENT
Start: 2023-09-29 | End: 2023-09-29

## 2023-09-29 RX ORDER — PROMETHAZINE HYDROCHLORIDE 25 MG/ML
25 INJECTION, SOLUTION INTRAMUSCULAR; INTRAVENOUS ONCE AS NEEDED
Status: COMPLETED | OUTPATIENT
Start: 2023-09-29 | End: 2023-09-29

## 2023-09-29 RX ORDER — METOCLOPRAMIDE HYDROCHLORIDE 5 MG/ML
INJECTION INTRAMUSCULAR; INTRAVENOUS AS NEEDED
Status: DISCONTINUED | OUTPATIENT
Start: 2023-09-29 | End: 2023-09-29

## 2023-09-29 RX ORDER — ACETAMINOPHEN 325 MG/1
650 TABLET ORAL EVERY 6 HOURS PRN
Status: DISCONTINUED | OUTPATIENT
Start: 2023-09-29 | End: 2023-09-29 | Stop reason: HOSPADM

## 2023-09-29 RX ORDER — ROCURONIUM BROMIDE 10 MG/ML
INJECTION, SOLUTION INTRAVENOUS AS NEEDED
Status: DISCONTINUED | OUTPATIENT
Start: 2023-09-29 | End: 2023-09-29

## 2023-09-29 RX ORDER — PROPOFOL 10 MG/ML
INJECTION, EMULSION INTRAVENOUS AS NEEDED
Status: DISCONTINUED | OUTPATIENT
Start: 2023-09-29 | End: 2023-09-29

## 2023-09-29 RX ORDER — FENTANYL CITRATE/PF 50 MCG/ML
25 SYRINGE (ML) INJECTION
Status: DISCONTINUED | OUTPATIENT
Start: 2023-09-29 | End: 2023-09-29 | Stop reason: HOSPADM

## 2023-09-29 RX ORDER — METRONIDAZOLE 500 MG/100ML
500 INJECTION, SOLUTION INTRAVENOUS ONCE
Status: CANCELLED | OUTPATIENT
Start: 2023-09-29

## 2023-09-29 RX ORDER — ONDANSETRON 2 MG/ML
4 INJECTION INTRAMUSCULAR; INTRAVENOUS ONCE AS NEEDED
Status: COMPLETED | OUTPATIENT
Start: 2023-09-29 | End: 2023-09-29

## 2023-09-29 RX ORDER — CEFAZOLIN SODIUM 2 G/50ML
2000 SOLUTION INTRAVENOUS ONCE
Status: DISCONTINUED | OUTPATIENT
Start: 2023-09-29 | End: 2023-09-29 | Stop reason: HOSPADM

## 2023-09-29 RX ORDER — CELECOXIB 200 MG/1
200 CAPSULE ORAL ONCE
Status: COMPLETED | OUTPATIENT
Start: 2023-09-29 | End: 2023-09-29

## 2023-09-29 RX ORDER — SODIUM CHLORIDE, SODIUM LACTATE, POTASSIUM CHLORIDE, CALCIUM CHLORIDE 600; 310; 30; 20 MG/100ML; MG/100ML; MG/100ML; MG/100ML
20 INJECTION, SOLUTION INTRAVENOUS CONTINUOUS
Status: DISCONTINUED | OUTPATIENT
Start: 2023-09-29 | End: 2023-09-29 | Stop reason: HOSPADM

## 2023-09-29 RX ORDER — HYDROMORPHONE HCL IN WATER/PF 6 MG/30 ML
0.2 PATIENT CONTROLLED ANALGESIA SYRINGE INTRAVENOUS
Status: DISCONTINUED | OUTPATIENT
Start: 2023-09-29 | End: 2023-09-29 | Stop reason: HOSPADM

## 2023-09-29 RX ORDER — OXYCODONE HYDROCHLORIDE 5 MG/1
5 TABLET ORAL EVERY 4 HOURS PRN
Status: DISCONTINUED | OUTPATIENT
Start: 2023-09-29 | End: 2023-09-29 | Stop reason: HOSPADM

## 2023-09-29 RX ADMIN — SODIUM CHLORIDE, SODIUM LACTATE, POTASSIUM CHLORIDE, AND CALCIUM CHLORIDE: .6; .31; .03; .02 INJECTION, SOLUTION INTRAVENOUS at 07:24

## 2023-09-29 RX ADMIN — ONDANSETRON 4 MG: 2 INJECTION INTRAMUSCULAR; INTRAVENOUS at 10:22

## 2023-09-29 RX ADMIN — SUGAMMADEX 195 MG: 100 INJECTION, SOLUTION INTRAVENOUS at 09:55

## 2023-09-29 RX ADMIN — METOCLOPRAMIDE HYDROCHLORIDE 10 MG: 5 INJECTION INTRAMUSCULAR; INTRAVENOUS at 09:47

## 2023-09-29 RX ADMIN — KETOROLAC TROMETHAMINE 15 MG: 30 INJECTION, SOLUTION INTRAMUSCULAR; INTRAVENOUS at 10:48

## 2023-09-29 RX ADMIN — FENTANYL CITRATE 50 MCG: 50 INJECTION, SOLUTION INTRAMUSCULAR; INTRAVENOUS at 08:06

## 2023-09-29 RX ADMIN — CEFAZOLIN 2000 MG: 1 INJECTION, POWDER, FOR SOLUTION INTRAMUSCULAR; INTRAVENOUS at 08:15

## 2023-09-29 RX ADMIN — LIDOCAINE HYDROCHLORIDE 50 MG: 10 INJECTION, SOLUTION EPIDURAL; INFILTRATION; INTRACAUDAL; PERINEURAL at 08:06

## 2023-09-29 RX ADMIN — ACETAMINOPHEN 975 MG: 325 TABLET, FILM COATED ORAL at 06:19

## 2023-09-29 RX ADMIN — FENTANYL CITRATE 25 MCG: 50 INJECTION, SOLUTION INTRAMUSCULAR; INTRAVENOUS at 09:46

## 2023-09-29 RX ADMIN — FENTANYL CITRATE 50 MCG: 50 INJECTION, SOLUTION INTRAMUSCULAR; INTRAVENOUS at 08:21

## 2023-09-29 RX ADMIN — HEPARIN SODIUM 5000 UNITS: 5000 INJECTION INTRAVENOUS; SUBCUTANEOUS at 08:29

## 2023-09-29 RX ADMIN — SODIUM CHLORIDE: 9 INJECTION, SOLUTION INTRAVENOUS at 08:23

## 2023-09-29 RX ADMIN — CELECOXIB 200 MG: 200 CAPSULE ORAL at 06:19

## 2023-09-29 RX ADMIN — ROCURONIUM BROMIDE 20 MG: 10 INJECTION, SOLUTION INTRAVENOUS at 08:58

## 2023-09-29 RX ADMIN — ROCURONIUM BROMIDE 50 MG: 10 INJECTION, SOLUTION INTRAVENOUS at 08:06

## 2023-09-29 RX ADMIN — FENTANYL CITRATE 25 MCG: 50 INJECTION INTRAMUSCULAR; INTRAVENOUS at 10:43

## 2023-09-29 RX ADMIN — PROPOFOL 200 MG: 10 INJECTION, EMULSION INTRAVENOUS at 08:06

## 2023-09-29 RX ADMIN — PHENYLEPHRINE HYDROCHLORIDE 40 MCG/MIN: 10 INJECTION INTRAVENOUS at 08:40

## 2023-09-29 RX ADMIN — METRONIDAZOLE: 500 SOLUTION INTRAVENOUS at 08:20

## 2023-09-29 RX ADMIN — MIDAZOLAM 2 MG: 1 INJECTION INTRAMUSCULAR; INTRAVENOUS at 07:58

## 2023-09-29 RX ADMIN — PROMETHAZINE HYDROCHLORIDE 25 MG: 25 INJECTION INTRAMUSCULAR; INTRAVENOUS at 10:43

## 2023-09-29 RX ADMIN — ONDANSETRON 4 MG: 2 INJECTION INTRAMUSCULAR; INTRAVENOUS at 09:46

## 2023-09-29 NOTE — OP NOTE
OPERATIVE REPORT  PATIENT NAME: Yani Cedeno    :  1975  MRN: [id number]  Pt Location: [facility] OR ROOM 07    SURGERY DATE: 2023    Surgeon(s) and Role:     * Nolan Dooley MD - Primary     * Lesia Sorensen MD - Assisting     * Marybeth Acevedo DO - Assisting    Preop Diagnosis:  Pelvic pain in female [R10.2]  Menorrhagia with regular cycle [N92.0]    Post-Op Diagnosis Codes:     * Pelvic pain in female [R10.2]     * Menorrhagia with regular cycle [N92.0]    Procedure(s):  Bilateral - HYSTERECTOMY LAPAROSCOPIC TOTAL  WITH BILATERAL SALPINGO-OOPHERECTOMY  CYSTOSCOPY  LYSIS OF ADHESIONS    Specimen(s):  ID Type Source Tests Collected by Time Destination   1 : uterus, cervix, bilateral tubes and ovaries  Tissue Uterus w/Bilateral Ovaries and Fallopian Tubes TISSUE EXAM Nolan Dooley MD 2023 [id number]        Estimated Blood Loss: 25 mL    Drains:  Urethral Catheter 16 Fr. (Active)   Number of days: 0       Anesthesia Type:   General    Operative Indications:  Pelvic pain in female [R10.2]  Menorrhagia with regular cycle [N92.0]    Operative Findings:  NEFG, multiparous, but small cervix. Uterus sounded to 11 cm  Normal uterus, with adhesions between posterior uterus and rectosigmoid. Filmy adhesions between left adnexal tissue and omentum. Normal bilateral fallopian tubes. Left ovarian cyst, normal right ovarian cyst.   Bladder intact with brisk jets from bilateral ureteral orifices. Complications:   None    Procedure and Technique:  Brief History     Risks, benefits, and alternatives to the procedure were discussed with the patient and she had the opportunity to ask questions. Informed consent was previously obtained, but reviewed. Description of Procedure     Patient was taken to the operating room where a time out was performed to confirm correct patient and correct procedure. 2 grams of Ancef and 500 mg flagyl were given for infection prophylaxis.  General endotracheal anesthesia (GET) was administered and the patient was positioned on the OR table in the dorsal lithotomy position. All pressure points were padded and a Mariaa hugger was placed to maintain control of core body temperature. A bimanual exam was performed, with the above findings, notably an enlarged and globular uterus with no palpable adnexal masses or fullness. The patient was prepped and draped in the usual sterile fashion with chloroprep on the abdomen, chlorhexidine for the vagina and perineum. A ashton catheter was placed to drainage. Operative Technique     Speculum was placed in the vagina and the anterior lip of the cervix was grasped with an Allis clamp for traction. The uterus was sounded to 11cm. The cervix was then sequentially dilated to accommodate a Jannie uterine manipulator which was placed without difficulty. All other instruments were removed from the vagina. Attention was then turned to the abdomen for laparoscopy. Kelsey clamp was used to invert the umbilicus for accessibility. The base of the umbilicus was grasped and everted with penetrating towel clamps. The veres needle was introduced, under low flow, with entry into the abdominal cavity confirmed with a drop in pressure. The abdomen was insufflated to 15 mmHg, after which a 10 mm incision was made at the base of the umbilicus. A 10mm trochar was introduced under direct visualization and without difficulty. The area underlying the Veres entry was examined, with no damage to the bowel or overlying omentum noted. Subsequently, the entire abdomen and pelvis was inspected and there was no evidence of injury to bowel, bladder, vasculature, or other structures. The patient was placed in Trendelenburg position and attention was then turned to the pelvis. The uterus was elevated to visualize the fallopian tubes and ovaries, as well as the cul de sac. Findings were as noted above.  Notably, anatomy was normal with small left ovarian cyst and posterior adhesions between uterus and rectosigmoid. Additional 5mm trochars was introduced in the right and left lower abdomen approximately 2cm superior and medial to the iliac crests, under direct visualization, after the injection of marcaine. An additional suprapubic port was subsequently placed in the same manner. The left fallopian tube was grasped at its fimbriated end with a blunt grasper and elevated to visualize the mesosalpinx. The Harmonic device was used to ligate along the mesosalpinx, working proximally and taking care to avoid ovarian vasculature. The round ligament was identified on the left side and transected with the Harmonic scalpel. With the uterus retracted to the opposite side, the anterior leaf of the broad ligament was dissected and the bladder flap developed off the lower uterine segment. The posterior leaf of the broad ligament was divided and the IP ligament skeletonized. Subsequently, the right IP ligament was identified and transected with the Harmonic scalpel. The same was performed on the right side. The right uterine vessel was skeletonized and coagulated with the 21GRAMS bipolar device. The same was performed on the left side. The uterus was noted to be appropriately blanched, so the Harmonic scalpel was then used to coagulate and transect the uterine arteries bilaterally. The edge of the uterine manipulator was identified, and the monopolar spatula was used to incise circumferentially along the cervicovaginal junction to separate the uterus and cervix from the vagina. The specimen was removed, passed off, and submitted to pathology for analysis. Balloon was used to maintain insufflation. The vaginal cuff was closed laparoscopically with 2-0 Stratafix in a continuous running fashion. The pelvis was suction-irrigated with excellent hemostasis noted.  Laparoscopic inspection of the vaginal cuff and bilateral pedicles under low pressure also revealed excellent hemostasis. Balloon deflated and vaginal exam revealed cuff was intact and without defects. The patient was taken out of Trendelenberg. All instruments were removed from abdomen, and the pneumoperitoneum was released before removing the ports. The ashton catheter was removed and cystoscopy was performed. Survey revealed an intact bladder without any defects or stitches. Bilateral ureteral jets noted. The bladder was drained. The skin incisions were closed with 4-0 monocryl in an interrupted fashion. Surgical glue was applied to the skin incisions. All counts were correct times two. The patient tolerated the procedure well, and was transferred to the recovery room in stable condition. I was present for the entire procedure.     Patient Disposition:  PACU  and extubated and stable        SIGNATURE: Lionel Ruiz MD  DATE: September 29, 2023  TIME: 10:01 AM

## 2023-09-29 NOTE — INTERVAL H&P NOTE
H&P reviewed. After examining the patient I find no changes in the patients condition since the H&P had been written.     Vitals:    09/29/23 0549   BP: 157/84   Pulse: 100   Resp: 20   Temp: 98.1 °F (36.7 °C)   SpO2: 98%

## 2023-09-29 NOTE — ANESTHESIA PREPROCEDURE EVALUATION
Procedure:  HYSTERECTOMY LAPAROSCOPIC TOTAL  WITH BILATERAL SALPINGO-OOPHERECTOMY (Bilateral: Abdomen)  CYSTOSCOPY (Bladder)    49 yo F with PMHx of Factor V Leiden (stopped AC 6 years ago) with TIA/stroke during pregnancy (residual minimal LLE weakness), epilepsy as a child (last seizure at 25, off medications), mild intermittent asthma with PRN albuterol use months ago. NPO verified. Relevant Problems   ANESTHESIA (within normal limits)      CARDIO (within normal limits)   (+) Classic migraine with aura      ENDO (within normal limits)      GI/HEPATIC (within normal limits)      /RENAL (within normal limits)      MUSCULOSKELETAL (within normal limits)      NEURO/PSYCH   (+) Classic migraine with aura      PULMONARY   (+) Asthma      Hematopoietic and Hemostatic   (+) Factor V Leiden mutation ([address])      Other   (+) Morbid obesity ([address])      TTE (2015): SUMMARY   LEFT VENTRICLE:   Systolic function was normal. Ejection fraction was estimated to be 60 %. There were no regional wall motion abnormalities. Left ventricular diastolic function parameters were normal.   HISTORY: PRIOR HISTORY: Morbid obesity with a BMI of 42.8. PROCEDURE: The study was performed in the [address] [place] [address]. This was a   routine study. The transthoracic approach was used. The study included    complete   2D imaging, M-mode, complete spectral Doppler, and color Doppler. Images were   obtained from the parasternal, apical, subcostal, and suprasternal notch   acoustic windows. Image quality was adequate. LEFT VENTRICLE: Size was normal. Systolic function was normal. Ejection   fraction was estimated to be 60 %. There were no regional wall motion   abnormalities. Wall thickness was normal. DOPPLER: The ratio of early   ventricular filling to atrial contraction velocities was within the normal   range.  Left ventricular diastolic function parameters were normal.   RIGHT VENTRICLE: The size was normal. Systolic function was normal. LEFT ATRIUM: Size was normal.     Physical Exam    Airway    Mallampati score: III  TM Distance: >3 FB  Neck ROM: full     Dental       Cardiovascular  Rhythm: regular, Rate: normal,     Pulmonary  Breath sounds clear to auscultation,     Other Findings        Anesthesia Plan  ASA Score- 3     Anesthesia Type- general with ASA Monitors. Additional Monitors:   Airway Plan: ETT. Plan Factors-Exercise tolerance (METS): >4 METS. Chart reviewed. EKG reviewed. Imaging results reviewed. Existing labs reviewed. Patient summary reviewed. Patient is not a current smoker. Obstructive sleep apnea risk education given perioperatively. Induction- intravenous. Postoperative Plan- Plan for postoperative opioid use. Planned trial extubation    Informed Consent- Anesthetic plan and risks discussed with patient. I personally reviewed this patient with the CRNA. Discussed and agreed on the Anesthesia Plan with the CRNA. Hebert Bustillos

## 2023-09-29 NOTE — ANESTHESIA POSTPROCEDURE EVALUATION
Post-Op Assessment Note    CV Status:  Stable    Pain management: adequate     Mental Status:  Alert and awake   Hydration Status:  Euvolemic   PONV Controlled:  Controlled   Airway Patency:  Patent      Post Op Vitals Reviewed: Yes      Staff: CRNA         No notable events documented.     /66 (09/29/23 1015)    Temp (!) 97.4 °F (36.3 °C) (09/29/23 1015)    Pulse 84 (09/29/23 1015)   Resp   18   SpO2 100 % (09/29/23 1015)

## 2023-10-02 ENCOUNTER — TELEPHONE (OUTPATIENT)
Dept: OBGYN CLINIC | Facility: CLINIC | Age: 48
End: 2023-10-02

## 2023-10-02 NOTE — TELEPHONE ENCOUNTER
Postop call. Feeling very well, pain managed with tylenol/ibuprofen only. Ambulating, minimal bleeding.

## 2023-10-03 ENCOUNTER — PATIENT MESSAGE (OUTPATIENT)
Dept: OBGYN CLINIC | Facility: MEDICAL CENTER | Age: 48
End: 2023-10-03

## 2023-10-04 ENCOUNTER — TELEPHONE (OUTPATIENT)
Dept: OBGYN CLINIC | Facility: CLINIC | Age: 48
End: 2023-10-04

## 2023-10-04 NOTE — TELEPHONE ENCOUNTER
Called and reviewed pathology in detail.  Given focal nature, simple hyperplasia without atypia, no further eval necessary

## 2023-10-16 DIAGNOSIS — S29.019A THORACIC MYOFASCIAL STRAIN, INITIAL ENCOUNTER: ICD-10-CM

## 2023-10-16 RX ORDER — IBUPROFEN 600 MG/1
TABLET ORAL
Qty: 90 TABLET | Refills: 1 | Status: SHIPPED | OUTPATIENT
Start: 2023-10-16

## 2023-10-19 ENCOUNTER — TELEPHONE (OUTPATIENT)
Dept: OBGYN CLINIC | Facility: CLINIC | Age: 48
End: 2023-10-19

## 2023-10-19 NOTE — TELEPHONE ENCOUNTER
----- Message from Daar Cedeno sent at 10/19/2023  6:19 AM EDT -----  Regarding: re medication  Contact: [phone]  Good morning,    Could the doctor prescribe a couple pills of Tylenol with codene? I have the OxyContin (the complete prescription), but I’d rather not try it. I’ve had the Tylenol with codene and it seems to be effective. I’m having some pain in my abdomen and lower back in the afternoons. I’d say about a 7.5 out of 10. Thank you.

## 2023-10-23 ENCOUNTER — OFFICE VISIT (OUTPATIENT)
Dept: OBGYN CLINIC | Facility: CLINIC | Age: 48
End: 2023-10-23

## 2023-10-23 VITALS — WEIGHT: 211 LBS | SYSTOLIC BLOOD PRESSURE: 120 MMHG | BODY MASS INDEX: 42.62 KG/M2 | DIASTOLIC BLOOD PRESSURE: 84 MMHG

## 2023-10-23 DIAGNOSIS — Z90.79 S/P TOTAL HYSTERECTOMY AND BSO (BILATERAL SALPINGO-OOPHORECTOMY): Primary | ICD-10-CM

## 2023-10-23 DIAGNOSIS — Z90.710 S/P TOTAL HYSTERECTOMY AND BSO (BILATERAL SALPINGO-OOPHORECTOMY): Primary | ICD-10-CM

## 2023-10-23 DIAGNOSIS — Z90.722 S/P TOTAL HYSTERECTOMY AND BSO (BILATERAL SALPINGO-OOPHORECTOMY): Primary | ICD-10-CM

## 2023-10-23 PROBLEM — N92.0 MENORRHAGIA WITH REGULAR CYCLE: Status: RESOLVED | Noted: 2023-05-26 | Resolved: 2023-10-23

## 2023-10-23 PROCEDURE — 99024 POSTOP FOLLOW-UP VISIT: CPT | Performed by: STUDENT IN AN ORGANIZED HEALTH CARE EDUCATION/TRAINING PROGRAM

## 2023-10-23 NOTE — PROGRESS NOTES
Assessment/Plan:     Problem List Items Addressed This Visit          Other    S/P total hysterectomy and BSO (bilateral salpingo-oophorectomy) - Primary         Plan     1. Pathology and wound care reviewed  2. Dizziness: unlikely secondary to anesthesia this far out. Also very unlikely secondary to surgery given minimal blood loss. More likely that Trendelenburg positioning during surgery activated vertigo. She has meclizine at [facility] and I recommended she take this to see if this helps with her symptoms. May need further eval/treat for vertigo with PCP  3. Activity restrictions: can gradually increase activity and weight lifted. Nothing per vagina  4. Anticipated full return to work: 6wk postop  5. Follow up: 4 weeks for cuff check    Subjective:      Patient ID: Ruiz Mckeon is a 50 y.o. y.o. female. HPI She presents today for 2 week postoperative follow up. She is feeling well. She does have intermittent abdominal pain, particularly with long periods of sitting. Denies fevers, chills, nausea, vomiting, diarrhea, constipation. She has had dizziness since surgery, with one day of reprieve. Has a history of vertigo. The following portions of the patient's history were reviewed and updated as appropriate: allergies, current medications, past family history, past medical history, past social history, past surgical history and problem list.    Review of Systems  as above    Objective:  Vitals:    10/23/23 1313   BP: 120/84        Physical Exam   Constitutional: She is oriented to person, place, and time. She appears well-developed and well-nourished. HENT:   Head: Normocephalic. Eyes: EOM are normal.   Neck: Normal range of motion. Pulmonary/Chest: Effort normal.   Abdominal: Soft. She exhibits no distension and no mass. There is no tenderness. There is no rebound and no guarding. Incisions well-healed x4, glue and suture removed   Musculoskeletal: Normal range of motion.    Neurological: She is alert and oriented to person, place, and time. Skin: Skin is warm and dry. Psychiatric: She has a normal mood and affect. Her behavior is normal.   Vitals reviewed.

## 2023-10-26 ENCOUNTER — TELEPHONE (OUTPATIENT)
Dept: OBGYN CLINIC | Facility: CLINIC | Age: 48
End: 2023-10-26

## 2023-10-26 NOTE — TELEPHONE ENCOUNTER
[address] Raya talked to patient she was in for her 1st post op check with Dr. Adriel Mckinney, and when she was leaving the office they told her [employer]oeone would call her with her next post op appt to release her to go back to work. Would you be able to help me find an appoint for patient?   Thank you

## 2023-11-08 ENCOUNTER — OFFICE VISIT (OUTPATIENT)
Dept: FAMILY MEDICINE CLINIC | Facility: CLINIC | Age: 48
End: 2023-11-08
Payer: COMMERCIAL

## 2023-11-08 VITALS
DIASTOLIC BLOOD PRESSURE: 88 MMHG | TEMPERATURE: 96.9 F | HEIGHT: 59 IN | OXYGEN SATURATION: 96 % | WEIGHT: 214.6 LBS | HEART RATE: 85 BPM | SYSTOLIC BLOOD PRESSURE: 136 MMHG | BODY MASS INDEX: 43.26 KG/M2

## 2023-11-08 DIAGNOSIS — R73.9 HYPERGLYCEMIA: ICD-10-CM

## 2023-11-08 DIAGNOSIS — M19.90 ARTHRITIS: ICD-10-CM

## 2023-11-08 DIAGNOSIS — R10.2 PELVIC PAIN: ICD-10-CM

## 2023-11-08 DIAGNOSIS — Z00.00 ANNUAL PHYSICAL EXAM: Primary | ICD-10-CM

## 2023-11-08 DIAGNOSIS — E55.9 VITAMIN D DEFICIENCY: ICD-10-CM

## 2023-11-08 LAB
SL AMB  POCT GLUCOSE, UA: NORMAL
SL AMB LEUKOCYTE ESTERASE,UA: NORMAL
SL AMB POCT BILIRUBIN,UA: NORMAL
SL AMB POCT BLOOD,UA: NORMAL
SL AMB POCT CLARITY,UA: CLEAR
SL AMB POCT COLOR,UA: YELLOW
SL AMB POCT KETONES,UA: NORMAL
SL AMB POCT NITRITE,UA: NORMAL
SL AMB POCT PH,UA: 5.5
SL AMB POCT SPECIFIC GRAVITY,UA: 1.02
SL AMB POCT URINE PROTEIN: NORMAL
SL AMB POCT UROBILINOGEN: 0.2

## 2023-11-08 PROCEDURE — 81002 URINALYSIS NONAUTO W/O SCOPE: CPT | Performed by: FAMILY MEDICINE

## 2023-11-08 PROCEDURE — 99396 PREV VISIT EST AGE 40-64: CPT | Performed by: FAMILY MEDICINE

## 2023-11-08 RX ORDER — IBUPROFEN 600 MG/1
600 TABLET ORAL EVERY 6 HOURS PRN
Qty: 90 TABLET | Refills: 0 | Status: SHIPPED | OUTPATIENT
Start: 2023-11-08 | End: 2023-11-10

## 2023-11-08 NOTE — PROGRESS NOTES
[address]    NAME: Albertine Libman Fenstermacher  AGE: 50 y.o. SEX: female  : 1975     DATE: 2023     Assessment and Plan:     Problem List Items Addressed This Visit          Other    Hyperglycemia    Relevant Orders    Hemoglobin A1C    Vitamin D deficiency    Relevant Orders    Vitamin D 25 hydroxy     Other Visit Diagnoses       Annual physical exam    -  Primary    Relevant Orders    POCT urine dip (Completed)    Lipid panel    Pelvic pain        Arthritis        Relevant Medications    ibuprofen (MOTRIN) 600 mg tablet            Immunizations and preventive care screenings were discussed with patient today. Appropriate education was printed on patient's after visit summary. Counseling:  Exercise: the importance of regular exercise/physical activity was discussed. Recommend exercise 3-5 times per week for at least 30 minutes. Return in about 6 months (around 2024). Chief Complaint:     Chief Complaint   Patient presents with    Annual Exam      History of Present Illness:     Adult Annual Physical   Patient here for a comprehensive physical exam. The patient reports problems - recovering from her GLORIA/BSO, slowly getting back into her routine, needs to rest when symptoms . Using tylenol PM for sleep along with sertraline. Needs refill of ibuprofen for arthritis. Updated lab orders added to chart    Diet and Physical Activity  Diet/Nutrition: well balanced diet and consuming 3-5 servings of fruits/vegetables daily. Exercise: walking. Depression Screening  PHQ-2/9 Depression Screening           General Health  Sleep: sleeps well. Hearing: normal - bilateral.  Vision: goes for regular eye exams and wears glasses. Dental: regular dental visits. /GYN Health  Patient is: postmenopausal, GLORIA/BSO 23  Last menstrual period: 23  Contraceptive method:  post puma .     Advanced Care Planning  Do you have an advanced directive? no  Do you have a durable medical power of ? no     Review of Systems:     Review of Systems   Constitutional:  Negative for fatigue and fever. HENT:  Negative for congestion. Eyes:  Negative for visual disturbance. Respiratory:  Negative for chest tightness and shortness of breath. Cardiovascular:  Negative for chest pain and palpitations. Gastrointestinal:  Negative for abdominal pain. Genitourinary:  Negative for decreased urine volume, difficulty urinating, dysuria, frequency, menstrual problem, vaginal bleeding and vaginal pain. Musculoskeletal:  Negative for arthralgias. Neurological:  Negative for headaches. Hematological:  Does not bruise/bleed easily.       Past Medical History:     Past Medical History:   Diagnosis Date    Asthma 01/1993    Complex partial epilepsy ([address])     Seisure free since age 25     Diverticulitis of colon 11/92    Epilepsy ([address])     Factor 5 Leiden mutation, heterozygous ([address])     Fracture, patella     Lupus ([address]) 3/29/2002    Pregnancy     Resulted in 1 Living child     Seizures ([address]) 11/92    TIA (transient ischemic attack) 1/1/93      Past Surgical History:     Past Surgical History:   Procedure Laterality Date    ABDOMINAL ADHESION SURGERY N/A 09/29/2023    Procedure: LYSIS ADHESIONS;  Surgeon: Andrei Augustine MD;  [facility];  Service: Gynecology    BACK SURGERY      BREAST EXCISIONAL BIOPSY Left 02/14/2004    benign    HYSTERECTOMY  9/29/23    MAMMO STEREOTACTIC BREAST BIOPSY LEFT (ALL INC) Left 03/08/2023    NASAL SEPTUM SURGERY      Deviation Repair     IA CYSTOURETHROSCOPY N/A 09/29/2023    Procedure: CYSTOSCOPY;  Surgeon: Andrei Augustine MD;  [facility];  Service: Gynecology    IA LAPS TOTAL HYSTERECT 250 GM/< W/RMVL TUBE/OVARY Bilateral 09/29/2023    Procedure: HYSTERECTOMY LAPAROSCOPIC TOTAL  WITH BILATERAL SALPINGO-OOPHERECTOMY;  Surgeon: Andrei Augustine MD;  [facility]; Service: Gynecology    RHINOPLASTY        Social History:     Social History     Socioeconomic History    Marital status: /Civil Union     Spouse name: None    Number of children: None    Years of education: None    Highest education level: None   Occupational History    None   Tobacco Use    Smoking status: Never    Smokeless tobacco: Never   Vaping Use    Vaping Use: Never used   Substance and Sexual Activity    Alcohol use:  Yes     Alcohol/week: 7.0 standard drinks of alcohol     Types: 5 Glasses of wine, 2 Shots of liquor per week     Comment: glass wine/day    Drug use: Never    Sexual activity: Yes     Partners: Male     Birth control/protection: Female Sterilization     Comment:    Other Topics Concern    None   Social History Narrative    None     Social Determinants of Health     Financial Resource Strain: Not on file   Food Insecurity: Not on file   Transportation Needs: Not on file   Physical Activity: Not on file   Stress: Not on file   Social Connections: Not on file   Intimate Partner Violence: Not on file   Housing Stability: Not on file      Family History:     Family History   Problem Relation Age of Onset    Liver cancer Mother         age dx unknown    Colon cancer Mother 76    Cancer Mother         Liver and lung cancer    Heart attack Father     Hypertension Father     Heart disease Father     No Known Problems Maternal Grandmother     No Known Problems Maternal Grandfather     Breast cancer Paternal Grandmother         age dx unknown    Colon cancer Paternal Grandmother         age dx unknown    Stomach cancer Paternal Grandfather         age dx unknown    Multiple myeloma Brother     No Known Problems Brother     No Known Problems Brother     Breast cancer Maternal Aunt         age dx unknown    No Known Problems Maternal Aunt     No Known Problems Maternal Aunt     No Known Problems Paternal Aunt     No Known Problems Son     Ovarian cancer Neg Hx     Uterine cancer Neg Hx Cervical cancer Neg Hx       Current Medications:     Current Outpatient Medications   Medication Sig Dispense Refill    albuterol (ProAir HFA) 90 mcg/act inhaler Inhale 2 puffs every 4 (four) hours as needed for wheezing or shortness of breath 18 g 3    calcium carbonate (OS-MELISSA) 1250 (500 Ca) MG tablet Take 1 tablet (1,250 mg total) by mouth daily 150 tablet 3    ergocalciferol (VITAMIN D2) 50,000 units TAKE 1 CAPSULE BY MOUTH ONE TIME PER WEEK 12 capsule 0    ibuprofen (MOTRIN) 600 mg tablet Take 1 tablet (600 mg total) by mouth every 6 (six) hours as needed for mild pain 90 tablet 0    psyllium (METAMUCIL) 0.52 g capsule Take 0.52 g by mouth in the morning. Red Yeast Rice 600 MG TABS Take by mouth      sertraline (ZOLOFT) 50 mg tablet Take 1 tablet (50 mg total) by mouth daily (Patient taking differently: Take 50 mg by mouth daily at bedtime) 90 tablet 1    Spacer/Aero Chamber Mouthpiece (SPACER DEVICE) for metered dose inhaler For use with metered dose inhaler. [facility] 1 Device 0    acetaminophen (TYLENOL) 500 mg tablet Take 1,000 mg by mouth every 6 (six) hours as needed for mild pain (Patient not taking: Reported on 11/8/2023)       No current facility-administered medications for this visit. Allergies: Allergies   Allergen Reactions    Carbamazepine Hives    Cephalexin Hives    Flurbiprofen Fatigue    Phenytoin Hives      Physical Exam:     /88 (BP Location: Right arm, Patient Position: Sitting, Cuff Size: Large)   Pulse 85   Temp (!) 96.9 °F (36.1 °C) (Temporal)   Ht 4' 11" (1.499 m)   Wt 97.3 kg (214 lb 9.6 oz)   LMP 08/21/2023 (Exact Date) Comment: denies preg  SpO2 96%   BMI 43.34 kg/m²     Physical Exam  Vitals reviewed. Constitutional:       Appearance: Normal appearance. She is well-developed.    HENT:      Right Ear: External ear normal.      Left Ear: External ear normal.   Eyes:      Conjunctiva/sclera: Conjunctivae normal.      Pupils: Pupils are equal, round, and reactive to light. Cardiovascular:      Rate and Rhythm: Normal rate and regular rhythm. Heart sounds: Normal heart sounds. Pulmonary:      Effort: Pulmonary effort is normal.      Breath sounds: Normal breath sounds. Abdominal:      General: Bowel sounds are normal.      Palpations: Abdomen is soft. Musculoskeletal:         General: Normal range of motion. Cervical back: Normal range of motion and neck supple. Lymphadenopathy:      Cervical: No cervical adenopathy. Skin:     General: Skin is warm. Capillary Refill: Capillary refill takes less than 2 seconds. Neurological:      Mental Status: She is alert and oriented to person, place, and time. Psychiatric:         Mood and Affect: Mood normal.         Behavior: Behavior normal.         Thought Content:  Thought content normal.         Judgment: Judgment normal.          Rocco Taylor MD  [id number] [facility]

## 2023-11-10 ENCOUNTER — OFFICE VISIT (OUTPATIENT)
Dept: OBGYN CLINIC | Facility: CLINIC | Age: 48
End: 2023-11-10

## 2023-11-10 VITALS — DIASTOLIC BLOOD PRESSURE: 88 MMHG | BODY MASS INDEX: 42.82 KG/M2 | SYSTOLIC BLOOD PRESSURE: 144 MMHG | WEIGHT: 212 LBS

## 2023-11-10 DIAGNOSIS — Z90.710 S/P TOTAL HYSTERECTOMY AND BSO (BILATERAL SALPINGO-OOPHORECTOMY): Primary | ICD-10-CM

## 2023-11-10 DIAGNOSIS — Z90.79 S/P TOTAL HYSTERECTOMY AND BSO (BILATERAL SALPINGO-OOPHORECTOMY): Primary | ICD-10-CM

## 2023-11-10 DIAGNOSIS — Z90.722 S/P TOTAL HYSTERECTOMY AND BSO (BILATERAL SALPINGO-OOPHORECTOMY): Primary | ICD-10-CM

## 2023-11-10 PROCEDURE — 99024 POSTOP FOLLOW-UP VISIT: CPT | Performed by: STUDENT IN AN ORGANIZED HEALTH CARE EDUCATION/TRAINING PROGRAM

## 2023-11-10 NOTE — LETTER
November 10, 2023     Patient: Jade Cedeno  YOB: 1975  Date of Visit: 11/10/2023      To Whom it May Concern:    Elvis Joseph is under my professional care. Mark Medley was seen in my office on 11/10/2023. Mark Medley may return to work on 11/13/23 . If you have any questions or concerns, please don't hesitate to call.          Sincerely,          Lisa Egan MD        CC: No Recipients

## 2023-11-10 NOTE — PROGRESS NOTES
Assessment/Plan:       Problem List Items Addressed This Visit          Other    S/P total hysterectomy and BSO (bilateral salpingo-oophorectomy) - Primary         Plan     1. Wound care discussed  2. Activity restrictions: none. Should gradually increase activity and weight lifted. [facility] for intercourse  3. Anticipated return to work: 6wk postop  5. Follow up: annual    Subjective:      Patient ID: Ayden Srinivasan is a 50 y.o. y.o. female. HPI She presents today for 6 week postoperative follow up. She is feeling very well. The following portions of the patient's history were reviewed and updated as appropriate: allergies, current medications, past family history, past medical history, past social history, past surgical history and problem list.    Review of Systems  as above    Objective:  Vitals:    11/10/23 1058   BP: 144/88        Physical Exam   Constitutional: She is oriented to person, place, and time. She appears well-developed and well-nourished. HENT:   Head: Normocephalic. Eyes: EOM are normal.   Neck: Normal range of motion. Pulmonary/Chest: Effort normal.   Abdominal: Soft. She exhibits no distension and no mass. There is no tenderness. There is no rebound and no guarding. Incisions well-healed  Vagina intact, moist. Cuff intact with small amount of suture extruding at midline  Musculoskeletal: Normal range of motion. Neurological: She is alert and oriented to person, place, and time. Skin: Skin is warm and dry. Psychiatric: She has a normal mood and affect. Her behavior is normal.   Vitals reviewed.

## 2023-11-12 DIAGNOSIS — G62.9 NEUROPATHY: ICD-10-CM

## 2023-11-27 ENCOUNTER — TELEPHONE (OUTPATIENT)
Dept: GENETICS | Facility: CLINIC | Age: 48
End: 2023-11-27

## 2023-11-28 ENCOUNTER — DOCUMENTATION (OUTPATIENT)
Dept: GENETICS | Facility: CLINIC | Age: 48
End: 2023-11-28

## 2023-11-28 ENCOUNTER — CLINICAL SUPPORT (OUTPATIENT)
Dept: GENETICS | Facility: CLINIC | Age: 48
End: 2023-11-28
Payer: COMMERCIAL

## 2023-11-28 DIAGNOSIS — Z80.0 FAMILY HISTORY OF COLON CANCER: ICD-10-CM

## 2023-11-28 DIAGNOSIS — Z80.0 FAMILY HISTORY OF STOMACH CANCER: ICD-10-CM

## 2023-11-28 DIAGNOSIS — Z80.9 FAMILY HISTORY OF CANCER: ICD-10-CM

## 2023-11-28 DIAGNOSIS — Z80.3 FAMILY HISTORY OF BREAST CANCER: Primary | ICD-10-CM

## 2023-11-28 DIAGNOSIS — Z80.41 FAMILY HISTORY OF OVARIAN CANCER: ICD-10-CM

## 2023-11-28 DIAGNOSIS — Z80.8 FAMILY HISTORY OF SKIN CANCER: ICD-10-CM

## 2023-11-28 PROCEDURE — 36415 COLL VENOUS BLD VENIPUNCTURE: CPT

## 2023-11-28 NOTE — LETTER
2023     Brynda Alpers, [address]  [id number] [place] [address] [address] [id number]    Patient: Adrian Cedeno  YOB: 1975  Date of Visit: 2023      Dear Dr. Marbin Wilson: Thank you for referring Lashell Damon to me for evaluation. Below are my notes for this consultation. If you have questions, please do not hesitate to call me. I look forward to following your patient along with you. Sincerely,        Shirin Barrow        CC: Jade Meyers MD        Pre-Test Genetic Counseling Consult Note    Patient Name: Lashell Damon   /Age: 1975/48 y.o. Referring Provider: Brynda Alpers, CRNP     Date of Service: 2023  Genetic Counselor: Shirin Barrow MS, [facility]  Interpretation Services: None  [facility]    Babs Costello was referred to the [address] [place] [address] and Genetic Assessment Program due to her family history of breast and ovarian cancer . she presents today to discuss the possibility of a hereditary cancer syndrome, options for genetic testing, and implications for her and her family. Cancer History and Treatment:   Personal History: no personal history of cancer    Screening Hx:   Breast:  Breast Imagin23  Breast Biopsy: 3/8/23  Final Diagnosis   A. Left Breast, 9:00, 5 cm From Nipple, Stereotactic Core Needle Biopsy for Focal Asymmetry:   - Breast parenchyma within normal limits.  - Negative for atypical epithelial hyperplasia, in-situ carcinoma, and invasive carcinoma. Breast Density: Scattered fibroglandular density     Colon:  Colonoscopy: 21  FINDINGS:  The cecum appeared normal.  All observed locations appeared normal, including the ascending colon, hepatic flexure, transverse colon, splenic flexure, descending colon, sigmoid colon, rectosigmoid and rectum.     Gynecologic:  GLORIA/BSO 48    Skin:  No current screening    Other screening: none    Reproductive History  Age at menarche: 8  Age at first live birth:  29  Menopause: Post Menopausal (50)  Hormone replacement: none    Medical and Surgical History  Pertinent surgical history:   Past Surgical History:   Procedure Laterality Date   • ABDOMINAL ADHESION SURGERY N/A 09/29/2023    Procedure: LYSIS ADHESIONS;  Surgeon: Linsey Vaughn MD;  [facility];  Service: Gynecology   • BACK SURGERY     • BREAST EXCISIONAL BIOPSY Left 02/14/2004    benign   • HYSTERECTOMY  9/29/23   • MAMMO STEREOTACTIC BREAST BIOPSY LEFT (ALL INC) Left 03/08/2023   • NASAL SEPTUM SURGERY      Deviation Repair    • IL CYSTOURETHROSCOPY N/A 09/29/2023    Procedure: CYSTOSCOPY;  Surgeon: Linsey Vaughn MD;  [facility];  Service: Gynecology   • IL LAPS TOTAL HYSTERECT 250 GM/< W/RMVL TUBE/OVARY Bilateral 09/29/2023    Procedure: HYSTERECTOMY LAPAROSCOPIC TOTAL  WITH BILATERAL SALPINGO-OOPHERECTOMY;  Surgeon: Linsey Vaughn MD;  [facility];  Service: Gynecology   • RHINOPLASTY        Pertinent medical history:  Past Medical History:   Diagnosis Date   • Asthma 01/1993   • Complex partial epilepsy ([address])     Seisure free since age 25    • Diverticulitis of colon 11/92   • Epilepsy ([address])    • Factor 5 Leiden mutation, heterozygous ([address])    • Fracture, patella    • Lupus ([address]) 3/29/2002   • Pregnancy     Resulted in 1 Living child    • Seizures ([address]) 11/92   • TIA (transient ischemic attack) 1/1/93         Other History:  Height:   Ht Readings from Last 1 Encounters:   11/08/23 4' 11" (1.499 m)     Weight:   Wt Readings from Last 1 Encounters:   11/10/23 96.2 kg (212 lb)       Relevant Family History   Patient reports no Ashkenazi Gnosticist ancestry. Brother (54) history of multiple myeloma (dx 48)    Maternal Family History:   Mother (d.74) history of metastatic colon cancer (dx 76)  Aunt (d.69) history of breast cancer (dx early 62s)  Limited information on maternal family history    Paternal Family History:   Father (79) history of colon polyps, reportedly his latest colonoscopy was clear   Grandmother (d.98) history of uterine or cervical cancer (dx 79) breast cancer (dx 70) and lung cancer (dx 80)   Grandfather (d.85) history of stomach cancer (dx 77) and melanoma on the shoulder (dx 80)   Female first cousin through unaffected unccle (48) history of ovarian cancer (dx 34) and ocular melanoma (dx 37)    Please refer to the scanned pedigree in the Media Tab for a complete family history     *All history is reported as provided by the patient; records are not available for review, except where indicated. Assessment:  We discussed sporadic, familial and hereditary cancer. We also discussed the many factors that influence our risk for cancer such as age, environmental exposures, lifestyle choices and family history. We reviewed the indications suggestive of a hereditary predisposition to cancer. Yadiel Ferris is unaffected, but is considering genetic testing due to a family history of breast and ovarian cancer. Although Jyoti's paternal grandmother and paternal first cousin are the most informative person to undergo genetic testing, Yadiel Ferris can consider genetic testing due to the following:   Patient does not have cancer, but her grandmother is  and her first cousin is not available for testing, therefore she is the most informative person to test.    Genetic testing is indicated for Yadiel Ferris based on the following criteria: Meets NCCN V2.2024 Testing Criteria for High-Penetrance Breast Cancer Susceptibility Genes: family history of a second-degree relative diagnosed with breast cancer with a close blood relative diagnosed with ovarian cancer    The risks, benefits, and limitations of genetic testing were reviewed with the patient, as well as genetic discrimination laws, and possible test results (positive, negative, variants of uncertain significance) and their clinical implications.  If positive for a mutation, options for managing cancer risk including increased surveillance, chemoprevention, and in some cases prophylactic surgery were discussed. Steven Velasquez was informed that if a hereditary cancer syndrome was identified in her, first degree relatives (parents, siblings, and children) have a chance of also inheriting the condition. Genetic testing would allow for predictive genetic testing in other relatives, who may also be at risk depending on their degree of relation. Billing:  Most individuals pay <$100 for hereditary cancer genetic testing. If insurance covers the cost of the testing, individuals may still pay out of pocket secondary to co-pays, co-insurance, or deductibles. If the cost of the testing exceeds $100, the lab will reach out to the patient via phone or e-mail. The patient will then have the option to proceed with the testing, cancel the testing, or elect the self-pay option of $250. Steven Velasquez verbalized understanding. Plan: Patient decided to proceed with testing and provided consent.     Summary:     Sample Collection:  The patient's blood sample was drawn in the office on 11/28 by medical assistant Katy Brooks    Genetic Testing Preformed: [employer]: Cancer® +RNAinsight® (61 genes): APC, JERAMY, AXIN2, BAP1, BARD1, BMPR1A, BRCA1, BRCA2, BRIP1, CDH1, CDK4, CDKN1B, CDKN2A, CHEK2, CTNNA1, DICER1, EGLN1, EPCAM, FH, FLCN, GREM1, HOXB13, KIF1B, KIT, MAX, MEN1, MET, MITF, MLH1, MSH2, MSH3, MSH6, MUTYH, NBN, NF1, NTHL1, PALB2, PMS2, POLD1, POLE, POT1, PTEN, RAD50, RAD51C, RAD51D, RB1, RECQL, RET, SDHA, SDHAF2, SDHB, SDHC, SDHD, SMAD4, SMARCA4, STK11, NUCU576, TP53, TSC1, TSC2, VHL      Result Call Information:  In the event that we need to reach Steven Velasquez via telephone:  I confirmed the patient's mobile number on file as the best number to call with results  I confirmed with the patient that we can leave a voicemail on the provided numbers    Results take approximately 2-3 weeks to complete once test is started. Sravan Gil will be notified via [employer] once results are available. Additional recommendations for surveillance/medical management will be made pending genetic test results.

## 2023-11-28 NOTE — PROGRESS NOTES
Pre-Test Genetic Counseling Consult Note    Patient Name: Nikki CUMMINGS/Age: 1975/48 y.o. Referring Provider: MARGUERITE Howard     Date of Service: 2023  Genetic Counselor: Gwenyth Gowers, MS, [facility]  Interpretation Services: None  [facility]    Bony Le was referred to the [address] [place], [place] [address] Floor and Genetic Assessment Program due to her family history of breast and ovarian cancer . she presents today to discuss the possibility of a hereditary cancer syndrome, options for genetic testing, and implications for her and her family. Cancer History and Treatment:   Personal History: no personal history of cancer    Screening Hx:   Breast:  Breast Imagin23  Breast Biopsy: 3/8/23  Final Diagnosis   A. Left Breast, 9:00, 5 cm From Nipple, Stereotactic Core Needle Biopsy for Focal Asymmetry:   - Breast parenchyma within normal limits.  - Negative for atypical epithelial hyperplasia, in-situ carcinoma, and invasive carcinoma. Breast Density: Scattered fibroglandular density     Colon:  Colonoscopy: 21  FINDINGS:  The cecum appeared normal.  All observed locations appeared normal, including the ascending colon, hepatic flexure, transverse colon, splenic flexure, descending colon, sigmoid colon, rectosigmoid and rectum.     Gynecologic:  GLORIA/BSO 48    Skin:  No current screening    Other screening: none    Reproductive History  Age at menarche: 8  Age at first live birth:  29  Menopause: Post Menopausal (50)  Hormone replacement: none    Medical and Surgical History  Pertinent surgical history:   Past Surgical History:   Procedure Laterality Date    ABDOMINAL ADHESION SURGERY N/A 2023    Procedure: LYSIS ADHESIONS;  Surgeon: Andrei Augustine MD;  Location: [facility];  Service: Gynecology    BACK SURGERY      BREAST EXCISIONAL BIOPSY Left 2004    benign    HYSTERECTOMY  23    MAMMO STEREOTACTIC BREAST BIOPSY LEFT (ALL INC) Left 03/08/2023    NASAL SEPTUM SURGERY      Deviation Repair     NJ CYSTOURETHROSCOPY N/A 09/29/2023    Procedure: CYSTOSCOPY;  Surgeon: Randell Root MD;  Location: [facility];  Service: Gynecology    NJ LAPS TOTAL HYSTERECT 250 GM/< W/RMVL TUBE/OVARY Bilateral 09/29/2023    Procedure: HYSTERECTOMY LAPAROSCOPIC TOTAL  WITH BILATERAL SALPINGO-OOPHERECTOMY;  Surgeon: Randell Root MD;  Location: [facility];  Service: Gynecology    RHINOPLASTY        Pertinent medical history:  Past Medical History:   Diagnosis Date    Asthma 01/1993    Complex partial epilepsy ([address])     Jeri Poet free since age 25     Diverticulitis of colon 11/92    Epilepsy ([address])     Factor 5 Leiden mutation, heterozygous ([address])     Fracture, patella     Lupus ([address]) 3/29/2002    Pregnancy     Resulted in 1 Living child     Seizures ([address]) 11/92    TIA (transient ischemic attack) 1/1/93         Other History:  Height:   Ht Readings from Last 1 Encounters:   11/08/23 4' 11" (1.499 m)     Weight:   Wt Readings from Last 1 Encounters:   11/10/23 96.2 kg (212 lb)       Relevant Family History   Patient reports no Ashkenazi Shinto ancestry. Brother (54) history of multiple myeloma (dx 48)    Maternal Family History:   Mother (d.74) history of metastatic colon cancer (dx 76)  Aunt (d.69) history of breast cancer (dx early 62s)  Limited information on maternal family history    Paternal Family History:   Father (79) history of colon polyps, reportedly his latest colonoscopy was clear   Grandmother (d.98) history of uterine or cervical cancer (dx 79) breast cancer (dx 70) and lung cancer (dx 80)   Grandfather (d.85) history of stomach cancer (dx 77) and melanoma on the shoulder (dx 80)   Female first cousin through unaffected unccle (48) history of ovarian cancer (dx 34) and ocular melanoma (dx 37)    Please refer to the scanned pedigree in the Media Tab for a complete family history     *All history is reported as provided by the patient; records are not available for review, except where indicated. Assessment:  We discussed sporadic, familial and hereditary cancer. We also discussed the many factors that influence our risk for cancer such as age, environmental exposures, lifestyle choices and family history. We reviewed the indications suggestive of a hereditary predisposition to cancer. Dann Lugo is unaffected, but is considering genetic testing due to a family history of breast and ovarian cancer. Although Jyoti's paternal grandmother and paternal first cousin are the most informative person to undergo genetic testing, Dann Lugo can consider genetic testing due to the following:   Patient does not have cancer, but her grandmother is  and her first cousin is not available for testing, therefore she is the most informative person to test.    Genetic testing is indicated for Dann Lugo based on the following criteria: Meets NCCN V2.2024 Testing Criteria for High-Penetrance Breast Cancer Susceptibility Genes: family history of a second-degree relative diagnosed with breast cancer with a close blood relative diagnosed with ovarian cancer    The risks, benefits, and limitations of genetic testing were reviewed with the patient, as well as genetic discrimination laws, and possible test results (positive, negative, variants of uncertain significance) and their clinical implications. If positive for a mutation, options for managing cancer risk including increased surveillance, chemoprevention, and in some cases prophylactic surgery were discussed. Dann Lugo was informed that if a hereditary cancer syndrome was identified in her, first degree relatives (parents, siblings, and children) have a chance of also inheriting the condition. Genetic testing would allow for predictive genetic testing in other relatives, who may also be at risk depending on their degree of relation.      Billing:  Most individuals pay <$100 for hereditary cancer genetic testing. If insurance covers the cost of the testing, individuals may still pay out of pocket secondary to co-pays, co-insurance, or deductibles. If the cost of the testing exceeds $100, the lab will reach out to the patient via phone or e-mail. The patient will then have the option to proceed with the testing, cancel the testing, or elect the self-pay option of $250. Render Melidal verbalized understanding. Plan: Patient decided to proceed with testing and provided consent. Summary:     Sample Collection:  The patient's blood sample was drawn in the office on 11/28 by medical assistant Lupillo Tinajero    Genetic Testing Preformed: CustomNext: Cancer® +RNAinsight® (61 genes): APC, JERAMY, AXIN2, BAP1, BARD1, BMPR1A, BRCA1, BRCA2, BRIP1, CDH1, CDK4, CDKN1B, CDKN2A, CHEK2, CTNNA1, DICER1, EGLN1, EPCAM, FH, FLCN, GREM1, HOXB13, KIF1B, KIT, MAX, MEN1, MET, MITF, MLH1, MSH2, MSH3, MSH6, MUTYH, NBN, NF1, NTHL1, PALB2, PMS2, POLD1, POLE, POT1, PTEN, RAD50, RAD51C, RAD51D, RB1, RECQL, RET, SDHA, SDHAF2, SDHB, SDHC, SDHD, SMAD4, SMARCA4, STK11, APEW234, TP53, TSC1, TSC2, VHL      Result Call Information:  In the event that we need to reach Render Merl via telephone:  I confirmed the patient's mobile number on file as the best number to call with results  I confirmed with the patient that we can leave a voicemail on the provided numbers    Results take approximately 2-3 weeks to complete once test is started. Render Merl will be notified via [employer] once results are available. Additional recommendations for surveillance/medical management will be made pending genetic test results.

## 2023-12-15 ENCOUNTER — TELEPHONE (OUTPATIENT)
Dept: GENETICS | Facility: CLINIC | Age: 48
End: 2023-12-15

## 2023-12-15 DIAGNOSIS — Z15.89 MONOALLELIC MUTATION OF MITF GENE: Primary | ICD-10-CM

## 2023-12-15 DIAGNOSIS — Z15.09 MONOALLELIC MUTATION OF MITF GENE: Primary | ICD-10-CM

## 2023-12-15 NOTE — TELEPHONE ENCOUNTER
Post-Test Genetic Counseling Consult Note  Today I spoke with Sravan Gil over the phone to review the results of her genetic test for hereditary cancer. We met previously on 11/28 for pre-test counseling. 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 The MITF gene is associated wit hautosomal dominant predisposition to melanoma and renal cell cancer. MITF-Associated Cancer Risks  The lifetime risk for melanoma in individuals with this particular variant has been estimated to be 2 to 5 times the average risk (PMID: 46587531, 95750760, [id number]). Individuals with this variant often develop numerous moles on the skin that can become cancerous. Melanomas may develop at an early age and can grow quickly. The lifetime risk for renal cell cancer (RCC) has been estimated to be up to 5 times higher than average. However, sample sizes from these studies were small and accurate risks are still being refined (PMID: 27656970, 70889137). Risks and Testing for Family Members: This test result may help clarify the risk for other family members to develop cancer. All first-degree relatives (parents, siblings and children) have up to a 50% chance of having the pathogenic variant.   Other relatives such as aunts, uncles and cousins may also be at risk. Since it is not known with one-hundred percent certainty which side of the family this MITF  pathogenic variant came from, we recommend that Mark Medley share this test results with extended family members from both sides of her family. We encouraged Mark Medley  to discuss this information with her relatives. If Mark Medley family members have any questions or are interested in testing they can reach out to the [employer] number at [phone] for additional information. Assessment for CHEK2 VUS:  A variant of uncertain significance (VUS) means that a change was identified in a specific gene but it cannot be determined whether the variant is associated with an increased risk of cancer or is a harmless genetic change. The significance of the CHEK2 variant is currently not known and therefore this test result cannot be used to help determine Mark Medley cancer risks. It is possible that the variant was seen in only a handful of individuals, or there may be conflicting or incomplete information in the medical literature about the variant and its association with hereditary cancer. The lab will continue to accumulate data on this variant over time and will provide an update once they can confidently classify the CHEK2 varian as harmful or benign. Risk Based on Family History:  Jyoti's risk of breast cancer may still be increased based on her personal risk factors and family history. Despite a negative test result, we are able to run risk models to better estimate Jyoti's lifetime risk of developing breast cancer.  I ran three risk models based on the information Mark Medley provided during our pre-test counseling session:    Himaer-Vernon model: Estimated lifetime risk, to age 80, for breast cancer is 15.4% compared to approximately 9.8% general population risk     Destiny Clay model:Estimated lifetime risk, to age 80, for breast cancer is 11.1% compared to approximately 11.5% general population risk     Kade tables: Estimated lifetime risk, to age 78, for breast cancer is 9.1%    Although these risk models do not predict a significantly increased lifetime risk, we cannot eliminate the possibility of an increased risk for breast cancer for Sven Zamudio given her family history. I also reminded Sven Zamudio that there is still a background risk for any woman to develop breast cancer over her lifetime (12-13%), regardless of family history. Lakeshas risk of colorectal cancer is increased, based on the reported family history. As compared with the general population risk of approximately 4%, empiric data suggest that Lakeshas risk to develop colorectal cancer by age 78 is approximately 9% given one first-degree relative with colorectal cancer. Additional Information:  A healthy lifestyle will improve overall health and reduce risk for illness. Eating a healthy diet and exercising for 4 hours per week is recommended. Both diet and exercise have been shown to help maintain a healthy weight. Postmenopausal women who are overweight are at higher risk for breast cancer. Moderate to heavy alcohol use can increase the risk for some cancers. Smoking cigarettes can also increase risk for breast, lung, prostate, pancreatic and other cancers. Management:  There are no current established guidelines for cancer screening in individuals who carry an MITF mutations. Screening recommendations have been proposed for melanoma screening by various groups (PMID 94310459 and PMID: 08376315) which are outlined below. Although no distinct guidelines for renal cell cancer, non-invasive/low-cost renal ultrasounds may be considered as an option.     Plan:  Skin Cancer Screening:    Clinical skin exams (total body) every 3-12 months starting at puberty   Exams may involve additional methods to track skin changes over time, including taking images of the entire body (longitudinal photography) as well as close-up images of specific skin lesions (digital dermoscopy). The frequency of exams may vary based on the findings from previous exams and an individual's other risk factors. Providers should consider a low threshold for biopsies of suspicious skin findings due to the higher chance of melanoma in these individuals. Perform skin self-exams every month starting as early as possible   Education on the skin changes to look for and self-exams can help find suspicious lesions. Education on sun protection and skin cancer prevention   When outdoors, wear sunscreen, sun-protective clothing, hats and sunglasses. Avoid tanning beds, excessive sun exposure and sunburns. Renal Cancer Screening:  Although there are no established guidelines for renal cancer screening, individuals may consider renal ultrasounds every 1-2 years as a safe, low-cost screening method. Frequency and type of screening should be done at the discretion of Jyoti's healthcare providers. Other Screening  There are no additional recommendations based on Jyoti's result. she should continue cancer screening and medical management as clinically indicated and as determined appropriate by her healthcare providers. Orders Placed:   - Ambulatory Referral to Hematology Oncology to meet with Dr. Esperanza Lundberg for genetics follow up   - ambulatory Referral to Dermatology for high risk screening    Positive Result: Papito Wild was strongly encouraged to follow up on with our office on an annual basis to review the most up to date guidelines as recommendations are subject to change over time    VUS Result: Papito Wild was strongly encouraged to contact us regarding any changes in her personal or family history of cancer as these changes could alter our recommendation regarding genetic testing and/or cancer screening. Papitoumer Wild was also encouraged to follow up with us on an annual basis as variant classifications are subject to change.

## 2023-12-16 PROBLEM — Z15.09 MONOALLELIC MUTATION OF MITF GENE: Status: ACTIVE | Noted: 2023-12-16

## 2023-12-16 PROBLEM — Z15.89 MONOALLELIC MUTATION OF MITF GENE: Status: ACTIVE | Noted: 2023-12-16

## 2023-12-18 ENCOUNTER — TELEPHONE (OUTPATIENT)
Dept: HEMATOLOGY ONCOLOGY | Facility: CLINIC | Age: 48
End: 2023-12-18

## 2023-12-18 NOTE — TELEPHONE ENCOUNTER
I called Jyoti in response to a referral that was received for patient to establish care with Medical Oncology.     Outreach was made to schedule a consultation.    A consultation was scheduled for patient during this call. Patient is scheduled on 1/12/24 at 11:20am with Dr. Buckley at the [facility]

## 2024-01-12 ENCOUNTER — OFFICE VISIT (OUTPATIENT)
Dept: HEMATOLOGY ONCOLOGY | Facility: CLINIC | Age: 49
End: 2024-01-12
Payer: COMMERCIAL

## 2024-01-12 VITALS
HEART RATE: 92 BPM | SYSTOLIC BLOOD PRESSURE: 128 MMHG | HEIGHT: 59 IN | BODY MASS INDEX: 42.78 KG/M2 | TEMPERATURE: 97.7 F | RESPIRATION RATE: 17 BRPM | OXYGEN SATURATION: 98 % | WEIGHT: 212.2 LBS | DIASTOLIC BLOOD PRESSURE: 84 MMHG

## 2024-01-12 DIAGNOSIS — D68.51 FACTOR V LEIDEN MUTATION (HCC): ICD-10-CM

## 2024-01-12 DIAGNOSIS — Z15.89 MONOALLELIC MUTATION OF MITF GENE: Primary | ICD-10-CM

## 2024-01-12 DIAGNOSIS — R10.32 LLQ PAIN: ICD-10-CM

## 2024-01-12 DIAGNOSIS — Z15.09 MONOALLELIC MUTATION OF MITF GENE: Primary | ICD-10-CM

## 2024-01-12 PROCEDURE — 99203 OFFICE O/P NEW LOW 30 MIN: CPT | Performed by: INTERNAL MEDICINE

## 2024-01-13 NOTE — PROGRESS NOTES
Oncology Outpatient Consult Note  Jyoti Cedeno 48 y.o. 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  She also has a CHEK2 VUS.  She also has a heterozygous factor V Leiden gene mutation.  There was a question of a TIA when she was 18 but she has never had a definite thrombotic event.  While she was pregnant with her children she took heparin.  She also thinks she was put on Coumadin after her pregnancy but then that was stopped when she had a fall with internal bleeding in 2006.  Today she has complaints of left lower quadrant pain that started at the end of the summer.  She is status post GLORIA/BSO for adenomyosis and endometriosis.  She has also had some upper back pain since May.  Her bowel movements are relatively normal.  They do not affect the pain.  Eating does not affect the pain.  She denies any hematuria or obvious flank pain.  No dysuria.  She has never seen a dermatologist.  No unexplained weight loss.  Her last mammogram was in September 2023.  She is up-to-date with her colonoscopy.  She does not smoke or drink.  She has one child who is 21 years old.        ROS: As stated in history of present illness otherwise her 14 point review of systems today was negative.    ECOG PS:0      Test Results:    Imaging: No results found.    Labs:   Lab Results   Component Value Date    WBC 9.44 09/08/2023    HGB 13.2 09/08/2023    HCT 41.7 09/08/2023    MCV 96 09/08/2023     09/08/2023     Lab Results   Component Value Date     01/19/2017    K 4.6 09/08/2023     09/08/2023    CO2 29 09/08/2023    BUN 17 09/08/2023    CREATININE 0.75 09/08/2023    GLUF 111 (H) 09/08/2023    CALCIUM 9.1 09/08/2023    AST 17 05/31/2023    ALT 19 05/31/2023    ALKPHOS 93 05/31/2023    PROT 7.5 01/19/2017    BILITOT 0.3 01/19/2017    EGFR 94  "09/08/2023         No results found for: \"SPEP\", \"UPEP\"    No results found for: \"PSA\"    No results found for: \"CEA\"    No results found for: \"AFP\"    No results found for: \"IRON\", \"TIBC\", \"FERRITIN\"    No results found for: \"COSPXBHM11\"            Active Problems:   Patient Active Problem List   Diagnosis    Asthma    Classic migraine with aura    Dyslipidemia    Factor V Leiden mutation (HCC)    Hyperglycemia    Morbid obesity (HCC)    Vitamin D deficiency    Family history of cancer    S/P total hysterectomy and BSO (bilateral salpingo-oophorectomy)    Monoallelic mutation of MITF gene       Past Medical History:   Past Medical History:   Diagnosis Date    Asthma 01/1993    Complex partial epilepsy (HCC)     Seisure free since age 18     Diverticulitis of colon 11/92    Epilepsy (HCC)     Factor 5 Leiden mutation, heterozygous (HCC)     Fracture, patella     Lupus (HCC) 3/29/2002    Pregnancy     Resulted in 1 Living child     Seizures (HCC) 11/92    TIA (transient ischemic attack) 1/1/93       Surgical History:   Past Surgical History:   Procedure Laterality Date    ABDOMINAL ADHESION SURGERY N/A 09/29/2023    Procedure: LYSIS ADHESIONS;  Surgeon: Maite Maldonado MD;  Location: [facility];  Service: Gynecology    BACK SURGERY      BREAST EXCISIONAL BIOPSY Left 02/14/2004    benign    HYSTERECTOMY  9/29/23    MAMMO STEREOTACTIC BREAST BIOPSY LEFT (ALL INC) Left 03/08/2023    NASAL SEPTUM SURGERY      Deviation Repair     NM CYSTOURETHROSCOPY N/A 09/29/2023    Procedure: CYSTOSCOPY;  Surgeon: Maite Maldonado MD;  Location: [facility];  Service: Gynecology    NM LAPS TOTAL HYSTERECT 250 GM/< W/RMVL TUBE/OVARY Bilateral 09/29/2023    Procedure: HYSTERECTOMY LAPAROSCOPIC TOTAL  WITH BILATERAL SALPINGO-OOPHERECTOMY;  Surgeon: Maite Maldonado MD;  Location: [facility];  Service: Gynecology    RHINOPLASTY         Family History:    Family History   Problem Relation Age of Onset    Liver cancer Mother         age " dx unknown    Colon cancer Mother 74    Cancer Mother         Liver and lung cancer    Heart attack Father     Hypertension Father     Heart disease Father     No Known Problems Maternal Grandmother     No Known Problems Maternal Grandfather     Breast cancer Paternal Grandmother         age dx unknown    Colon cancer Paternal Grandmother         age dx unknown    Stomach cancer Paternal Grandfather         age dx unknown    Multiple myeloma Brother     No Known Problems Brother     No Known Problems Brother     Breast cancer Maternal Aunt         age dx unknown    No Known Problems Maternal Aunt     No Known Problems Maternal Aunt     No Known Problems Paternal Aunt     No Known Problems Son     Ovarian cancer Neg Hx     Uterine cancer Neg Hx     Cervical cancer Neg Hx        Cancer-related family history includes Breast cancer in her maternal aunt and paternal grandmother; Cancer in her mother; Colon cancer in her paternal grandmother; Colon cancer (age of onset: 74) in her mother; Liver cancer in her mother; Stomach cancer in her paternal grandfather. There is no history of Ovarian cancer, Uterine cancer, or Cervical cancer.    Social History:   Social History     Socioeconomic History    Marital status: /Civil Union     Spouse name: Not on file    Number of children: Not on file    Years of education: Not on file    Highest education level: Not on file   Occupational History    Not on file   Tobacco Use    Smoking status: Never    Smokeless tobacco: Never   Vaping Use    Vaping status: Never Used   Substance and Sexual Activity    Alcohol use: Yes     Alcohol/week: 7.0 standard drinks of alcohol     Types: 5 Glasses of wine, 2 Shots of liquor per week     Comment: glass wine/day    Drug use: Never    Sexual activity: Yes     Partners: Male     Birth control/protection: Female Sterilization     Comment:    Other Topics Concern    Not on file   Social History Narrative    Not on file     Social  Determinants of Health     Financial Resource Strain: Not on file   Food Insecurity: Not on file   Transportation Needs: Not on file   Physical Activity: Not on file   Stress: Not on file   Social Connections: Not on file   Intimate Partner Violence: Not on file   Housing Stability: Not on file       Current Medications:   Current Outpatient Medications   Medication Sig Dispense Refill    albuterol (ProAir HFA) 90 mcg/act inhaler Inhale 2 puffs every 4 (four) hours as needed for wheezing or shortness of breath 18 g 3    calcium carbonate (OS-MELISSA) 1250 (500 Ca) MG tablet Take 1 tablet (1,250 mg total) by mouth daily 150 tablet 3    ergocalciferol (VITAMIN D2) 50,000 units TAKE 1 CAPSULE BY MOUTH ONE TIME PER WEEK 12 capsule 0    psyllium (METAMUCIL) 0.52 g capsule Take 0.52 g by mouth in the morning.      Red Yeast Rice 600 MG TABS Take by mouth      sertraline (ZOLOFT) 50 mg tablet Take 1 tablet (50 mg total) by mouth daily 90 tablet 1    Spacer/Aero Chamber Mouthpiece (SPACER DEVICE) for metered dose inhaler For use with metered dose inhaler. Optichamber Mary VHC 1 Device 0     No current facility-administered medications for this visit.       Allergies:   Allergies   Allergen Reactions    Carbamazepine Hives    Cephalexin Hives    Flurbiprofen Fatigue    Phenytoin Hives         Physical Exam:    Body surface area is 1.89 meters squared.    Wt Readings from Last 3 Encounters:   01/12/24 96.3 kg (212 lb 3.2 oz)   11/10/23 96.2 kg (212 lb)   11/08/23 97.3 kg (214 lb 9.6 oz)        Temp Readings from Last 3 Encounters:   01/12/24 97.7 °F (36.5 °C)   11/08/23 (!) 96.9 °F (36.1 °C) (Temporal)   09/29/23 (!) 97.3 °F (36.3 °C)        BP Readings from Last 3 Encounters:   01/12/24 128/84   11/10/23 144/88   11/08/23 136/88         Pulse Readings from Last 3 Encounters:   01/12/24 92   11/08/23 85   09/29/23 86     @LASTSAO2(3)@    Physical Exam     Constitutional   General appearance: No acute distress, well appearing  and well nourished.    Eyes   Conjunctiva and lids: No swelling, erythema or discharge.    Pupils and irises: Equal, round and reactive to light.    Ears, Nose, Mouth, and Throat   External inspection of ears and nose: Normal.    Nasal mucosa, septum, and turbinates: Normal without edema or erythema.    Oropharynx: Normal with no erythema, edema, exudate or lesions.    Pulmonary   Respiratory effort: No increased work of breathing or signs of respiratory distress.    Auscultation of lungs: Clear to auscultation.    Cardiovascular   Palpation of heart: Normal PMI, no thrills.    Auscultation of heart: Normal rate and rhythm, normal S1 and S2, without murmurs.    Examination of extremities for edema and/or varicosities: Normal.    Carotid pulses: Normal.    Abdomen   Abdomen: LLQ tenderness, no masses.    Liver and spleen: No hepatomegaly or splenomegaly.    Lymphatic   Palpation of lymph nodes in neck: No lymphadenopathy.    Musculoskeletal   Gait and station: Normal.    Digits and nails: Normal without clubbing or cyanosis.    Inspection/palpation of joints, bones, and muscles: Normal.    Skin   Skin and subcutaneous tissue: Normal without rashes or lesions.    Neurologic   Cranial nerves: Cranial nerves 2-12 intact.    Sensation: No sensory loss.    Psychiatric   Orientation to person, place, and time: Normal.    Mood and affect: Normal.          Assessment/ Plan: 48-year-old female with a pathogenic MITF mutation.  The data about this mutation is a bit limited.  As far as we know with the dealer available there is a 2-5 time average risk of melanoma and a 5 times high risk of renal cell carcinoma.  I have referred the patient to dermatology.  Because of the left lower quadrant pain she is having in the left lower quadrant, I am going to order a CT of the abdomen and pelvis.  She will need yearly renal ultrasounds going forward.  CT scan is being done today to evaluate both the kidneys and the lower quadrant pain  she is having.  I will call her with that result.  I will plan on seeing her on a yearly basis.  As far as her heterozygous factor V Leiden mutation goes, it is clear unclear to me if she ever actually had a thrombotic event.  If she did it was many years ago and I would not recommend any active anticoagulation at this time.        I spent 30 minutes in chart review, face to face counseling, coordination of care, and documentation.

## 2024-01-19 ENCOUNTER — HOSPITAL ENCOUNTER (OUTPATIENT)
Dept: RADIOLOGY | Age: 49
Discharge: HOME | End: 2024-01-19
Payer: COMMERCIAL

## 2024-01-19 DIAGNOSIS — R10.32 LLQ PAIN: ICD-10-CM

## 2024-01-19 DIAGNOSIS — Z15.09 MONOALLELIC MUTATION OF MITF GENE: ICD-10-CM

## 2024-01-19 DIAGNOSIS — Z15.89 MONOALLELIC MUTATION OF MITF GENE: ICD-10-CM

## 2024-01-19 PROCEDURE — 74177 CT ABD & PELVIS W/CONTRAST: CPT

## 2024-01-19 RX ADMIN — IOHEXOL 100 ML: 350 INJECTION, SOLUTION INTRAVENOUS at 12:56

## 2024-01-29 ENCOUNTER — NURSE TRIAGE (OUTPATIENT)
Age: 49
End: 2024-01-29

## 2024-01-29 ENCOUNTER — TELEPHONE (OUTPATIENT)
Age: 49
End: 2024-01-29

## 2024-01-29 ENCOUNTER — HOSPITAL ENCOUNTER (EMERGENCY)
Facility: HOSPITAL | Age: 49
Discharge: HOME | End: 2024-01-29
Attending: EMERGENCY MEDICINE
Payer: COMMERCIAL

## 2024-01-29 ENCOUNTER — APPOINTMENT (EMERGENCY)
Dept: RADIOLOGY | Facility: HOSPITAL | Age: 49
End: 2024-01-29
Payer: COMMERCIAL

## 2024-01-29 ENCOUNTER — APPOINTMENT (EMERGENCY)
Dept: CT IMAGING | Facility: HOSPITAL | Age: 49
End: 2024-01-29
Payer: COMMERCIAL

## 2024-01-29 VITALS
RESPIRATION RATE: 18 BRPM | SYSTOLIC BLOOD PRESSURE: 194 MMHG | HEART RATE: 72 BPM | OXYGEN SATURATION: 98 % | TEMPERATURE: 98 F | DIASTOLIC BLOOD PRESSURE: 97 MMHG

## 2024-01-29 DIAGNOSIS — R11.2 NAUSEA AND VOMITING: ICD-10-CM

## 2024-01-29 DIAGNOSIS — R07.9 ACUTE CHEST PAIN: ICD-10-CM

## 2024-01-29 DIAGNOSIS — R10.9 ACUTE ABDOMINAL PAIN: Primary | ICD-10-CM

## 2024-01-29 DIAGNOSIS — R03.0 ELEVATED BLOOD PRESSURE READING: ICD-10-CM

## 2024-01-29 LAB
ALBUMIN SERPL BCP-MCNC: 4.3 G/DL (ref 3.5–5)
ALP SERPL-CCNC: 91 U/L (ref 34–104)
ALT SERPL W P-5'-P-CCNC: 15 U/L (ref 7–52)
ANION GAP SERPL CALCULATED.3IONS-SCNC: 8 MMOL/L
AST SERPL W P-5'-P-CCNC: 15 U/L (ref 13–39)
BASOPHILS # BLD AUTO: 0.06 THOUSANDS/ÂΜL (ref 0–0.1)
BASOPHILS NFR BLD AUTO: 1 % (ref 0–1)
BILIRUB SERPL-MCNC: 0.32 MG/DL (ref 0.2–1)
BILIRUB UR QL STRIP: NEGATIVE
BUN SERPL-MCNC: 14 MG/DL (ref 5–25)
CALCIUM SERPL-MCNC: 9.5 MG/DL (ref 8.4–10.2)
CARDIAC TROPONIN I PNL SERPL HS: <2 NG/L
CHLORIDE SERPL-SCNC: 104 MMOL/L (ref 96–108)
CLARITY UR: CLEAR
CO2 SERPL-SCNC: 28 MMOL/L (ref 21–32)
COLOR UR: COLORLESS
CREAT SERPL-MCNC: 0.87 MG/DL (ref 0.6–1.3)
EOSINOPHIL # BLD AUTO: 0.22 THOUSAND/ÂΜL (ref 0–0.61)
EOSINOPHIL NFR BLD AUTO: 2 % (ref 0–6)
ERYTHROCYTE [DISTWIDTH] IN BLOOD BY AUTOMATED COUNT: 13.2 % (ref 11.6–15.1)
GFR SERPL CREATININE-BSD FRML MDRD: 79 ML/MIN/1.73SQ M
GLUCOSE SERPL-MCNC: 91 MG/DL (ref 65–140)
GLUCOSE UR STRIP-MCNC: NEGATIVE MG/DL
HCT VFR BLD AUTO: 39.7 % (ref 34.8–46.1)
HGB BLD-MCNC: 12.4 G/DL (ref 11.5–15.4)
HGB UR QL STRIP.AUTO: NEGATIVE
IMM GRANULOCYTES # BLD AUTO: 0.05 THOUSAND/UL (ref 0–0.2)
IMM GRANULOCYTES NFR BLD AUTO: 0 % (ref 0–2)
KETONES UR STRIP-MCNC: NEGATIVE MG/DL
LEUKOCYTE ESTERASE UR QL STRIP: NEGATIVE
LYMPHOCYTES # BLD AUTO: 3.41 THOUSANDS/ÂΜL (ref 0.6–4.47)
LYMPHOCYTES NFR BLD AUTO: 28 % (ref 14–44)
MCH RBC QN AUTO: 29.7 PG (ref 26.8–34.3)
MCHC RBC AUTO-ENTMCNC: 31.2 G/DL (ref 31.4–37.4)
MCV RBC AUTO: 95 FL (ref 82–98)
MONOCYTES # BLD AUTO: 0.59 THOUSAND/ÂΜL (ref 0.17–1.22)
MONOCYTES NFR BLD AUTO: 5 % (ref 4–12)
NEUTROPHILS # BLD AUTO: 7.95 THOUSANDS/ÂΜL (ref 1.85–7.62)
NEUTS SEG NFR BLD AUTO: 64 % (ref 43–75)
NITRITE UR QL STRIP: NEGATIVE
NRBC BLD AUTO-RTO: 0 /100 WBCS
PH UR STRIP.AUTO: 5 [PH]
PLATELET # BLD AUTO: 337 THOUSANDS/UL (ref 149–390)
PMV BLD AUTO: 10.3 FL (ref 8.9–12.7)
POTASSIUM SERPL-SCNC: 3.6 MMOL/L (ref 3.5–5.3)
PROT SERPL-MCNC: 7.7 G/DL (ref 6.4–8.4)
PROT UR STRIP-MCNC: NEGATIVE MG/DL
RBC # BLD AUTO: 4.17 MILLION/UL (ref 3.81–5.12)
SODIUM SERPL-SCNC: 140 MMOL/L (ref 135–147)
SP GR UR STRIP.AUTO: 1.01 (ref 1–1.03)
UROBILINOGEN UR STRIP-ACNC: <2 MG/DL
WBC # BLD AUTO: 12.28 THOUSAND/UL (ref 4.31–10.16)

## 2024-01-29 PROCEDURE — 93005 ELECTROCARDIOGRAM TRACING: CPT

## 2024-01-29 PROCEDURE — 99284 EMERGENCY DEPT VISIT MOD MDM: CPT

## 2024-01-29 PROCEDURE — 74177 CT ABD & PELVIS W/CONTRAST: CPT

## 2024-01-29 PROCEDURE — 36415 COLL VENOUS BLD VENIPUNCTURE: CPT | Performed by: EMERGENCY MEDICINE

## 2024-01-29 PROCEDURE — 71045 X-RAY EXAM CHEST 1 VIEW: CPT

## 2024-01-29 PROCEDURE — 96361 HYDRATE IV INFUSION ADD-ON: CPT

## 2024-01-29 PROCEDURE — 81003 URINALYSIS AUTO W/O SCOPE: CPT | Performed by: EMERGENCY MEDICINE

## 2024-01-29 PROCEDURE — 96374 THER/PROPH/DIAG INJ IV PUSH: CPT

## 2024-01-29 PROCEDURE — 99285 EMERGENCY DEPT VISIT HI MDM: CPT | Performed by: EMERGENCY MEDICINE

## 2024-01-29 PROCEDURE — G1004 CDSM NDSC: HCPCS

## 2024-01-29 PROCEDURE — 85025 COMPLETE CBC W/AUTO DIFF WBC: CPT | Performed by: EMERGENCY MEDICINE

## 2024-01-29 PROCEDURE — 80053 COMPREHEN METABOLIC PANEL: CPT | Performed by: EMERGENCY MEDICINE

## 2024-01-29 PROCEDURE — 84484 ASSAY OF TROPONIN QUANT: CPT | Performed by: EMERGENCY MEDICINE

## 2024-01-29 RX ORDER — ONDANSETRON 4 MG/1
4 TABLET, FILM COATED ORAL EVERY 8 HOURS PRN
Qty: 12 TABLET | Refills: 0 | Status: SHIPPED | OUTPATIENT
Start: 2024-01-29

## 2024-01-29 RX ORDER — SUCRALFATE 1 G/1
1 TABLET ORAL 4 TIMES DAILY PRN
Qty: 5 TABLET | Refills: 0 | Status: SHIPPED | OUTPATIENT
Start: 2024-01-29

## 2024-01-29 RX ORDER — SUCRALFATE 1 G/1
1 TABLET ORAL ONCE
Status: COMPLETED | OUTPATIENT
Start: 2024-01-29 | End: 2024-01-29

## 2024-01-29 RX ORDER — DROPERIDOL 2.5 MG/ML
0.62 INJECTION, SOLUTION INTRAMUSCULAR; INTRAVENOUS ONCE
Status: COMPLETED | OUTPATIENT
Start: 2024-01-29 | End: 2024-01-29

## 2024-01-29 RX ADMIN — DROPERIDOL 0.62 MG: 2.5 INJECTION, SOLUTION INTRAMUSCULAR; INTRAVENOUS at 17:24

## 2024-01-29 RX ADMIN — IOHEXOL 100 ML: 350 INJECTION, SOLUTION INTRAVENOUS at 18:08

## 2024-01-29 RX ADMIN — SUCRALFATE 1 G: 1 TABLET ORAL at 17:23

## 2024-01-29 RX ADMIN — SODIUM CHLORIDE 500 ML: 0.9 INJECTION, SOLUTION INTRAVENOUS at 17:27

## 2024-01-29 NOTE — DISCHARGE INSTRUCTIONS
You were evaluated in the emergency department for: Abdominal and chest pain. You can access your current and pending results through [facility]. A radiologist will take a second look at your X-Rays, if you had any, and you will be contacted with any new findings.     You should follow-up with your primary care provider, as soon as possible, for re-evaluation.  If you do not have a primary care provider, I have referred you to [facility]. You will be contacted about scheduling an appointment. Their phone number is also included on this paperwork.  You have also been referred to gastroenterology and you should follow-up with them as well.    You may take 650mg of tylenol every four to six hours, not exceeding 3,000mg daily, for the management of your discomfort.     Your workup revealed no emergent features at this time; however, many disease processes are dynamic:    Please, return to the emergency department if you experience new or worsening symptoms, fever, chest pain, shortness of breath, difficulty breathing, dizziness, abdominal pain, persistent nausea/vomiting, syncope or passing out, blood in your urine or stool, coughing up blood, leg swelling/pain, urinary retention, bowel or bladder incontinence, numbness between your legs.    Additionally, your blood pressure was measured to be high. This is something that you should discuss with your primary care provider and have re-checked within one week.

## 2024-01-29 NOTE — ED PROVIDER NOTES
History  Chief Complaint   Patient presents with    Abdominal Pain     Pt presents to the ED from home with reports of left sided abd pain with radiation to the flank and back. Reports on Friday starting vomiting 0230. Was told she had a kidney stone, continues to note N/V, decreased PO intake.      Patient is a 48-year-old female, with a history significant for hysterectomy oophorectomy, factor V Leiden, diverticular disease, who presents to the ED today due to a 4-day history of left-sided flank pain.  Patient states that she has had intermittent pain in this location for some time but, 4 days ago, there was a sudden exacerbation. Patient also reports new right lower quadrant pain since this occurred as well as dark tarry stools.  Patient also describes chest pain (nonexertional) without associated dyspnea as well as nausea/vomiting.  Patient also describes dysuria.  Patient has taken ibuprofen to remit her symptoms.  Per my review of the medical record, patient had CT imaging on 1/19/24 that was notable for renal stone.  Patient denies fever, polyuria, dyspnea, rash, weakness, numbness, history of VTE, contraceptive use/hormonal use, recent trauma or surgery, hemoptysis, leg pain.  Patient's , present in room and providing collateral history, states that patient is not confused.  Patient is without other concerns at this time.        Prior to Admission Medications   Prescriptions Last Dose Informant Patient Reported? Taking?   Red Yeast Rice 600 MG TABS  Self Yes No   Sig: Take by mouth   Spacer/Aero Chamber Mouthpiece (SPACER DEVICE) for metered dose inhaler  Self No No   Sig: For use with metered dose inhaler. [facility]   albuterol (ProAir HFA) 90 mcg/act inhaler  Self No No   Sig: Inhale 2 puffs every 4 (four) hours as needed for wheezing or shortness of breath   calcium carbonate (OS-MELISSA) 1250 (500 Ca) MG tablet  Self No No   Sig: Take 1 tablet (1,250 mg total) by mouth daily    ergocalciferol (VITAMIN D2) 50,000 units  Self No No   Sig: TAKE 1 CAPSULE BY MOUTH ONE TIME PER WEEK   psyllium (METAMUCIL) 0.52 g capsule  Self Yes No   Sig: Take 0.52 g by mouth in the morning.   sertraline (ZOLOFT) 50 mg tablet   No No   Sig: Take 1 tablet (50 mg total) by mouth daily      Facility-Administered Medications: None       Past Medical History:   Diagnosis Date    Asthma 01/1993    Complex partial epilepsy (HCC)     Seisure free since age 18     Diverticulitis of colon 11/92    Epilepsy (HCC)     Factor 5 Leiden mutation, heterozygous (HCC)     Fracture, patella     Lupus (HCC) 3/29/2002    Pregnancy     Resulted in 1 Living child     Seizures (HCC) 11/92    TIA (transient ischemic attack) 1/1/93       Past Surgical History:   Procedure Laterality Date    ABDOMINAL ADHESION SURGERY N/A 09/29/2023    Procedure: LYSIS ADHESIONS;  Surgeon: Maite Maldonado MD;  Location: [facility];  Service: Gynecology    BACK SURGERY      BREAST EXCISIONAL BIOPSY Left 02/14/2004    benign    HYSTERECTOMY  9/29/23    MAMMO STEREOTACTIC BREAST BIOPSY LEFT (ALL INC) Left 03/08/2023    NASAL SEPTUM SURGERY      Deviation Repair     MT CYSTOURETHROSCOPY N/A 09/29/2023    Procedure: CYSTOSCOPY;  Surgeon: Maite Maldonado MD;  Location: [facility];  Service: Gynecology    MT LAPS TOTAL HYSTERECT 250 GM/< W/RMVL TUBE/OVARY Bilateral 09/29/2023    Procedure: HYSTERECTOMY LAPAROSCOPIC TOTAL  WITH BILATERAL SALPINGO-OOPHERECTOMY;  Surgeon: Maite Maldonado MD;  Location: [facility];  Service: Gynecology    RHINOPLASTY         Family History   Problem Relation Age of Onset    Liver cancer Mother         age dx unknown    Colon cancer Mother 74    Cancer Mother         Liver and lung cancer    Heart attack Father     Hypertension Father     Heart disease Father     No Known Problems Maternal Grandmother     No Known Problems Maternal Grandfather     Breast cancer Paternal Grandmother         age dx unknown    Colon cancer  Paternal Grandmother         age dx unknown    Stomach cancer Paternal Grandfather         age dx unknown    Multiple myeloma Brother     No Known Problems Brother     No Known Problems Brother     Breast cancer Maternal Aunt         age dx unknown    No Known Problems Maternal Aunt     No Known Problems Maternal Aunt     No Known Problems Paternal Aunt     No Known Problems Son     Ovarian cancer Neg Hx     Uterine cancer Neg Hx     Cervical cancer Neg Hx      I have reviewed and agree with the history as documented.    E-Cigarette/Vaping    E-Cigarette Use Never User      E-Cigarette/Vaping Substances    Nicotine No     THC No     CBD No     Flavoring No     Other No     Unknown No      Social History     Tobacco Use    Smoking status: Never    Smokeless tobacco: Never   Vaping Use    Vaping status: Never Used   Substance Use Topics    Alcohol use: Yes     Alcohol/week: 7.0 standard drinks of alcohol     Types: 5 Glasses of wine, 2 Shots of liquor per week     Comment: glass wine/day    Drug use: Never       Review of Systems   Constitutional:  Negative for fever.   HENT:  Negative for trouble swallowing.    Eyes:  Negative for visual disturbance.   Respiratory:  Negative for shortness of breath.    Cardiovascular:  Positive for chest pain.   Gastrointestinal:  Positive for abdominal pain, nausea and vomiting.   Endocrine: Negative for polyuria.   Genitourinary:  Negative for dysuria.   Musculoskeletal:  Negative for gait problem.   Skin:  Negative for rash.   Allergic/Immunologic: Positive for environmental allergies.   Neurological:  Negative for weakness and numbness.   Hematological:  Negative for adenopathy.   Psychiatric/Behavioral:  Negative for confusion.    All other systems reviewed and are negative.      Physical Exam  Physical Exam  Vitals and nursing note reviewed. Exam conducted with a chaperone present.   Constitutional:       General: She is not in acute distress.     Appearance: She is not  toxic-appearing or diaphoretic.   HENT:      Head: Normocephalic and atraumatic.      Right Ear: External ear normal.      Left Ear: External ear normal.      Nose: Nose normal. No rhinorrhea.      Mouth/Throat:      Mouth: Mucous membranes are moist.      Pharynx: Oropharynx is clear. No oropharyngeal exudate or posterior oropharyngeal erythema.      Comments: Uvula midline. No oropharyngeal or submandibular mass/swelling  Eyes:      General: No scleral icterus.        Right eye: No discharge.         Left eye: No discharge.      Conjunctiva/sclera: Conjunctivae normal.      Pupils: Pupils are equal, round, and reactive to light.   Neck:      Comments: Patient is spontaneously rotating their neck to the left and right during the history and physical exam interaction without difficulty or apparent discomfort    Cardiovascular:      Rate and Rhythm: Normal rate and regular rhythm.      Pulses: Normal pulses.      Heart sounds: Normal heart sounds. No murmur heard.     No friction rub. No gallop.      Comments: 2+ Radial  Pulmonary:      Effort: Pulmonary effort is normal. No respiratory distress.      Breath sounds: Normal breath sounds. No stridor. No wheezing, rhonchi or rales.   Abdominal:      General: Abdomen is flat. There is no distension.      Palpations: Abdomen is soft.      Tenderness: There is abdominal tenderness. There is guarding (Right lower quadrant, left upper quadrant). There is no right CVA tenderness, left CVA tenderness or rebound.   Genitourinary:     Rectum: Normal. Guaiac result negative.   Musculoskeletal:         General: No tenderness (No pain with calf squeeze) or deformity.      Cervical back: Neck supple. No tenderness. No muscular tenderness.      Right lower leg: No edema.      Left lower leg: No edema.   Lymphadenopathy:      Cervical: No cervical adenopathy.   Skin:     General: Skin is warm and dry.      Capillary Refill: Capillary refill takes less than 2 seconds.   Neurological:       Mental Status: She is alert.      Comments: Patient is speaking clearly in complete sentences.  Patient is answering appropriately and able follow commands.  Patient is moving all four extremities spontaneously.  No facial droop.  Tongue midline.      Psychiatric:         Mood and Affect: Mood normal.         Behavior: Behavior normal.      Comments: Anxious affect         Vital Signs  ED Triage Vitals [01/29/24 1218]   Temperature Pulse Respirations Blood Pressure SpO2   98 °F (36.7 °C) 91 18 (!) 194/97 98 %      Temp Source Heart Rate Source Patient Position - Orthostatic VS BP Location FiO2 (%)   Oral Monitor Sitting Right arm --      Pain Score       --           Vitals:    01/29/24 1218   BP: (!) 194/97   Pulse: 91   Patient Position - Orthostatic VS: Sitting         Visual Acuity      ED Medications  Medications   droperidol (INAPSINE) injection 0.625 mg (has no administration in time range)   sucralfate (CARAFATE) tablet 1 g (has no administration in time range)       Diagnostic Studies  Results Reviewed       Procedure Component Value Units Date/Time    UA w Reflex to Microscopic w Reflex to Culture [[id number]] Collected: 01/29/24 1659    Lab Status: Final result Specimen: Urine, Clean Catch Updated: 01/29/24 1718     Color, UA Colorless     Clarity, UA Clear     Specific Gravity, UA 1.009     pH, UA 5.0     Leukocytes, UA Negative     Nitrite, UA Negative     Protein, UA Negative mg/dl      Glucose, UA Negative mg/dl      Ketones, UA Negative mg/dl      Urobilinogen, UA <2.0 mg/dl      Bilirubin, UA Negative     Occult Blood, UA Negative    CBC and differential [[id number]]     Lab Status: No result Specimen: Blood     Comprehensive metabolic panel [[id number]]     Lab Status: No result Specimen: Blood     HS Troponin 0hr (reflex protocol) [[id number]]     Lab Status: No result Specimen: Blood                    XR chest 1 view portable   ED Interpretation by Dread Beltran MD (01/29 1650)   Per my  independent interpretation: No acute cardiopulmonary process      CT abdomen pelvis with contrast    (Results Pending)              Procedures  Procedures         ED Course  ED Course as of 01/29/24 1719   Mon Jan 29, 2024   1656 ECG per my independent interpretation: Normal rate, regular rhythm, no ectopy, normal axis, no ST elevations or depressions     1719 Patient signed out prior to final disposition             HEART Risk Score      Flowsheet Row Most Recent Value   Heart Score Risk Calculator    History 0 Filed at: 01/29/2024 1715   ECG 0 Filed at: 01/29/2024 1715   Age 1 Filed at: 01/29/2024 1715   Risk Factors 1 Filed at: 01/29/2024 1715   Troponin --   HEART Score --                  PERC Rule for PE      Flowsheet Row Most Recent Value   PERC Rule for PE    Age >=50 0 Filed at: 01/29/2024 1706   HR >=100 0 Filed at: 01/29/2024 1706   O2 Sat on room air < 95% 0 Filed at: 01/29/2024 1706   History of PE or DVT 0 Filed at: 01/29/2024 1706   Recent trauma or surgery 0 Filed at: 01/29/2024 1706   Hemoptysis 0 Filed at: 01/29/2024 1706   Exogenous estrogen 0 Filed at: 01/29/2024 1706   Unilateral leg swelling 0 Filed at: 01/29/2024 1706   PERC Rule for PE Results 0 Filed at: 01/29/2024 1706                    Wells' Criteria for PE      Flowsheet Row Most Recent Value   Wells' Criteria for PE    Clinical signs and symptoms of DVT 0 Filed at: 01/29/2024 1706   PE is primary diagnosis or equally likely 0 Filed at: 01/29/2024 1706   HR >100 0 Filed at: 01/29/2024 1706   Immobilization at least 3 days or Surgery in the previous 4 weeks 0 Filed at: 01/29/2024 1706   Previous, objectively diagnosed PE or DVT 0 Filed at: 01/29/2024 1706   Hemoptysis 0 Filed at: 01/29/2024 1706   Malignancy with treatment within 6 months or palliative 0 Filed at: 01/29/2024 1706   Wells' Criteria Total 0 Filed at: 01/29/2024 1706                  Medical Decision Making  Patient is a 48-year-old female, with a history significant for  hysterectomy oophorectomy, factor V Leiden, diverticular disease, who presents to the ED today due to a 4-day history of left-sided flank pain.  Patient states that she has had intermittent pain in this location for some time but, 4 days ago, there was a sudden exacerbation. Patient also reports new right lower quadrant pain since this occurred as well as dark tarry stools.  Patient also describes chest pain (nonexertional) without associated dyspnea as well as nausea/vomiting.  Patient also describes dysuria.  Patient has taken ibuprofen to remit her symptoms.  Per my review of the medical record, patient had CT imaging on 1/19/24 that was notable for renal stone.  Patient denies fever, polyuria, dyspnea, rash, weakness, numbness, history of VTE, contraceptive use/hormonal use, recent trauma or surgery, hemoptysis, leg pain.  Patient's , present in room and providing collateral history, states that patient is not confused.  Patient is currently afebrile and hemodynamically stable.  Her physical exam is notable for anxious appearance, tenderness to palpation of the left upper quadrant and right lower quadrant with guarding.  Heart lungs clear to auscultation, no lower extremity edema no pain with calf squeeze.  Guaiac negative stool.  This presentation is concerning for: ACS, ureterolithiasis, bowel obstruction, anxiety, viral syndrome, gastritis, electrolyte abnormality, NELSON, pneumothorax.  Patient at risk for diverticular disease, UTI, pneumonia, appendicitis.  Will investigate with cardiac workup, CT imaging of the abdomen pelvis.  Will manage with droperidol, Carafate, GI referral.  Will manage further based on workup.    Amount and/or Complexity of Data Reviewed  Labs: ordered.  Radiology: ordered and independent interpretation performed.    Risk  Prescription drug management.             Disposition  Final diagnoses:   Acute abdominal pain   Acute chest pain   Elevated blood pressure reading   Nausea  and vomiting     Time reflects when diagnosis was documented in both MDM as applicable and the Disposition within this note       Time User Action Codes Description Comment    1/29/2024  4:09 PM Dread Beltran Add [R10.9] Acute abdominal pain     1/29/2024  4:09 PM Dread Beltran Add [R07.9] Acute chest pain     1/29/2024  4:09 PM Dread Beltran Add [R03.0] Elevated blood pressure reading     1/29/2024  5:11 PM Dread Beltran Add [R11.2] Nausea and vomiting           ED Disposition       None          Follow-up Information       Follow up With Specialties Details Why Contact Info Additional Information    Kodi Mcgowan MD Family Medicine Schedule an appointment as soon as possible for a visit   [address] [place] [id number]  [phone]       [facility] Schedule an appointment as soon as possible for a visit   [address] [phone]  [phone] [facility], [address], [address], [phone]            Patient's Medications   Discharge Prescriptions    ONDANSETRON (ZOFRAN) 4 MG TABLET    Take 1 tablet (4 mg total) by mouth every 8 (eight) hours as needed for nausea or vomiting for up to 12 doses       Start Date: 1/29/2024 End Date: --       Order Dose: 4 mg       Quantity: 12 tablet    Refills: 0    SUCRALFATE (CARAFATE) 1 G TABLET    Take 1 tablet (1 g total) by mouth 4 (four) times a day as needed (Abdominal pain) for up to 5 doses       Start Date: 1/29/2024 End Date: --       Order Dose: 1 g       Quantity: 5 tablet    Refills: 0           PDMP Review         Value Time User    PDMP Reviewed  Yes 5/12/2023 12:39 PM Kodi Mcgowan MD            ED Provider  Electronically Signed by             Dread Beltran MD  01/29/24 2945

## 2024-01-29 NOTE — TELEPHONE ENCOUNTER
"Rec'd VM from patient \"Hi this is Jyoti Cedeno. My birth date is [birth year] and I was just calling about my CAT scan that I had of the 19th and it says I have a kidney stone. And on Friday morning I started throwing up that I'm having pain on my left side into my jose. If you could give me a call back, my number is [phone]. I called my General practitioner and she said to go to the emergency room. So I'm going to head there now, but I just wanted to she said to check in with you before I left. All right, I'm going to [facility]. Thank you. Have a good day. Estefani.\"  "

## 2024-01-29 NOTE — TELEPHONE ENCOUNTER
Called patient back and left her a VM that she is in the right place going to the ED and to call back if she has any other questions/concerns.

## 2024-01-29 NOTE — TELEPHONE ENCOUNTER
Regarding: pain back side, possible kidney stone  ----- Message from Melissa Franks sent at 1/29/2024  9:42 AM EST -----  Bawte message. Can we triage. Thank you    Good morning, I received the results from my test on Friday. On Friday morning, I woke up vomiting and had pain on my left side. I thought it was a stomach bug. Then I received the results from the CT scan. Since then, I've had pain on my back on the left side from just below my shoulder blade down to the inside of my upper thigh. I've been drinking water only and a sandwich each day. I have pain above my pubic bone when I pee. Could I be passing a kidney stone?

## 2024-01-29 NOTE — TELEPHONE ENCOUNTER
Triaged- c/o LLQ pain, chills, vomiting Saturday, hurts to stand , sit and walk. Recent CT scan, abnormal ,  3mm non obstructing left renal calculus on 1/19/2024. Advised Emergency room for further work up . She will also call the ordering doctor of the Ct scan to inform them or our advisement

## 2024-01-29 NOTE — TELEPHONE ENCOUNTER
"Reason for Disposition  • SEVERE abdominal pain (e.g., excruciating)    Answer Assessment - Initial Assessment Questions  1. LOCATION: \"Where does it hurt?\"       Left lower quadrant abdominal pain  2. RADIATION: \"Does the pain shoot anywhere else?\" (e.g., chest, back)      Left hip radiating down to left groin  3. ONSET: \"When did the pain begin?\" (e.g., minutes, hours or days ago)       Saturday   4. SUDDEN: \"Gradual or sudden onset?\"      Saturday   5. PATTERN \"Does the pain come and go, or is it constant?\"     - If constant: \"Is it getting better, staying the same, or worsening?\"       (Note: Constant means the pain never goes away completely; most serious pain is constant and it progresses)      - If intermittent: \"How long does it last?\" \"Do you have pain now?\"      (Note: Intermittent means the pain goes away completely between bouts)      Progressively worse through the day   6. SEVERITY: \"How bad is the pain?\"  (e.g., Scale 1-10; mild, moderate, or severe)    - MILD (1-3): doesn't interfere with normal activities, abdomen soft and not tender to touch     - MODERATE (4-7): interferes with normal activities or awakens from sleep, tender to touch     - SEVERE (8-10): excruciating pain, doubled over, unable to do any normal activities       9-severe  7. RECURRENT SYMPTOM: \"Have you ever had this type of stomach pain before?\" If Yes, ask: \"When was the last time?\" and \"What happened that time?\"       Yes, but feels different   8. CAUSE: \"What do you think is causing the stomach pain?\"      Possible kidney stone, or Diverticulitis   9. RELIEVING/AGGRAVATING FACTORS: \"What makes it better or worse?\" (e.g., movement, antacids, bowel movement)      When sleeping , the pain diminishes   10. OTHER SYMPTOMS: \"Has there been any vomiting, diarrhea, constipation, or urine problems?\"        Vomiting , dull headache, chills   11. PREGNANCY: \"Is there any chance you are pregnant?\" \"When was your last menstrual period?\"      "   N/a    Protocols used: Abdominal Pain - Female-ADULT-[place]

## 2024-01-30 LAB
ATRIAL RATE: 73 BPM
P AXIS: 67 DEGREES
PR INTERVAL: 146 MS
QRS AXIS: 63 DEGREES
QRSD INTERVAL: 84 MS
QT INTERVAL: 382 MS
QTC INTERVAL: 420 MS
T WAVE AXIS: 55 DEGREES
VENTRICULAR RATE: 73 BPM

## 2024-02-22 NOTE — PROGRESS NOTES
Whit Lu Donermacher  1975      CC:  Yearly exam    S:  55 y o  female here for yearly exam  Her cycles are regular, not heavy or crampy  She complains of a right breast lump that she noticed a couple weeks ago  It is somewhat tender but she also notices generalized bilateral breast tenderness  Sexual activity: She is "not really" sexually active with her   She stopped Depo this past fall when she was not really sexually active with her  due to stress, COVID, busy with work/life  Contraception: She uses nothing for contraception  Her mother was just diagnosed this past week with liver cancer which they suspect is metastasized from somewhere else; she has further testing this week  Last Pap 1/8/19 neg/neg  Last Mammo 5/5/17 neg    We reviewed [facility] guidelines for Pap testing today       Family hx of breast cancer: PGM, maternal aunt  Family hx of ovarian cancer: no  Family hx of colon cancer: father, PGM  Family hx of liver cancer - mom - they suspect this is a metastasis from somewhere else, maybe colon      Current Outpatient Medications:     albuterol (ProAir HFA) 90 mcg/act inhaler, Inhale 2 puffs every 4 (four) hours as needed for wheezing or shortness of breath, Disp: 18 g, Rfl: 3    amoxicillin (AMOXIL) 500 mg capsule, Take 1 capsule (500 mg total) by mouth every 8 (eight) hours for 10 days She can take amox, Disp: 30 capsule, Rfl: 0    calcium carbonate (OS-MELISSA) 1250 (500 Ca) MG tablet, Take 1 tablet (1,250 mg total) by mouth daily, Disp: 150 tablet, Rfl: 3    Cholecalciferol (VITAMIN D PO), Take by mouth, Disp: , Rfl:     meclizine (ANTIVERT) 12 5 MG tablet, Take 1 tablet (12 5 mg total) by mouth every 8 (eight) hours as needed for dizziness, Disp: 30 tablet, Rfl: 0    naproxen (NAPROSYN) 500 mg tablet, Take 1 tablet (500 mg total) by mouth 2 (two) times a day with meals, Disp: 60 tablet, Rfl: 3    psyllium (METAMUCIL) 0 52 g capsule, Take 0 52 g by mouth daily, Disp: , Rfl:     Red Yeast Rice 600 MG TABS, Take by mouth, Disp: , Rfl:     Spacer/Aero Chamber Mouthpiece (SPACER DEVICE) for metered dose inhaler, For use with metered dose inhaler  Diamond Hernandez VHC, Disp: 1 Device, Rfl: 0  Social History     Socioeconomic History    Marital status: /Civil Union     Spouse name: Not on file    Number of children: Not on file    Years of education: Not on file    Highest education level: Not on file   Occupational History    Not on file   Social Needs    Financial resource strain: Not on file    Food insecurity     Worry: Not on file     Inability: Not on file   [employer] needs     Medical: Not on file     Non-medical: Not on file   Tobacco Use    Smoking status: Never Smoker    Smokeless tobacco: Never Used   Substance and Sexual Activity    Alcohol use:  Yes     Alcohol/week: 2 0 standard drinks     Types: 2 Shots of liquor per week     Frequency: 4 or more times a week     Drinks per session: 1 or 2     Binge frequency: Never    Drug use: No    Sexual activity: Not Currently     Partners: Male     Birth control/protection: Injection     Comment:    Lifestyle    Physical activity     Days per week: Not on file     Minutes per session: Not on file    Stress: Not on file   Relationships    Social connections     Talks on phone: Not on file     Gets together: Not on file     Attends Christianity service: Not on file     Active member of club or organization: Not on file     Attends meetings of clubs or organizations: Not on file     Relationship status: Not on file    Intimate partner violence     Fear of current or ex partner: Not on file     Emotionally abused: Not on file     Physically abused: Not on file     Forced sexual activity: Not on file   Other Topics Concern    Not on file   Social History Narrative    Not on file     Family History   Problem Relation Age of Onset    Liver cancer Mother     Heart attack Father     Colon cancer Father     Hypertension Father     Heart disease Father     Breast cancer Paternal Grandmother     Colon cancer Paternal Grandmother     Stomach cancer Paternal Grandfather     Breast cancer Maternal Aunt     No Known Problems Brother     No Known Problems Brother     No Known Problems Brother     Ovarian cancer Neg Hx     Uterine cancer Neg Hx     Cervical cancer Neg Hx       Past Medical History:   Diagnosis Date    Complex partial epilepsy ([facility] [address] )     Seisure free since age 25     Epilepsy ([facility] [address] )     [facility], [facility]     Fracture, patella     Pregnancy     Resulted in 1 Living child         Review of Systems   Respiratory: Negative  Cardiovascular: Negative  Gastrointestinal: Negative for constipation and diarrhea  Genitourinary: Negative for difficulty urinating, pelvic pain, vaginal bleeding, vaginal discharge, itching or odor  O:  Blood pressure 144/82, height 4' 11 06" (1 5 m), weight 95 8 kg (211 lb 3 2 oz), last menstrual period 04/20/2021, not currently breastfeeding  Patient appears well and is not in distress  Neck is supple without masses  Breasts: left is tender throughout  Right is tender throughout  Small nodular mass at 4 o'clock, just outside the areola  Abdomen is soft and nontender without masses  External genitals are normal without lesions or rashes  Urethral meatus and urethra are normal  Bladder is normal to palpation  Vagina is normal without discharge or bleeding  Cervix is normal without discharge or lesion  Uterus is normal, mobile, nontender without palpable mass  Adnexa are normal, nontender, without palpable mass  A:   Yearly exam     Right breast lump    P:   Pap 2024     Check bilateral dx mammo, right breast uS    Colonoscopy recommended, referral placed    RTO one year for yearly exam or sooner as needed  [de-identified] : NEW CONSULT FOR:Epigastric abdominal pain, vomiting, dysphagia and chest pain.  His symptoms are worse with meals however there is no relationship to specific foods.  When eating solids he will have difficulty and pain with swallowing.  Vomiting improves the pain.  He has a stool several times a week.  His stools are described as large and hard.  He strains in order to defecate.  There is no blood noted in his stool.  There is no history of weight loss or diarrhea. With his history of asthma, eczema and food allergies possible eosinophilic esophagitis needs to be considered  ONSET: His symptoms began years ago  AGGRAVATING FACTORS: Eating makes the symptoms worse  ALLEVIATING FACTORS: Vomiting improves the abdominal pain  PERTINENT NEGATIVES: No cough or fever  INDEPENDENT HISTORIAN: Mother  PROCEDURE ORDERED: Upper endoscopy  PRESCRIPTION MEDICATION MANAGEMENT; Prescription for Miralax was sent to the pharmacy

## 2024-05-08 ENCOUNTER — OFFICE VISIT (OUTPATIENT)
Dept: FAMILY MEDICINE CLINIC | Facility: CLINIC | Age: 49
End: 2024-05-08
Payer: COMMERCIAL

## 2024-05-08 VITALS
SYSTOLIC BLOOD PRESSURE: 128 MMHG | WEIGHT: 219.8 LBS | HEIGHT: 59 IN | HEART RATE: 102 BPM | TEMPERATURE: 97.7 F | OXYGEN SATURATION: 99 % | DIASTOLIC BLOOD PRESSURE: 86 MMHG | BODY MASS INDEX: 44.31 KG/M2

## 2024-05-08 DIAGNOSIS — G62.9 NEUROPATHY: ICD-10-CM

## 2024-05-08 DIAGNOSIS — M62.838 MUSCLE SPASM: ICD-10-CM

## 2024-05-08 DIAGNOSIS — E55.9 VITAMIN D DEFICIENCY: ICD-10-CM

## 2024-05-08 DIAGNOSIS — R73.9 HYPERGLYCEMIA: ICD-10-CM

## 2024-05-08 DIAGNOSIS — M54.9 UPPER BACK PAIN: ICD-10-CM

## 2024-05-08 DIAGNOSIS — E78.5 DYSLIPIDEMIA: ICD-10-CM

## 2024-05-08 DIAGNOSIS — J45.909 ASTHMA, UNSPECIFIED ASTHMA SEVERITY, UNSPECIFIED WHETHER COMPLICATED, UNSPECIFIED WHETHER PERSISTENT: Primary | ICD-10-CM

## 2024-05-08 PROCEDURE — 99214 OFFICE O/P EST MOD 30 MIN: CPT | Performed by: FAMILY MEDICINE

## 2024-05-08 RX ORDER — GABAPENTIN 100 MG/1
100 CAPSULE ORAL
Qty: 90 CAPSULE | Refills: 1 | Status: SHIPPED | OUTPATIENT
Start: 2024-05-08

## 2024-05-08 RX ORDER — CYCLOBENZAPRINE HCL 5 MG
2.5 TABLET ORAL 3 TIMES DAILY PRN
Qty: 30 TABLET | Refills: 0 | Status: SHIPPED | OUTPATIENT
Start: 2024-05-08

## 2024-05-08 NOTE — PROGRESS NOTES
Name: Jyoti Cedeno      : 1975      MRN: [id number]  Encounter Provider: Kodi Mcgowan MD  Encounter Date: 2024   Encounter department: [facility]    Assessment & Plan     1. Asthma, unspecified asthma severity, unspecified whether complicated, unspecified whether persistent    2. Vitamin D deficiency    3. Hyperglycemia    4. Dyslipidemia    5. Neuropathy  -     gabapentin (NEURONTIN) 100 mg capsule; Take 1 capsule (100 mg total) by mouth daily at bedtime    6. Upper back pain  -     cyclobenzaprine (FLEXERIL) 5 mg tablet; Take 0.5 tablets (2.5 mg total) by mouth 3 (three) times a day as needed for muscle spasms (with muscle spasm)    7. Muscle spasm      Asthma symptoms well-controlled, will have patient check  Blood work including lipid panel, vitamin D, hemoglobin A1c.  For neuropathy will have patient continue Zoloft 50 mg for now, after discussion with patient between increasing dosage of Zoloft versus new medication we will add gabapentin 100 mg at night for symptom.  Patient may move Zoloft for the morning to avoid excessive sedation.  For upper muscle pain/tightness she may try gentle stretching, if the pain is severe she may try half a tablet of Flexeril up to 3 times a day, do not drive while on this medication.  Per the patient's numbness and tingling of hands may be secondary to carpal tunnel syndrome will have patient try some exercises that may benefit both shoulder pain as well as carpal tunnel syndrome.         Subjective     HPI    49-year-old female patient presents for follow-up.  Patient was last seen 2023 for evaluation.  Overall patient reports she is doing well, no recent allergy flareups, denies any significant asthma exacerbation.  Patient has been experiencing some new upper back pain for which she sees chiropractor.  Has not noticed any significant improvement after 3 weeks.  Interesting low-dose of muscle relaxant for symptomatic  relief.  Patient has also noticed some increasing numbness and tingling in her fingers and toes.  She was initially taking Zoloft 50 mg, this medication seems to be not working as effectively.      Review of Systems   Constitutional:  Negative for chills and fever.   HENT:  Negative for congestion, sinus pain and sore throat.    Respiratory:  Negative for chest tightness and shortness of breath.    Cardiovascular:  Negative for chest pain.   Gastrointestinal:  Negative for abdominal pain, constipation, diarrhea, nausea and vomiting.   Genitourinary:  Negative for dysuria.   Musculoskeletal:  Positive for back pain (upper back pain).   Allergic/Immunologic: Negative for environmental allergies.   Neurological:  Positive for numbness. Negative for dizziness, weakness, light-headedness and headaches.   Psychiatric/Behavioral:  Negative for sleep disturbance.        Past Medical History:   Diagnosis Date    Asthma 01/1993    Complex partial epilepsy (HCC)     Seisure free since age 18     Diverticulitis of colon 11/92    Epilepsy (HCC)     Factor 5 Leiden mutation, heterozygous (HCC)     Fracture, patella     Lupus (HCC) 3/29/2002    Pregnancy     Resulted in 1 Living child     Seizures ([facility]) 11/92    TIA (transient ischemic attack) 1/1/93     Past Surgical History:   Procedure Laterality Date    ABDOMINAL ADHESION SURGERY N/A 09/29/2023    Procedure: LYSIS ADHESIONS;  Surgeon: Maite Maldonado MD;  [facility];  Service: Gynecology    BACK SURGERY      BREAST EXCISIONAL BIOPSY Left 02/14/2004    benign    HYSTERECTOMY  9/29/23    MAMMO STEREOTACTIC BREAST BIOPSY LEFT (ALL INC) Left 03/08/2023    NASAL SEPTUM SURGERY      Deviation Repair     OK CYSTOURETHROSCOPY N/A 09/29/2023    Procedure: CYSTOSCOPY;  Surgeon: Maite Maldonado MD;  [facility];  Service: Gynecology    OK LAPS TOTAL HYSTERECT 250 GM/< W/RMVL TUBE/OVARY Bilateral 09/29/2023    Procedure: HYSTERECTOMY LAPAROSCOPIC TOTAL  WITH  BILATERAL SALPINGO-OOPHERECTOMY;  Surgeon: Maite Maldonado MD;  [facility];  Service: Gynecology    RHINOPLASTY       Family History   Problem Relation Age of Onset    Liver cancer Mother         age dx unknown    Colon cancer Mother 74    Cancer Mother         Liver and lung cancer    Heart attack Father     Hypertension Father     Heart disease Father     No Known Problems Maternal Grandmother     No Known Problems Maternal Grandfather     Breast cancer Paternal Grandmother         age dx unknown    Colon cancer Paternal Grandmother         age dx unknown    Stomach cancer Paternal Grandfather         age dx unknown    Multiple myeloma Brother     No Known Problems Brother     No Known Problems Brother     Breast cancer Maternal Aunt         age dx unknown    No Known Problems Maternal Aunt     No Known Problems Maternal Aunt     No Known Problems Paternal Aunt     No Known Problems Son     Ovarian cancer Neg Hx     Uterine cancer Neg Hx     Cervical cancer Neg Hx      Social History     Socioeconomic History    Marital status: /Civil Union     Spouse name: None    Number of children: None    Years of education: None    Highest education level: None   Occupational History    None   Tobacco Use    Smoking status: Never    Smokeless tobacco: Never   Vaping Use    Vaping status: Never Used   Substance and Sexual Activity    Alcohol use: Yes     Alcohol/week: 7.0 standard drinks of alcohol     Types: 5 Glasses of wine, 2 Shots of liquor per week     Comment: glass wine/day    Drug use: Never    Sexual activity: Yes     Partners: Male     Birth control/protection: Female Sterilization     Comment:    Other Topics Concern    None   Social History Narrative    None     Social Determinants of Health     Financial Resource Strain: Not on file   Food Insecurity: Not on file   Transportation Needs: Not on file   Physical Activity: Not on file   Stress: Not on file   Social Connections: Not on file    Intimate Partner Violence: Not on file   Housing Stability: Not on file     Current Outpatient Medications on File Prior to Visit   Medication Sig    albuterol (ProAir HFA) 90 mcg/act inhaler Inhale 2 puffs every 4 (four) hours as needed for wheezing or shortness of breath    calcium carbonate (OS-MELISSA) 1250 (500 Ca) MG tablet Take 1 tablet (1,250 mg total) by mouth daily    ergocalciferol (VITAMIN D2) 50,000 units TAKE 1 CAPSULE BY MOUTH ONE TIME PER WEEK    ondansetron (ZOFRAN) 4 mg tablet Take 1 tablet (4 mg total) by mouth every 8 (eight) hours as needed for nausea or vomiting for up to 12 doses    psyllium (METAMUCIL) 0.52 g capsule Take 0.52 g by mouth in the morning.    Red Yeast Rice 600 MG TABS Take by mouth    sertraline (ZOLOFT) 50 mg tablet Take 1 tablet (50 mg total) by mouth daily    Spacer/Aero Chamber Mouthpiece (SPACER DEVICE) for metered dose inhaler For use with metered dose inhaler. Optichamber Mary VHC    sucralfate (CARAFATE) 1 g tablet Take 1 tablet (1 g total) by mouth 4 (four) times a day as needed (Abdominal pain) for up to 5 doses     Allergies   Allergen Reactions    Carbamazepine Hives    Cephalexin Hives    Flurbiprofen Fatigue    Phenytoin Hives     Immunization History   Administered Date(s) Administered    COVID-19 PFIZER VACCINE 0.3 ML IM 04/25/2021, 05/16/2021, 12/26/2021    COVID-19 Pfizer mRNA vacc PF joey-sucrose 12 yr and older (Comirnaty) 11/19/2023    Hep B, Adolescent or Pediatric 09/15/2014, 10/15/2014, 04/18/2015    Hep B, adult 09/15/2014    INFLUENZA 09/26/2014, 09/19/2015, 09/24/2015, 11/16/2016, 10/12/2018, 10/10/2020, 12/02/2021, 11/19/2023    Influenza Injectable, MDCK, Preservative Free, Quadrivalent, 0.5 mL 11/19/2023    Influenza Quadrivalent Preservative Free 3 years and older IM 11/16/2016    Influenza, seasonal, injectable 09/28/2013, 09/19/2015, 09/24/2015    Influenza, seasonal, injectable, preservative free 09/26/2014    Tuberculin Skin Test-PPD  "Intradermal 01/27/2017       Objective     /86 (BP Location: Right arm, Patient Position: Sitting, Cuff Size: Standard)   Pulse 102   Temp 97.7 °F (36.5 °C)   Ht 4' 11\" (1.499 m)   Wt 99.7 kg (219 lb 12.8 oz)   LMP 08/21/2023 (Exact Date) Comment: denies preg  SpO2 99%   BMI 44.39 kg/m²     Physical Exam  Vitals reviewed.   Constitutional:       General: She is not in acute distress.     Appearance: Normal appearance. She is obese. She is not ill-appearing, toxic-appearing or diaphoretic.   Cardiovascular:      Rate and Rhythm: Normal rate and regular rhythm.      Pulses: Normal pulses.      Heart sounds: Normal heart sounds. No murmur heard.  Pulmonary:      Effort: Pulmonary effort is normal. No respiratory distress.      Breath sounds: Normal breath sounds.   Abdominal:      General: Abdomen is flat.   Musculoskeletal:         General: No swelling or deformity.      Comments: Positive Phalen sign, there is some tenseness in trapezius   Skin:     General: Skin is warm and dry.      Capillary Refill: Capillary refill takes less than 2 seconds.      Coloration: Skin is not jaundiced.   Neurological:      General: No focal deficit present.      Mental Status: She is alert.   Psychiatric:         Mood and Affect: Mood normal.         Kodi Mcgowan MD    "

## 2024-05-23 DIAGNOSIS — G62.9 NEUROPATHY: ICD-10-CM

## 2024-05-24 DIAGNOSIS — M54.9 UPPER BACK PAIN: ICD-10-CM

## 2024-05-24 RX ORDER — CYCLOBENZAPRINE HCL 5 MG
TABLET ORAL
Qty: 30 TABLET | Refills: 0 | Status: SHIPPED | OUTPATIENT
Start: 2024-05-24

## 2024-07-01 DIAGNOSIS — G62.9 NEUROPATHY: ICD-10-CM

## 2024-07-01 DIAGNOSIS — M54.9 UPPER BACK PAIN: ICD-10-CM

## 2024-07-01 RX ORDER — GABAPENTIN 100 MG/1
100 CAPSULE ORAL
Qty: 90 CAPSULE | Refills: 1 | Status: SHIPPED | OUTPATIENT
Start: 2024-07-01

## 2024-07-01 RX ORDER — CYCLOBENZAPRINE HCL 5 MG
5-10 TABLET ORAL 3 TIMES DAILY PRN
Qty: 30 TABLET | Refills: 0 | Status: SHIPPED | OUTPATIENT
Start: 2024-07-01

## 2024-08-03 ENCOUNTER — APPOINTMENT (OUTPATIENT)
Dept: RADIOLOGY | Age: 49
End: 2024-08-03
Payer: COMMERCIAL

## 2024-08-03 ENCOUNTER — OFFICE VISIT (OUTPATIENT)
Dept: URGENT CARE | Age: 49
End: 2024-08-03
Payer: COMMERCIAL

## 2024-08-03 VITALS
WEIGHT: 222 LBS | HEIGHT: 59 IN | BODY MASS INDEX: 44.76 KG/M2 | SYSTOLIC BLOOD PRESSURE: 110 MMHG | RESPIRATION RATE: 16 BRPM | HEART RATE: 98 BPM | OXYGEN SATURATION: 99 % | TEMPERATURE: 97.1 F | DIASTOLIC BLOOD PRESSURE: 62 MMHG

## 2024-08-03 DIAGNOSIS — G89.29 WRIST PAIN, CHRONIC, LEFT: ICD-10-CM

## 2024-08-03 DIAGNOSIS — M25.532 WRIST PAIN, CHRONIC, LEFT: ICD-10-CM

## 2024-08-03 DIAGNOSIS — S69.92XA INJURY OF LEFT WRIST, INITIAL ENCOUNTER: Primary | ICD-10-CM

## 2024-08-03 PROCEDURE — 73110 X-RAY EXAM OF WRIST: CPT

## 2024-08-03 PROCEDURE — G0383 LEV 4 HOSP TYPE B ED VISIT: HCPCS

## 2024-08-03 NOTE — PATIENT INSTRUCTIONS
Start OT.   Motrin or Tylenol for pain.   Continue brace for support.   Follow up with PCP in 3-5 days.  Proceed to  ER if symptoms worsen.    If tests are performed, our office will contact you with results only if changes need to made to the care plan discussed with you at the visit. You can review your full results on [facility] My[facility].

## 2024-08-03 NOTE — PROGRESS NOTES
[facility]        NAME: Jyoti Cedeno is a 49 y.o. female  : 1975    MRN: [id number]  DATE: August 3, 2024  TIME: 5:38 PM    Assessment and Plan   Injury of left wrist, initial encounter [S69.92XA]  1. Injury of left wrist, initial encounter  XR wrist 3+ vw left    Ambulatory Referral to Occupational Therapy        XR of left wrist reviewed, no acte fracture noted, pendign radiology review.     Patient Instructions     Start OT.   Motrin or Tylenol for pain.   Continue brace for support.   Follow up with PCP in 3-5 days.  Proceed to  ER if symptoms worsen.    If tests are performed, our office will contact you with results only if changes need to made to the care plan discussed with you at the visit. [facility]    Chief Complaint     Chief Complaint   Patient presents with    Wrist Injury     Pt had closed the lid to freezer onto left wrist approx two weeks ago and is still having pain and discomfort. Brace in place.          History of Present Illness       49-year-old female presenting for evaluation of left wrist pain.  Patient states that approximately 2 months ago she accidentally closed her left wrist injured her left wrist on the chest freezer door.  She states that she accidentally dropped the door of the freezer on her wrist.  Since the injury she has been wearing a compression brace and this is helping with the pain.  She denies any numbness or tingling or radiation of pain.  She is presenting today for evaluation as the pain is still persisting.        Review of Systems   Review of Systems   Constitutional:  Negative for chills and fever.   Musculoskeletal:  Positive for arthralgias.   Neurological:  Negative for weakness and numbness.   All other systems reviewed and are negative.        Current Medications       Current Outpatient Medications:     albuterol (ProAir HFA) 90 mcg/act inhaler, Inhale 2 puffs every 4 (four) hours as  needed for wheezing or shortness of breath, Disp: 18 g, Rfl: 3    calcium carbonate (OS-MELISSA) 1250 (500 Ca) MG tablet, Take 1 tablet (1,250 mg total) by mouth daily, Disp: 150 tablet, Rfl: 3    cyclobenzaprine (FLEXERIL) 5 mg tablet, Take 1-2 tablets (5-10 mg total) by mouth 3 (three) times a day as needed for muscle spasms, Disp: 30 tablet, Rfl: 0    ergocalciferol (VITAMIN D2) 50,000 units, TAKE 1 CAPSULE BY MOUTH ONE TIME PER WEEK, Disp: 12 capsule, Rfl: 0    gabapentin (NEURONTIN) 100 mg capsule, Take 1 capsule (100 mg total) by mouth daily at bedtime, Disp: 90 capsule, Rfl: 1    ondansetron (ZOFRAN) 4 mg tablet, Take 1 tablet (4 mg total) by mouth every 8 (eight) hours as needed for nausea or vomiting for up to 12 doses, Disp: 12 tablet, Rfl: 0    psyllium (METAMUCIL) 0.52 g capsule, Take 0.52 g by mouth in the morning., Disp: , Rfl:     Red Yeast Rice 600 MG TABS, Take by mouth, Disp: , Rfl:     Spacer/Aero Chamber Mouthpiece (SPACER DEVICE) for metered dose inhaler, For use with metered dose inhaler. Diamond Hernandez VHC, Disp: 1 Device, Rfl: 0    sertraline (ZOLOFT) 50 mg tablet, Take 1 tablet (50 mg total) by mouth daily (Patient not taking: Reported on 8/3/2024), Disp: 90 tablet, Rfl: 1    sucralfate (CARAFATE) 1 g tablet, Take 1 tablet (1 g total) by mouth 4 (four) times a day as needed (Abdominal pain) for up to 5 doses (Patient not taking: Reported on 8/3/2024), Disp: 5 tablet, Rfl: 0    Current Allergies     Allergies as of 08/03/2024 - Reviewed 08/03/2024   Allergen Reaction Noted    Carbamazepine Hives 02/25/2014    Cephalexin Hives 04/18/2013    Flurbiprofen Fatigue 04/18/2013    Phenytoin Hives 04/18/2013            The following portions of the patient's history were reviewed and updated as appropriate: allergies, current medications, past family history, past medical history, past social history, past surgical history and problem list.     Past Medical History:   Diagnosis Date    Asthma  01/1993    Complex partial epilepsy (HCC)     Seisure free since age 18     Diverticulitis of colon 11/92    Epilepsy (HCC)     Factor 5 Leiden mutation, heterozygous (HCC)     Fracture, patella     Lupus (HCC) 3/29/2002    Pregnancy     Resulted in 1 Living child     Seizures (HCC) 11/92    TIA (transient ischemic attack) 1/1/93       Past Surgical History:   Procedure Laterality Date    ABDOMINAL ADHESION SURGERY N/A 09/29/2023    Procedure: LYSIS ADHESIONS;  Surgeon: Maite Maldonado MD;  [facility];  Service: Gynecology    BACK SURGERY      BREAST EXCISIONAL BIOPSY Left 02/14/2004    benign    HYSTERECTOMY  9/29/23    MAMMO STEREOTACTIC BREAST BIOPSY LEFT (ALL INC) Left 03/08/2023    NASAL SEPTUM SURGERY      Deviation Repair     ME CYSTOURETHROSCOPY N/A 09/29/2023    Procedure: CYSTOSCOPY;  Surgeon: Maite Maldonado MD;  [facility];  Service: Gynecology    ME LAPS TOTAL HYSTERECT 250 GM/< W/RMVL TUBE/OVARY Bilateral 09/29/2023    Procedure: HYSTERECTOMY LAPAROSCOPIC TOTAL  WITH BILATERAL SALPINGO-OOPHERECTOMY;  Surgeon: Maite Maldonado MD;  [facility];  Service: Gynecology    RHINOPLASTY         Family History   Problem Relation Age of Onset    Liver cancer Mother         age dx unknown    Colon cancer Mother 74    Cancer Mother         Liver and lung cancer    Heart attack Father     Hypertension Father     Heart disease Father     No Known Problems Maternal Grandmother     No Known Problems Maternal Grandfather     Breast cancer Paternal Grandmother         age dx unknown    Colon cancer Paternal Grandmother         age dx unknown    Stomach cancer Paternal Grandfather         age dx unknown    Multiple myeloma Brother     No Known Problems Brother     No Known Problems Brother     Breast cancer Maternal Aunt         age dx unknown    No Known Problems Maternal Aunt     No Known Problems Maternal Aunt     No Known Problems Paternal Aunt     No Known Problems Son     Ovarian  "cancer Neg Hx     Uterine cancer Neg Hx     Cervical cancer Neg Hx          Medications have been verified.        Objective   /62   Pulse 98   Temp (!) 97.1 °F (36.2 °C)   Resp 16   Ht 4' 11\" (1.499 m)   Wt 101 kg (222 lb)   LMP 08/21/2023 (Exact Date) Comment: denies preg  SpO2 99%   BMI 44.84 kg/m²        Physical Exam     Physical Exam  Vitals and nursing note reviewed.   Constitutional:       General: She is not in acute distress.     Appearance: Normal appearance. She is normal weight. She is not ill-appearing, toxic-appearing or diaphoretic.   HENT:      Head: Normocephalic and atraumatic.   Eyes:      General:         Right eye: No discharge.         Left eye: No discharge.   Cardiovascular:      Rate and Rhythm: Normal rate.      Pulses: Normal pulses.   Pulmonary:      Effort: Pulmonary effort is normal.   Musculoskeletal:         General: Tenderness present. No swelling or signs of injury.      Comments: TTP radial aspect of left wrist, passive ROM WDL, positive Finkelstein.    Neurological:      Mental Status: She is alert.   Psychiatric:         Mood and Affect: Mood normal.         Behavior: Behavior normal.                   "

## 2024-08-13 ENCOUNTER — EVALUATION (OUTPATIENT)
Dept: PHYSICAL THERAPY | Facility: REHABILITATION | Age: 49
End: 2024-08-13
Payer: COMMERCIAL

## 2024-08-13 DIAGNOSIS — S69.92XA INJURY OF LEFT WRIST, INITIAL ENCOUNTER: ICD-10-CM

## 2024-08-13 PROCEDURE — 97110 THERAPEUTIC EXERCISES: CPT | Performed by: PHYSICAL THERAPIST

## 2024-08-13 PROCEDURE — 97162 PT EVAL MOD COMPLEX 30 MIN: CPT | Performed by: PHYSICAL THERAPIST

## 2024-08-13 NOTE — PROGRESS NOTES
PT Evaluation     Today's date: 2024  Patient name: Jyoti Cedeno  : 1975  MRN: [id number]  Referring provider: Ivanna Velez CR*  Dx:   Encounter Diagnosis     ICD-10-CM    1. Injury of left wrist, initial encounter  S69.92XA Ambulatory Referral to Occupational Therapy          Start Time: 1030  Stop Time: 1115  Total time in clinic (min): 45 minutes    Assessment  Impairments: abnormal coordination, abnormal or restricted ROM, activity intolerance, impaired physical strength, lacks appropriate home exercise program and pain with function    Assessment details: Pt is a pleasant 49 year-old right-handed female presenting to PT with 3 month history of left wrist pain. Pt presents with left forearm and wrist pain, decreased left wrist range of motion, decreased left wrist strength, and decreased tolerance to activity.  Pt presents with signs and symptoms consistent with left De Quervain's tenosynovitis and lateral epicondylitis. Pt is a good candidate for outpatient physical therapy and would benefit from skilled physical therapy to address limitations and to achieve goals. Thank you for this referral.   Understanding of Dx/Px/POC: good     Prognosis: good    Goals  Short-Term Goals (4 weeks)   1. Patient will decrease worst rating of pain by 25% to improve quality of life.  2. Patient will increase strength by 1/2 MMT to improve quality of life with improved efficiency of daily activities.  3. Patient will improve ROM by 25% indicating improved mobility of affected area.    Long-Term Goals (8 weeks)   1. Patient will decrease pain by 50% at worst in comparison to IE indicating significant reduction in pain and improved quality of life.  2. Patient will demonstrate strength WFL compared to IE levels indicating ability to independently manage pain symptoms to accomplish daily activities.   3. Patient will be independent with HEP with good form accomplished.      Plan  Patient would benefit  from: PT eval and skilled PT  Planned modality interventions: cryotherapy and thermotherapy: hydrocollator packs    Planned therapy interventions: IADL retraining, body mechanics training, flexibility, functional ROM exercises, home exercise program, neuromuscular re-education, manual therapy, postural training, strengthening, stretching, therapeutic activities, therapeutic exercise, joint mobilization and IASTM    Frequency: 2x week  Duration in weeks: 8  Treatment plan discussed with: patient        Subjective Evaluation    History of Present Illness  Mechanism of injury: Pt is a 49 year-old right-handed female presenting to PT with 3 month history of left wrist pain. She reports approximately 3 months ago she accidentally dropped the chest freezer door onto her left wrist in the chest freezer door.   Since the injury she has been wearing a compression brace which is helping slightly with the pain, however, her pain is persistent. Pt went to Urgent Care on 8/3/24, x-rays performed and negative for fracture. Pt referred to OPPT.     Pain Location: L medial wrist (radial side) into her thumb, lateral wrist    Pain Type: medial (sharp), lateral (clicking)    Pain Intensity:  Current: 6  Best: 4  Worst: 10    Pt reports increased pain and/or difficulty with: gripping, lifting, pulling up her pants, opening a jar, holding object in left hand, pulling motions (getting laundry out of washer). Pt reports sleep disturbance secondary to pain waking once/night.    Pt reports decreased pain with: wearing compression sleeve, rest, avoiding motion, Ibuprofen            Recurrent probem    Quality of life: good    Patient Goals  Patient goals for therapy: decreased pain, decreased edema, increased motion, increased strength and independence with ADLs/IADLs      Diagnostic Tests  X-ray: normal  Treatments  Previous treatment: medication        Objective     Observations     Left Wrist/Hand   Positive for edema.     Additional  "Observation Details  Mild left forearm and distal RU edema    Tenderness     Left Elbow   Tenderness in the lateral epicondyle.     Left Wrist/Hand   Tenderness in the common extensor tendon, lateral epicondyle and distal radioulnar joint.     Neurological Testing     Sensation     Wrist/Hand   Left   Intact: light touch    Right   Intact: light touch    Active Range of Motion     Left Wrist   Wrist flexion: 68 degrees with pain  Wrist extension: 36 degrees with pain  Radial deviation: 14 degrees with pain  Ulnar deviation: 34 degrees with pain      Right Wrist   Normal active range of motion    Left Thumb     Opposition: Opposition WNL    Right Thumb   Opposition: Opposition WNL    Strength/Myotome Testing     Left Wrist/Hand   Wrist extension: 3-  Wrist flexion: 3-  Radial deviation: 3-  Ulnar deviation: 3-    Right Wrist/Hand   Normal wrist strength    Tests     Left Elbow   Positive Cozen's.     Left Wrist/Hand   Positive Finkelstein's and resisted middle finger.   Negative Phalen's sign.              Precautions:   Patient Active Problem List   Diagnosis    Asthma    Classic migraine with aura    Dyslipidemia    Factor V Leiden mutation (HCC)    Hyperglycemia    Morbid obesity (HCC)    Vitamin D deficiency    Family history of cancer    S/P total hysterectomy and BSO (bilateral salpingo-oophorectomy)    Monoallelic mutation of MITF gene           Daily Treatment Diary      Assessment  8/13                     Eval/Revronald SZYMANSKI                     FOTO  39/61       **         **   Manuals    STM L Wrist Extensors/CET  NV                      Graston L Wrist Extensors  NV             L Wrist Flexor/Extensor Stretching  NV            Prescribed POC    Pt Education  MD                      HEP Issued/Updated  MD CARTAGENA Wrist Flexors Stretch with Elbow Extended  10\"x3                      L Wrist Extensors Stretch with Elbow Extended  10\"x3                      L Biceps Stretch @ Wall  NV                "       Corner Pec Stretch  NV                                                                                                                      Goal Oriented Functional Activity:                                                Modalities    MHP L Forearm/Wrist   Pre-Tx                       CP L Forearm/Wrist   Post- Tx             QR Code:    [facility] HEP:  Access Code: [email or web address]  URL: [email or web address]  Date: 08/13/2024  Prepared by: Kathy Rutledge    Exercises  - Standing Wrist Extension Stretch  - 2-3 x daily - 3 reps - 10 hold  - Standing Wrist Flexion Stretch  - 2-3 x daily - 3 reps - 10 hold    Patient Education  - Office Posture  - What Is De Quervain's Tenosynovitis?  - Ice  - Ice Massage

## 2024-08-16 ENCOUNTER — OFFICE VISIT (OUTPATIENT)
Dept: PHYSICAL THERAPY | Facility: REHABILITATION | Age: 49
End: 2024-08-16
Payer: COMMERCIAL

## 2024-08-16 DIAGNOSIS — S69.92XA INJURY OF LEFT WRIST, INITIAL ENCOUNTER: Primary | ICD-10-CM

## 2024-08-16 PROCEDURE — 97110 THERAPEUTIC EXERCISES: CPT

## 2024-08-16 PROCEDURE — 97140 MANUAL THERAPY 1/> REGIONS: CPT

## 2024-08-16 NOTE — PROGRESS NOTES
"Daily Note     Today's date: 2024  Patient name: Jyoti Cedeno  : 1975  MRN: [id number]  Referring provider: Ivanna Velez CR*  Dx:   Encounter Diagnosis     ICD-10-CM    1. Injury of left wrist, initial encounter  S69.92XA           Start Time: 1115  Stop Time: 1200  Total time in clinic (min): 45 minutes    Subjective: Pt reports overall she is doing well, has been compliant with HEP, a little \"sore\"    Objective: See treatment diary below      Assessment: Tolerated treatment well. Pt present to therapy with brace on L wrist. Session focused on STM and IASTM to L wrist extensors to release soft tissue tension, address deficits and improve symptoms.  TTP noted to proximal, mid extensor and lateral to thumb. Patient did well with no adverse effects noted, slight redness post treatment. Assess symptoms for nv. Incorporated additional stretches per PT POC with good tolerance. Patient would benefit from continued PT      Plan: Continue per plan of care.      Precautions:   Patient Active Problem List   Diagnosis    Asthma    Classic migraine with aura    Dyslipidemia    Factor V Leiden mutation (HCC)    Hyperglycemia    Morbid obesity (HCC)    Vitamin D deficiency    Family history of cancer    S/P total hysterectomy and BSO (bilateral salpingo-oophorectomy)    Monoallelic mutation of MITF gene           Daily Treatment Diary      Assessment                     Jn/Reval  MD                     FOTO  39/61       **         **   Manuals    STM L Wrist Extensors/CET  NV  JM                    Graston L Wrist Extensors  NV JM            L Wrist Flexor/Extensor Stretching  NV            Prescribed POC    Pt Education  MD                      HEP Issued/Updated  MD CARTAGENA Wrist Flexors Stretch with Elbow Extended  10\"x3  10\" x3                      L Wrist Extensors Stretch with Elbow Extended  10\"x3  10'x 3                    L Biceps Stretch @ Wall  NV  30\" x3         " "           Corner Pec Stretch  NV  30\" x3                                                                                                                    Goal Oriented Functional Activity:                                                Modalities    MHP L Forearm/Wrist   Pre-Tx                       CP L Forearm/Wrist   Post- Tx             QR Code:    [facility] HEP:  Access Code: [email or web address]  URL: [email or web address]  Date: 08/13/2024  Prepared by: Kathy Rutledge    Exercises  - Standing Wrist Extension Stretch  - 2-3 x daily - 3 reps - 10 hold  - Standing Wrist Flexion Stretch  - 2-3 x daily - 3 reps - 10 hold    Patient Education  - Office Posture  - What Is De Quervain's Tenosynovitis?  - Ice  - Ice Massage       "

## 2024-08-19 ENCOUNTER — OFFICE VISIT (OUTPATIENT)
Dept: FAMILY MEDICINE CLINIC | Facility: CLINIC | Age: 49
End: 2024-08-19
Payer: COMMERCIAL

## 2024-08-19 ENCOUNTER — APPOINTMENT (OUTPATIENT)
Dept: RADIOLOGY | Facility: MEDICAL CENTER | Age: 49
End: 2024-08-19
Payer: COMMERCIAL

## 2024-08-19 VITALS
HEART RATE: 98 BPM | BODY MASS INDEX: 44.76 KG/M2 | TEMPERATURE: 97.6 F | HEIGHT: 59 IN | DIASTOLIC BLOOD PRESSURE: 80 MMHG | WEIGHT: 222 LBS | SYSTOLIC BLOOD PRESSURE: 128 MMHG | OXYGEN SATURATION: 100 %

## 2024-08-19 DIAGNOSIS — M70.61 GREATER TROCHANTERIC BURSITIS OF RIGHT HIP: ICD-10-CM

## 2024-08-19 DIAGNOSIS — M25.551 RIGHT HIP PAIN: Primary | ICD-10-CM

## 2024-08-19 DIAGNOSIS — M25.551 RIGHT HIP PAIN: ICD-10-CM

## 2024-08-19 PROCEDURE — 99214 OFFICE O/P EST MOD 30 MIN: CPT | Performed by: FAMILY MEDICINE

## 2024-08-19 PROCEDURE — 73502 X-RAY EXAM HIP UNI 2-3 VIEWS: CPT

## 2024-08-19 RX ORDER — PREDNISONE 20 MG/1
40 TABLET ORAL DAILY
Qty: 10 TABLET | Refills: 0 | Status: SHIPPED | OUTPATIENT
Start: 2024-08-19 | End: 2024-08-24

## 2024-08-19 NOTE — PROGRESS NOTES
Ambulatory Visit  Name: Jyoti Cedeno      : 1975      MRN: [id number]  Encounter Provider: Kodi Mcgowan MD  Encounter Date: 2024   Encounter department: [facility]    Assessment & Plan   1. Right hip pain  -     XR hip/pelv 2-3 vws right if performed; Future; Expected date: 2024  -     predniSONE 20 mg tablet; Take 2 tablets (40 mg total) by mouth daily for 5 days  2. Greater trochanteric bursitis of right hip  -     predniSONE 20 mg tablet; Take 2 tablets (40 mg total) by mouth daily for 5 days    Right hip pain lasting about 1 week, may be multifactorial including mild arthritis, greater trochanteric bursitis as well as some musculoskeletal strain  As patient did have positive logroll test will obtain x-ray of bilateral hip for evaluation of osteoarthritis  Will start patient on prednisone 40 mg for total 5 days to decrease inflammation  Will provide instruction with exercises to help with hip bursitis  If there is no adequate improvement in symptoms we may consider steroid injection into the right greater trochanter       History of Present Illness     Ankle Swelling  Associated symptoms include arthralgias. Pertinent negatives include no abdominal pain, chest pain, chills, congestion, fever, headaches or sore throat. The symptoms are aggravated by movement.       Jyoti is a 49-year-old female patient presents for evaluation for right-sided leg pain.  Patient reports she has been experiencing worsening pain with flexion of her right hip since about a week ago.  Patient reports she did have a episode where Scharle foot got stuck on the outside of bed and when she reports she noticed the pain.  Pain is in the inner aspect of the right hip.  She does not have any difficulty with abduction, extension or adduction of the right hip however taking forward with the right leg is difficult.  Patient seems to have difficulty with right hip flexion.  Reports about 6-1/2 out  of 10 pain at a neutral sitting position pain seemingly worsens, 10 out of 10 with hip flexion.    Patient is using gabapentin and ibuprofen without improvement.     Review of Systems   Constitutional:  Negative for chills and fever.   HENT:  Negative for congestion, rhinorrhea and sore throat.    Respiratory:  Negative for shortness of breath.    Cardiovascular:  Negative for chest pain.   Gastrointestinal:  Negative for abdominal pain.   Musculoskeletal:  Positive for arthralgias and gait problem.   Neurological:  Negative for dizziness and headaches.         Past Medical History:   Diagnosis Date    Asthma 01/1993    Complex partial epilepsy (HCC)     Seisure free since age 18     Diverticulitis of colon 11/92    Epilepsy (HCC)     Factor 5 Leiden mutation, heterozygous (HCC)     Fracture, patella     Lupus (HCC) 3/29/2002    Pregnancy     Resulted in 1 Living child     Seizures (HCC) 11/92    TIA (transient ischemic attack) 1/1/93     Past Surgical History:   Procedure Laterality Date    ABDOMINAL ADHESION SURGERY N/A 09/29/2023    Procedure: LYSIS ADHESIONS;  Surgeon: Maite Maldonado MD;  Location: [facility];  Service: Gynecology    BACK SURGERY      BREAST EXCISIONAL BIOPSY Left 02/14/2004    benign    HYSTERECTOMY  9/29/23    MAMMO STEREOTACTIC BREAST BIOPSY LEFT (ALL INC) Left 03/08/2023    NASAL SEPTUM SURGERY      Deviation Repair     WV CYSTOURETHROSCOPY N/A 09/29/2023    Procedure: CYSTOSCOPY;  Surgeon: Maite Maldonado MD;  Location: [facility];  Service: Gynecology    WV LAPS TOTAL HYSTERECT 250 GM/< W/RMVL TUBE/OVARY Bilateral 09/29/2023    Procedure: HYSTERECTOMY LAPAROSCOPIC TOTAL  WITH BILATERAL SALPINGO-OOPHERECTOMY;  Surgeon: Maite Maldonado MD;  Location: [facility];  Service: Gynecology    RHINOPLASTY       Family History   Problem Relation Age of Onset    Liver cancer Mother         age dx unknown    Colon cancer Mother 74    Cancer Mother         Liver and lung cancer    Heart attack  Father     Hypertension Father     Heart disease Father     No Known Problems Maternal Grandmother     No Known Problems Maternal Grandfather     Breast cancer Paternal Grandmother         age dx unknown    Colon cancer Paternal Grandmother         age dx unknown    Stomach cancer Paternal Grandfather         age dx unknown    Multiple myeloma Brother     No Known Problems Brother     No Known Problems Brother     Breast cancer Maternal Aunt         age dx unknown    No Known Problems Maternal Aunt     No Known Problems Maternal Aunt     No Known Problems Paternal Aunt     No Known Problems Son     Ovarian cancer Neg Hx     Uterine cancer Neg Hx     Cervical cancer Neg Hx      Social History     Tobacco Use    Smoking status: Never    Smokeless tobacco: Never   Vaping Use    Vaping status: Never Used   Substance and Sexual Activity    Alcohol use: Yes     Alcohol/week: 7.0 standard drinks of alcohol     Types: 5 Glasses of wine, 2 Shots of liquor per week     Comment: glass wine/day    Drug use: Never    Sexual activity: Yes     Partners: Male     Birth control/protection: Female Sterilization     Comment:      Current Outpatient Medications on File Prior to Visit   Medication Sig    albuterol (ProAir HFA) 90 mcg/act inhaler Inhale 2 puffs every 4 (four) hours as needed for wheezing or shortness of breath    calcium carbonate (OS-MELISSA) 1250 (500 Ca) MG tablet Take 1 tablet (1,250 mg total) by mouth daily    cyclobenzaprine (FLEXERIL) 5 mg tablet Take 1-2 tablets (5-10 mg total) by mouth 3 (three) times a day as needed for muscle spasms    ergocalciferol (VITAMIN D2) 50,000 units TAKE 1 CAPSULE BY MOUTH ONE TIME PER WEEK    gabapentin (NEURONTIN) 100 mg capsule Take 1 capsule (100 mg total) by mouth daily at bedtime    psyllium (METAMUCIL) 0.52 g capsule Take 0.52 g by mouth in the morning.    Red Yeast Rice 600 MG TABS Take by mouth    sertraline (ZOLOFT) 50 mg tablet Take 1 tablet (50 mg total) by mouth daily  "   Spacer/Aero Chamber Mouthpiece (SPACER DEVICE) for metered dose inhaler For use with metered dose inhaler. [facility]    ondansetron (ZOFRAN) 4 mg tablet Take 1 tablet (4 mg total) by mouth every 8 (eight) hours as needed for nausea or vomiting for up to 12 doses (Patient not taking: Reported on 8/19/2024)    sucralfate (CARAFATE) 1 g tablet Take 1 tablet (1 g total) by mouth 4 (four) times a day as needed (Abdominal pain) for up to 5 doses (Patient not taking: Reported on 8/3/2024)     Allergies   Allergen Reactions    Carbamazepine Hives    Cephalexin Hives    Flurbiprofen Fatigue    Phenytoin Hives     Immunization History   Administered Date(s) Administered    COVID-19 PFIZER VACCINE 0.3 ML IM 04/25/2021, 05/16/2021, 12/26/2021    COVID-19 Pfizer mRNA vacc PF joey-sucrose 12 yr and older (Comirnaty) 11/19/2023    Hep B, Adolescent or Pediatric 09/15/2014, 10/15/2014, 04/18/2015    Hep B, adult 09/15/2014    INFLUENZA 09/26/2014, 09/19/2015, 09/24/2015, 11/16/2016, 10/12/2018, 10/10/2020, 12/02/2021, 11/19/2023    Influenza Injectable, MDCK, Preservative Free, Quadrivalent, 0.5 mL 11/19/2023    Influenza Quadrivalent Preservative Free 3 years and older IM 11/16/2016    Influenza, seasonal, injectable 09/28/2013, 09/19/2015, 09/24/2015    Influenza, seasonal, injectable, preservative free 09/26/2014    Tuberculin Skin Test-PPD Intradermal 01/27/2017     Objective     /80   Pulse 98   Temp 97.6 °F (36.4 °C) (Temporal)   Ht 4' 11\" (1.499 m)   Wt 101 kg (222 lb)   LMP 08/21/2023 (Exact Date) Comment: denies preg  SpO2 100%   Breastfeeding No   BMI 44.84 kg/m²     Physical Exam  Vitals reviewed.   Constitutional:       General: She is not in acute distress.     Appearance: Normal appearance. She is obese. She is not ill-appearing, toxic-appearing or diaphoretic.   Cardiovascular:      Rate and Rhythm: Normal rate.      Pulses: Normal pulses.   Pulmonary:      Effort: Pulmonary effort is " "normal.   Abdominal:      General: Abdomen is flat.   Musculoskeletal:         General: Tenderness present. No swelling.      Comments: Patient has limited flexion of the right hip, at the sitting position she has difficulty he elevating her knee to her abdomen  Knee flexion extension is intact on the right side  Positive logroll test  Patient does have some pain with leg raises at a lying position  Sitting with tenderness over the right greater trochanter  At standing position patient has pain with kicking her right leg forward   Skin:     General: Skin is warm and dry.      Capillary Refill: Capillary refill takes less than 2 seconds.      Coloration: Skin is not jaundiced.   Neurological:      General: No focal deficit present.      Mental Status: She is alert.   Psychiatric:         Mood and Affect: Mood normal.         Kodi Mcgowan M.D.  Family Medicine    Please excuse any \"sound-alike\" errors that may have ocurred during the process of dictation. Parts of this note have been dictated and there may be errors present in the transcription process. Thank you.    Answers submitted by the patient for this visit:  Lower Extremity Injury Questionnaire (Submitted on 8/14/2024)  Chief Complaint: Lower extremity pain  Incident occurred: 3 to 5 days ago  Incident location: at home  Injury mechanism: a twisting injury  Pain location: right thigh  Pain quality: aching, burning  Pain - numeric: 8/10  Pain course: fluctuating  tingling: No  inability to bear weight: No  loss of motion: Yes  loss of sensation: No  muscle weakness: Yes  Foreign body present: no foreign bodies    "

## 2024-08-21 ENCOUNTER — OFFICE VISIT (OUTPATIENT)
Dept: PHYSICAL THERAPY | Facility: REHABILITATION | Age: 49
End: 2024-08-21
Payer: COMMERCIAL

## 2024-08-21 DIAGNOSIS — S69.92XA INJURY OF LEFT WRIST, INITIAL ENCOUNTER: Primary | ICD-10-CM

## 2024-08-21 PROCEDURE — 97110 THERAPEUTIC EXERCISES: CPT

## 2024-08-21 PROCEDURE — 97140 MANUAL THERAPY 1/> REGIONS: CPT

## 2024-08-21 NOTE — PROGRESS NOTES
"Daily Note     Today's date: 2024  Patient name: Jyoti Cedeno  : 1975  MRN: [id number]  Referring provider: Ivanna Velez CR*  Dx:   Encounter Diagnosis     ICD-10-CM    1. Injury of left wrist, initial encounter  S69.92XA                      Subjective: Pt reports that her thumb is a little sore, but her wrist it feeling a little better. She has been wearing her brace since she got it on Monday.    Objective: See treatment diary below      Assessment: Pt with good tolerance to treatment. Favorable response to manual techniques performed with improved soft tissue mobility along CET and wrist extensors afterward. Pt demonstrates good understanding of exercises and carryover with performance, denies adverse response post treatment. Pt would benefit from continued skilled PT to improve current deficits and maximize function.    Plan: Continue per plan of care.      Precautions:   Patient Active Problem List   Diagnosis    Asthma    Classic migraine with aura    Dyslipidemia    Factor V Leiden mutation (HCC)    Hyperglycemia    Morbid obesity (HCC)    Vitamin D deficiency    Family history of cancer    S/P total hysterectomy and BSO (bilateral salpingo-oophorectomy)    Monoallelic mutation of MITF gene           Daily Treatment Diary      Assessment                   Eval/Chirs SZYMANSKI                     FOTO  39/61       **         **   Manuals    STM L Wrist Extensors/CET  NV    SE                  Graston L Wrist Extensors  NV  SE           L Wrist Flexor/Extensor Stretching  NV  SE          Prescribed POC    Pt Education  MD                      HEP Issued/Updated  MD CARTAGENA Wrist Flexors Stretch with Elbow Extended  10\"x3  10\" x3    15\"x4                  L Wrist Extensors Stretch with Elbow Extended  10\"x3  10'x 3  15\"x4                  L Biceps Stretch @ Wall  NV  30\" x3  30\"x3                  Corner Pec Stretch  NV  30\" x3  30\"x3                    "                                                                                               Goal Oriented Functional Activity:                                                Modalities    MHP L Forearm/Wrist   Pre-Tx      5'                 CP L Forearm/Wrist   Post- Tx   declined          QR Code:    [facility] HEP:  Access Code: [email or web address]  URL: [email or web address]  Date: 08/13/2024  Prepared by: Kathy Rutledge    Exercises  - Standing Wrist Extension Stretch  - 2-3 x daily - 3 reps - 10 hold  - Standing Wrist Flexion Stretch  - 2-3 x daily - 3 reps - 10 hold    Patient Education  - Office Posture  - What Is De Quervain's Tenosynovitis?  - Ice  - Ice Massage

## 2024-08-27 ENCOUNTER — OFFICE VISIT (OUTPATIENT)
Dept: PHYSICAL THERAPY | Facility: REHABILITATION | Age: 49
End: 2024-08-27
Payer: COMMERCIAL

## 2024-08-27 DIAGNOSIS — S69.92XA INJURY OF LEFT WRIST, INITIAL ENCOUNTER: Primary | ICD-10-CM

## 2024-08-27 PROCEDURE — 97110 THERAPEUTIC EXERCISES: CPT

## 2024-08-27 PROCEDURE — 97140 MANUAL THERAPY 1/> REGIONS: CPT

## 2024-08-27 NOTE — PROGRESS NOTES
"Daily Note     Today's date: 2024  Patient name: Jyoti Cedeno  : 1975  MRN: [id number]  Referring provider: Ivanna Velez CR*  Dx:   Encounter Diagnosis     ICD-10-CM    1. Injury of left wrist, initial encounter  S69.92XA                      Subjective: Pt reports that her thumb is a little sore, but her wrist it feeling a little better. She has been wearing her brace since she got it on Monday.    Objective: See treatment diary below      Assessment: Pt with good tolerance to manual techniques with improved soft tissue mobility along wrist extensors and CET. Good recall of exercises and performs using good technique with min cues required. Pt would benefit from continued skilled PT to improve current deficits and maximize function.    Plan: Continue per plan of care.      Precautions:   Patient Active Problem List   Diagnosis    Asthma    Classic migraine with aura    Dyslipidemia    Factor V Leiden mutation (HCC)    Hyperglycemia    Morbid obesity (HCC)    Vitamin D deficiency    Family history of cancer    S/P total hysterectomy and BSO (bilateral salpingo-oophorectomy)    Monoallelic mutation of MITF gene           Daily Treatment Diary      Assessment                 Eval/Chris SZYMANSKI                     FOTO  39/61       **         **   Manuals    STM L Wrist Extensors/CET  NV  JM  SE  SE                Graston L Wrist Extensors  NV JM SE SE          L Wrist Flexor/Extensor Stretching  NV  SE SE         Prescribed POC    Pt Education  MD                      HEP Issued/Updated  MD                      L Wrist Flexors Stretch with Elbow Extended  10\"x3  10\" x3    15\"x4  15\"x4                L Wrist Extensors Stretch with Elbow Extended  10\"x3  10'x 3  15\"x4  15\"x4                L Biceps Stretch @ Wall  NV  30\" x3  30\"x3  30\"x3                Corner Pec Stretch  NV  30\" x3  30\"x3  30\"x3                                                                              "                                   Goal Oriented Functional Activity:                                                Modalities    MHP L Forearm/Wrist   Pre-Tx      5'  5'               CP L Forearm/Wrist   Post- Tx   declined Def to home         QR Code:    [facility] HEP:  Access Code: [email or web address]  URL: [email or web address]  Date: 08/13/2024  Prepared by: Kathy Rutledge    Exercises  - Standing Wrist Extension Stretch  - 2-3 x daily - 3 reps - 10 hold  - Standing Wrist Flexion Stretch  - 2-3 x daily - 3 reps - 10 hold    Patient Education  - Office Posture  - What Is De Quervain's Tenosynovitis?  - Ice  - Ice Massage

## 2024-08-30 ENCOUNTER — OFFICE VISIT (OUTPATIENT)
Dept: PHYSICAL THERAPY | Facility: REHABILITATION | Age: 49
End: 2024-08-30
Payer: COMMERCIAL

## 2024-08-30 DIAGNOSIS — S69.92XA INJURY OF LEFT WRIST, INITIAL ENCOUNTER: Primary | ICD-10-CM

## 2024-08-30 PROCEDURE — 97140 MANUAL THERAPY 1/> REGIONS: CPT

## 2024-08-30 PROCEDURE — 97110 THERAPEUTIC EXERCISES: CPT

## 2024-08-30 NOTE — PROGRESS NOTES
"Daily Note     Today's date: 2024  Patient name: Jyoti eCdeno  : 1975  MRN: [id number]  Referring provider: Ivanna Velez CR*  Dx:   Encounter Diagnosis     ICD-10-CM    1. Injury of left wrist, initial encounter  S69.92XA                      Subjective: pt reports with c/o soreness in lateral elbow through forearm. She noted increased soreness following last visit, but is gradually getting better. She noted using ice before bed last night which helps.      Objective: See treatment diary below      Assessment: Pt tolerated treatment well. Good response with manuals and exercises. Decreased soreness reported post treatment. Patient demonstrated fatigue post treatment, exhibited good technique with therapeutic exercises, and would benefit from continued PT.     Pt will be away for the next 2 weeks on vacation, therefore, instructed to heat elbow for 5-10 minutes and continue with current HEP; pt agreed with plan.      Plan: Continue per plan of care.  Progress treatment as tolerated.       Precautions:   Patient Active Problem List   Diagnosis    Asthma    Classic migraine with aura    Dyslipidemia    Factor V Leiden mutation (HCC)    Hyperglycemia    Morbid obesity (HCC)    Vitamin D deficiency    Family history of cancer    S/P total hysterectomy and BSO (bilateral salpingo-oophorectomy)    Monoallelic mutation of MITF gene           Daily Treatment Diary    /  Assessment               Jn/Reval  MD                     FOTO  39/61       **         **   Manuals    STM L Wrist Extensors/CET  NV  JM  SE  SE  TE              Graston L Wrist Extensors  NV JM SE SE TE         L Wrist Flexor/Extensor Stretching  NV  SE SE TE        Prescribed POC    Pt Education  MD                      HEP Issued/Updated  MD CARTAGENA Wrist Flexors Stretch with Elbow Extended  10\"x3  10\" x3    15\"x4  15\"x4  15\"x4              L Wrist Extensors Stretch with Elbow " "Extended  10\"x3  10'x 3  15\"x4  15\"x4  15\"x4              L Biceps Stretch @ Wall  NV  30\" x3  30\"x3  30\"x3  30\"x3              Corner Pec Stretch  NV  30\" x3  30\"x3  30\"x3  30\"x3                                                                                                              Goal Oriented Functional Activity:                                                Modalities    MHP L Forearm/Wrist   Pre-Tx      5'  5'  5'             CP L Forearm/Wrist   Post- Tx   declined Def to home Def to home        QR Code:    [facility] HEP:  Access Code: [email or web address]  URL: [email or web address]  Date: 08/13/2024  Prepared by: Kathy Rutledge    Exercises  - Standing Wrist Extension Stretch  - 2-3 x daily - 3 reps - 10 hold  - Standing Wrist Flexion Stretch  - 2-3 x daily - 3 reps - 10 hold    Patient Education  - Office Posture  - What Is De Quervain's Tenosynovitis?  - Ice  - Ice Massage         "

## 2024-09-20 ENCOUNTER — APPOINTMENT (OUTPATIENT)
Dept: PHYSICAL THERAPY | Facility: REHABILITATION | Age: 49
End: 2024-09-20
Payer: COMMERCIAL

## 2024-09-25 ENCOUNTER — ANNUAL EXAM (OUTPATIENT)
Dept: OBGYN CLINIC | Facility: MEDICAL CENTER | Age: 49
End: 2024-09-25
Payer: COMMERCIAL

## 2024-09-25 ENCOUNTER — OFFICE VISIT (OUTPATIENT)
Dept: PHYSICAL THERAPY | Facility: REHABILITATION | Age: 49
End: 2024-09-25
Payer: COMMERCIAL

## 2024-09-25 VITALS
HEIGHT: 59 IN | BODY MASS INDEX: 44.35 KG/M2 | DIASTOLIC BLOOD PRESSURE: 70 MMHG | WEIGHT: 220 LBS | SYSTOLIC BLOOD PRESSURE: 114 MMHG

## 2024-09-25 DIAGNOSIS — Z12.39 ENCOUNTER FOR OTHER SCREENING FOR MALIGNANT NEOPLASM OF BREAST: ICD-10-CM

## 2024-09-25 DIAGNOSIS — S69.92XA INJURY OF LEFT WRIST, INITIAL ENCOUNTER: Primary | ICD-10-CM

## 2024-09-25 DIAGNOSIS — Z01.419 ENCOUNTER FOR GYNECOLOGICAL EXAMINATION (GENERAL) (ROUTINE) WITHOUT ABNORMAL FINDINGS: Primary | ICD-10-CM

## 2024-09-25 PROCEDURE — 97140 MANUAL THERAPY 1/> REGIONS: CPT

## 2024-09-25 PROCEDURE — 99396 PREV VISIT EST AGE 40-64: CPT | Performed by: CLINICAL NURSE SPECIALIST

## 2024-09-25 PROCEDURE — 97110 THERAPEUTIC EXERCISES: CPT

## 2024-09-25 NOTE — PROGRESS NOTES
"Daily Note     Today's date: 2024  Patient name: Jyoti Cedeno  : 1975  MRN: [id number]  Referring provider: Ivanna Velez CR*  Dx:   Encounter Diagnosis     ICD-10-CM    1. Injury of left wrist, initial encounter  S69.92XA                      Subjective: pt reports her L wrist has been getting better since being away. She reports her L thumb will \"popping out\" while not wearing her brace and she has to pop it back in.    Objective: See treatment diary below      Assessment: Pt with good tolerance to manual techniques. Moderate soft tissue restriction noted along wrist extensors which improved with completion of manuals. Pt with good recall of exercises in current program and performs using good technique with min cues required. Pt denies adverse response post treatment and would benefit from continued skilled PT to improve current deficits and maximize function.    Plan: Continue per plan of care.  Progress treatment as tolerated.       Precautions:   Patient Active Problem List   Diagnosis    Asthma    Classic migraine with aura    Dyslipidemia    Factor V Leiden mutation (HCC)    Hyperglycemia    Morbid obesity (HCC)    Vitamin D deficiency    Family history of cancer    S/P total hysterectomy and BSO (bilateral salpingo-oophorectomy)    Monoallelic mutation of MITF gene           Daily Treatment Diary    /  Assessment             Jn/Chris SZYMANSKI                     FOTO         **         **   Manuals    STM L Wrist Extensors/CET  NV  JM  SE  SE  TE  SE            Graston L Wrist Extensors  NV JM SE SE TE SE        L Wrist Flexor/Extensor Stretching  NV  SE SE TE SE       Prescribed POC    Pt Education  MD                      HEP Issued/Updated  MD                      L Wrist Flexors Stretch with Elbow Extended  10\"x3  10\" x3    15\"x4  15\"x4  15\"x4  15\"x4            L Wrist Extensors Stretch with Elbow Extended  10\"x3  10'x 3  15\"x4  15\"x4 " " 15\"x4  15\"x4            L Biceps Stretch @ Wall  NV  30\" x3  30\"x3  30\"x3  30\"x3  30\"x3            Corner Pec Stretch  NV  30\" x3  30\"x3  30\"x3  30\"x3  30\"x3                                                                                                            Goal Oriented Functional Activity:                                                Modalities    MHP L Forearm/Wrist   Pre-Tx      5'  5'  5'  5'           CP L Forearm/Wrist   Post- Tx   declined Def to home Def to home Def to home       QR Code:    [facility]:  Access Code: [email or web address]  URL: [email or web address]  Date: 08/13/2024  Prepared by: Kathy Rutledge    Exercises  - Standing Wrist Extension Stretch  - 2-3 x daily - 3 reps - 10 hold  - Standing Wrist Flexion Stretch  - 2-3 x daily - 3 reps - 10 hold    Patient Education  - Office Posture  - What Is De Quervain's Tenosynovitis?  - Ice  - Ice Massage         "

## 2024-09-25 NOTE — PROGRESS NOTES
Subjective:      Jyoti Cedeno is a 49 y.o. female. Here for Gynecologic Exam (Hysterectomy /Pap 1/8/19 -/-/Mammo 9/13/23 /Colonoscopy 7/14/21 recall 5 years /Mother colon cancer /Paternal grandmother breast/ colon cancer /Maternal aunt breast cancer )      GYN HPI  Menstrual cycle:  Surgical menopause - hyster and BSO last September due to AUB. Focal simple hyperplasia on pathology    Vaginal c/o: denies  Urinary c/o: denies  Breast complaints:denies   She does do self breast Exams    Sexually active: yes ,   No c/o r/t sexual activity  She reports she feels safe at home.     Dietary calcium/vit D  intake: adequate  Lifestyle: aqua aerobics twice weekly. Walks daily    HEALTH MAINTENANCE SCREENINGS:    Last Papanicolaou test:  01/08/2019   History of abnormal pap: No  Last mammogram:  09/13/2023  Last Colon Cancer Screening: colonoscopy: 07/24/2021 Cologuard:Not on file. Recall 5yrs    Hereditary Cancer Screening  Cancer-related family history includes Breast cancer in her maternal aunt and paternal grandmother; Cancer in her mother; Colon cancer in her paternal grandmother; Colon cancer (age of onset: 74) in her mother; Liver cancer in her mother; Stomach cancer in her paternal grandfather. There is no history of Ovarian cancer, Uterine cancer, or Cervical cancer.       Substance Abuse Screening Completed. See hx and flowsheet.    The following portions of the patient's history were reviewed and updated as appropriate: allergies, current medications, past family history, past medical history, past social history, past surgical history, and problem list.  Review of Systems   Constitutional:  Negative for appetite change, chills, fatigue, fever and unexpected weight change.   HENT: Negative.     Eyes: Negative.    Respiratory:  Negative for chest tightness and shortness of breath.    Cardiovascular:  Negative for chest pain and palpitations.   Gastrointestinal:  Negative for abdominal pain,  "constipation and vomiting.   Endocrine: Negative for cold intolerance and heat intolerance.   Genitourinary:         As per HPI   Musculoskeletal:  Negative for back pain, joint swelling and neck pain.   Skin:  Negative for color change and rash.   Neurological:  Negative for dizziness, weakness and numbness.   Hematological:  Does not bruise/bleed easily.   Psychiatric/Behavioral: Negative.               Objective:  /70 (BP Location: Left arm, Patient Position: Sitting, Cuff Size: Large)   Ht 4' 11\" (1.499 m)   Wt 99.8 kg (220 lb)   LMP 08/21/2023 (Exact Date) Comment: denies preg  BMI 44.43 kg/m²        Physical Exam  Constitutional:       General: She is not in acute distress.     Appearance: Normal appearance.   Genitourinary:      Vulva and rectum normal.      No lesions in the vagina.      Genitourinary Comments: Cervix/Uterus surgically absent      Right Labia: No rash or lesions.     Left Labia: No lesions or rash.     No vaginal discharge, erythema, tenderness or bleeding.        Right Adnexa: not tender and no mass present.     Left Adnexa: not tender and no mass present.     No cervical motion tenderness, discharge or friability.      Uterus is not enlarged or tender.      Uterus is absent.      No urethral prolapse present.      Pelvic exam was performed with patient in the lithotomy position.   Breasts:     Breasts are symmetrical.      Right: No inverted nipple, mass, nipple discharge, skin change or tenderness.      Left: No inverted nipple, mass, nipple discharge, skin change or tenderness.   HENT:      Head: Normocephalic and atraumatic.   Cardiovascular:      Rate and Rhythm: Normal rate.      Heart sounds: No murmur heard.  Pulmonary:      Effort: Pulmonary effort is normal.      Breath sounds: Normal breath sounds.   Abdominal:      General: There is no distension.      Palpations: Abdomen is soft.      Tenderness: There is no abdominal tenderness.   Musculoskeletal:         General: " Normal range of motion.      Cervical back: Normal range of motion.   Lymphadenopathy:      Cervical: No cervical adenopathy.   Neurological:      Mental Status: She is alert and oriented to person, place, and time.   Skin:     General: Skin is warm and dry.   Psychiatric:         Mood and Affect: Mood normal.         Behavior: Behavior normal.   Vitals reviewed.             Assessment/Plan:           ANNUAL GYN EXAM- Primary  Annual GYN examination completed today.   Health maintenance reviewed/updated as appropriate  Cervical cancer screen: Previous pap smears and ASCCP screening guidelines have been reviewed. Pap no longer indicated.  Breast Health: Encouraged regular self breast exams. Mammo ordered.  Colon cancer screening:  due 2025    Risk prevention and anticipatory guidance provided including:  Age related Calcium and vitamin D intake  Dietary and lifestyle recommendations based on her age and weight. body mass index is 44.43 kg/m²..    Tobacco and alcohol use, intervention ordered if applicable.   Condom use for prevention of STI's.  Contraception:  N/A- S/P Hysterectomy    Problem List Items Addressed This Visit    None  Visit Diagnoses       Encounter for gynecological examination (general) (routine) without abnormal findings    -  Primary    Encounter for other screening for malignant neoplasm of breast        Relevant Orders    Mammo screening bilateral w 3d and cad            Orders Placed This Encounter   Procedures    Mammo screening bilateral w 3d and cad

## 2024-09-27 ENCOUNTER — OFFICE VISIT (OUTPATIENT)
Dept: PHYSICAL THERAPY | Facility: REHABILITATION | Age: 49
End: 2024-09-27
Payer: COMMERCIAL

## 2024-09-27 DIAGNOSIS — S69.92XA INJURY OF LEFT WRIST, INITIAL ENCOUNTER: Primary | ICD-10-CM

## 2024-09-27 PROCEDURE — 97140 MANUAL THERAPY 1/> REGIONS: CPT | Performed by: PHYSICAL THERAPIST

## 2024-09-27 NOTE — PROGRESS NOTES
"Daily Note     Today's date: 2024  Patient name: Jyoti Cedeno  : 1975  MRN: [id number]  Referring provider: Ivanna Velez CR*  Dx:   Encounter Diagnosis     ICD-10-CM    1. Injury of left wrist, initial encounter  S69.92XA                      Subjective: Pt reports she is overall feeling better since starting PT. She just returned from vacation and will be going away again in 2 weeks.     Objective: See treatment diary below. Reviewed current HEP to which she demonstrated and verbalized understanding.    Assessment: Pt with good response to manual techniques with improved myofascial mobility with completion. Pt demonstrates good understanding and carryover with current program. Will continue to progress program as able. Pt will benefit from continued skilled PT intervention in order to address remaining limitations and to restore maximal function.     Plan: Continue per plan of care.  Progress treatment as tolerated.       Precautions:   Patient Active Problem List   Diagnosis    Asthma    Classic migraine with aura    Dyslipidemia    Factor V Leiden mutation (HCC)    Hyperglycemia    Morbid obesity (HCC)    Vitamin D deficiency    Family history of cancer    S/P total hysterectomy and BSO (bilateral salpingo-oophorectomy)    Monoallelic mutation of MITF gene           Daily Treatment Diary    /  Assessment           Jn/Reval  MD                     FOTO         **         **   Manuals    STM L Wrist Extensors/CET  NV  JM  SE  SE  TE  SE  MD Burris L Wrist Extensors  NV JM SE SE TE SE  MD       L Wrist Flexor/Extensor Stretching  NV  SE SE TE SE  MD      Prescribed POC    Pt Education  MD                      HEP Issued/Updated  MD CARTAGENA Wrist Flexors Stretch with Elbow Extended  10\"x3  10\" x3    15\"x4  15\"x4  15\"x4  15\"x4  HEP          L Wrist Extensors Stretch with Elbow Extended  10\"x3  10'x 3  15\"x4  " "15\"x4  15\"x4  15\"x4  HEP          L Biceps Stretch @ Wall  NV  30\" x3  30\"x3  30\"x3  30\"x3  30\"x3  HEP          Corner Pec Stretch  NV  30\" x3  30\"x3  30\"x3  30\"x3  30\"x3  HEP                                                                                                          Goal Oriented Functional Activity:                                                Modalities    MHP L Forearm/Wrist   Pre-Tx      5'  5'  5'  5'  5'         CP L Forearm/Wrist   Post- Tx   declined Def to home Def to home Def to home       QR Code:    [facility]P:  Access Code: [email or web address]  URL: [email or web address]  Date: 08/13/2024  Prepared by: Kathy Rutledge    Exercises  - Standing Wrist Extension Stretch  - 2-3 x daily - 3 reps - 10 hold  - Standing Wrist Flexion Stretch  - 2-3 x daily - 3 reps - 10 hold    Patient Education  - Office Posture  - What Is De Quervain's Tenosynovitis?  - Ice  - Ice Massage         "

## 2024-09-30 ENCOUNTER — OFFICE VISIT (OUTPATIENT)
Dept: PHYSICAL THERAPY | Facility: REHABILITATION | Age: 49
End: 2024-09-30
Payer: COMMERCIAL

## 2024-09-30 DIAGNOSIS — S69.92XA INJURY OF LEFT WRIST, INITIAL ENCOUNTER: Primary | ICD-10-CM

## 2024-09-30 PROCEDURE — 97140 MANUAL THERAPY 1/> REGIONS: CPT | Performed by: PHYSICAL THERAPIST

## 2024-09-30 NOTE — PROGRESS NOTES
"Daily Note     Today's date: 2024  Patient name: Jyoti Cedeno  : 1975  MRN: [id number]  Referring provider: Ivanna Velez CR*  Dx:   Encounter Diagnosis     ICD-10-CM    1. Injury of left wrist, initial encounter  S69.92XA                      Subjective: Pt reports she is overall feeling better since starting PT. She just returned from vacation and will be going away again in 2 weeks.     Objective: See treatment diary below. Reviewed current HEP to which she demonstrated and verbalized understanding.    Assessment: Pt with good response to manual techniques with improved myofascial mobility with completion. Pt demonstrates good understanding and carryover with current program. Will continue to progress program as able. Pt will benefit from continued skilled PT intervention in order to address remaining limitations and to restore maximal function.     Plan: Continue per plan of care.  Progress treatment as tolerated.       Precautions:   Patient Active Problem List   Diagnosis    Asthma    Classic migraine with aura    Dyslipidemia    Factor V Leiden mutation (HCC)    Hyperglycemia    Morbid obesity (HCC)    Vitamin D deficiency    Family history of cancer    S/P total hysterectomy and BSO (bilateral salpingo-oophorectomy)    Monoallelic mutation of MITF gene           Daily Treatment Diary    /  Assessment         Jn/Chris SZYMANSKI                     FOTO         **         **   Manuals    STM L Wrist Extensors/CET  NV  JM  SE  SE  TE  SE  MD MD Burris L Wrist Extensors  NV JM SE SE TE SE  MD SZYMANSKI      L Wrist Flexor/Extensor Stretching  NV  SE SE TE SE  MD SZYMANSKI     Prescribed POC    Pt Education  MD                      HEP Issued/Updated  MD                      L Wrist Flexors Stretch with Elbow Extended  10\"x3  10\" x3    15\"x4  15\"x4  15\"x4  15\"x4  HEP  HEP        L Wrist Extensors Stretch with Elbow Extended  10\"x3  10'x " "3  15\"x4  15\"x4  15\"x4  15\"x4  HEP  HEP        L Biceps Stretch @ Wall  NV  30\" x3  30\"x3  30\"x3  30\"x3  30\"x3  HEP  HEP        Corner Pec Stretch  NV  30\" x3  30\"x3  30\"x3  30\"x3  30\"x3  HEP  HEP                                                                                                        Goal Oriented Functional Activity:                                                Modalities    MHP L Forearm/Wrist   Pre-Tx      5'  5'  5'  5'  5'  5'       CP L Forearm/Wrist   Post- Tx   declined Def to home Def to home Def to home  5'     QR Code:    [facility] HEP:  Access Code: [email or web address]  URL: [email or web address]  Date: 08/13/2024  Prepared by: Kathy Rutledge    Exercises  - Standing Wrist Extension Stretch  - 2-3 x daily - 3 reps - 10 hold  - Standing Wrist Flexion Stretch  - 2-3 x daily - 3 reps - 10 hold    Patient Education  - Office Posture  - What Is De Quervain's Tenosynovitis?  - Ice  - Ice Massage         "

## 2024-10-02 ENCOUNTER — APPOINTMENT (OUTPATIENT)
Dept: PHYSICAL THERAPY | Facility: REHABILITATION | Age: 49
End: 2024-10-02
Payer: COMMERCIAL

## 2024-10-07 ENCOUNTER — OFFICE VISIT (OUTPATIENT)
Dept: PHYSICAL THERAPY | Facility: REHABILITATION | Age: 49
End: 2024-10-07
Payer: COMMERCIAL

## 2024-10-07 DIAGNOSIS — S69.92XA INJURY OF LEFT WRIST, INITIAL ENCOUNTER: Primary | ICD-10-CM

## 2024-10-07 PROCEDURE — 97140 MANUAL THERAPY 1/> REGIONS: CPT | Performed by: PHYSICAL THERAPIST

## 2024-10-07 NOTE — PROGRESS NOTES
"Daily Note     Today's date: 10/7/2024  Patient name: Jyoti Cedeno  : 1975  MRN: [id number]  Referring provider: Ivanna Velze CR*  Dx:   Encounter Diagnosis     ICD-10-CM    1. Injury of left wrist, initial encounter  S69.92XA                      Subjective: Pt reports she had soreness in her forearm following last session that lasted for several days. She will be leaving for vacation this week.      Objective: See treatment diary below. Reviewed current HEP to which she demonstrated and verbalized understanding.    Assessment: Pt with good response to manual techniques with improved myofascial mobility with completion without c/o pain or adverse response. Pt demonstrates good understanding and carryover with current program. Will continue to progress program as able. Pt will benefit from continued skilled PT intervention in order to address remaining limitations and to restore maximal function.     Plan: Continue per plan of care.  Progress treatment as tolerated.       Precautions:   Patient Active Problem List   Diagnosis    Asthma    Classic migraine with aura    Dyslipidemia    Factor V Leiden mutation (HCC)    Hyperglycemia    Morbid obesity (HCC)    Vitamin D deficiency    Family history of cancer    S/P total hysterectomy and BSO (bilateral salpingo-oophorectomy)    Monoallelic mutation of MITF gene           Daily Treatment Diary    /  Assessment  8/13  8/16  8/21  8/27  8/30  9/25  9/27  9/30  10/7     Eval/Chris SZYMANSKI                     FOTO         **         **   Manuals    STM L Wrist Extensors/CET  NV  JM  SE  SE  TE  SE  MD MD MD Burris L Wrist Extensors  NV JM SE SE TE SE  MD MD SZYMANSKI     L Wrist Flexor/Extensor Stretching  NV  SE SE TE SE  MD MD SZYMANSKI    Prescribed POC    Pt Education  MD                      HEP Issued/Updated  MD CARTAGENA Wrist Flexors Stretch with Elbow Extended  10\"x3  10\" x3    15\"x4  15\"x4  15\"x4  15\"x4  HEP  HEP        L " "Wrist Extensors Stretch with Elbow Extended  10\"x3  10'x 3  15\"x4  15\"x4  15\"x4  15\"x4  HEP  HEP        L Biceps Stretch @ Wall  NV  30\" x3  30\"x3  30\"x3  30\"x3  30\"x3  HEP  HEP  30\"x3      Corner Pec Stretch  NV  30\" x3  30\"x3  30\"x3  30\"x3  30\"x3  HEP  HEP  30\"x3                                                                                                      Goal Oriented Functional Activity:                                                Modalities    MHP L Forearm/Wrist   Pre-Tx      5'  5'  5'  5'  5'  5'  5'     CP L Forearm/Wrist   Post- Tx   declined Def to home Def to home Def to home  5'  5'    QR Code:    [facility] HEP:  Access Code: [email or web address]  URL: [email or web address]  Date: 08/13/2024  Prepared by: Kathy Rutledge    Exercises  - Standing Wrist Extension Stretch  - 2-3 x daily - 3 reps - 10 hold  - Standing Wrist Flexion Stretch  - 2-3 x daily - 3 reps - 10 hold    Patient Education  - Office Posture  - What Is De Quervain's Tenosynovitis?  - Ice  - Ice Massage         "

## 2024-10-09 ENCOUNTER — APPOINTMENT (OUTPATIENT)
Dept: PHYSICAL THERAPY | Facility: REHABILITATION | Age: 49
End: 2024-10-09
Payer: COMMERCIAL

## 2024-10-21 ENCOUNTER — OFFICE VISIT (OUTPATIENT)
Dept: PHYSICAL THERAPY | Facility: REHABILITATION | Age: 49
End: 2024-10-21
Payer: COMMERCIAL

## 2024-10-21 ENCOUNTER — APPOINTMENT (OUTPATIENT)
Dept: PHYSICAL THERAPY | Facility: REHABILITATION | Age: 49
End: 2024-10-21
Payer: COMMERCIAL

## 2024-10-21 DIAGNOSIS — S69.92XA INJURY OF LEFT WRIST, INITIAL ENCOUNTER: Primary | ICD-10-CM

## 2024-10-21 PROCEDURE — 97140 MANUAL THERAPY 1/> REGIONS: CPT | Performed by: PHYSICAL THERAPIST

## 2024-10-21 NOTE — PROGRESS NOTES
"Daily Note     Today's date: 10/21/2024  Patient name: Jyoti Cedeno  : 1975  MRN: [id number]  Referring provider: Ivanna Velez CR*  Dx:   Encounter Diagnosis     ICD-10-CM    1. Injury of left wrist, initial encounter  S69.92XA                      Subjective: Pt reports she just returned from vacation and that her forearm is feeling better with the rest and using water jets to massage her arm.     Objective: See treatment diary below. Reviewed current HEP to which she demonstrated and verbalized understanding.    Assessment: Pt with good response to manual techniques with improved myofascial mobility with completion without c/o pain or adverse response. Pt demonstrates good understanding and carryover with current program. Will continue to progress program as able. Pt will benefit from continued skilled PT intervention in order to address remaining limitations and to restore maximal function.     Plan: Continue per plan of care.  Progress treatment as tolerated.       Precautions:   Patient Active Problem List   Diagnosis    Asthma    Classic migraine with aura    Dyslipidemia    Factor V Leiden mutation (HCC)    Hyperglycemia    Morbid obesity (HCC)    Vitamin D deficiency    Family history of cancer    S/P total hysterectomy and BSO (bilateral salpingo-oophorectomy)    Monoallelic mutation of MITF gene           Daily Treatment Diary    /  Assessment  8/13  8/16  8/21  8/27  8/30  9/25  9/27  9/30  10/7  10/21   Jn/Chris SZYMANSKI                     FOTO         **         **   Manuals    STM L Wrist Extensors/CET  NV  JM  SE  SE  TE  SE  MD MD MD MD Burris L Wrist Extensors  NV JM SE SE TE SE  MD MD MD SZYMANSKI    L Wrist Flexor/Extensor Stretching  NV  SE SE TE SE  MD MD MD SZYMANSKI   Prescribed POC    Pt Education  MD                      HEP Issued/Updated  MD CARTAGENA Wrist Flexors Stretch with Elbow Extended  10\"x3  10\" x3    15\"x4  15\"x4  15\"x4  15\"x4  HEP  " "HEP        L Wrist Extensors Stretch with Elbow Extended  10\"x3  10'x 3  15\"x4  15\"x4  15\"x4  15\"x4  HEP  HEP        L Biceps Stretch @ Wall  NV  30\" x3  30\"x3  30\"x3  30\"x3  30\"x3  HEP  HEP  30\"x3  30\"x3    Corner Pec Stretch  NV  30\" x3  30\"x3  30\"x3  30\"x3  30\"x3  HEP  HEP  30\"x3  30\"x3                                                                                                    Goal Oriented Functional Activity:                                                Modalities    MHP L Forearm/Wrist   Pre-Tx      5'  5'  5'  5'  5'  5'  5'  5'   CP L Forearm/Wrist   Post- Tx   declined Def to home Def to home Def to home  5'  5'  5'   QR Code:    [facility] HEP:  Access Code: [email or web address]  URL: [email or web address]  Date: 08/13/2024  Prepared by: Kathy Rutledge    Exercises  - Standing Wrist Extension Stretch  - 2-3 x daily - 3 reps - 10 hold  - Standing Wrist Flexion Stretch  - 2-3 x daily - 3 reps - 10 hold    Patient Education  - Office Posture  - What Is De Quervain's Tenosynovitis?  - Ice  - Ice Massage         "

## 2024-10-23 ENCOUNTER — APPOINTMENT (OUTPATIENT)
Dept: PHYSICAL THERAPY | Facility: REHABILITATION | Age: 49
End: 2024-10-23
Payer: COMMERCIAL

## 2024-10-23 ENCOUNTER — OFFICE VISIT (OUTPATIENT)
Age: 49
End: 2024-10-23
Payer: COMMERCIAL

## 2024-10-23 VITALS — WEIGHT: 222.4 LBS | BODY MASS INDEX: 44.84 KG/M2 | HEIGHT: 59 IN

## 2024-10-23 DIAGNOSIS — M65.4 DE QUERVAIN'S TENOSYNOVITIS, LEFT: Primary | ICD-10-CM

## 2024-10-23 PROCEDURE — 20550 NJX 1 TENDON SHEATH/LIGAMENT: CPT | Performed by: PHYSICIAN ASSISTANT

## 2024-10-23 PROCEDURE — 99203 OFFICE O/P NEW LOW 30 MIN: CPT | Performed by: PHYSICIAN ASSISTANT

## 2024-10-23 RX ORDER — ROPIVACAINE HYDROCHLORIDE 5 MG/ML
1 INJECTION, SOLUTION EPIDURAL; INFILTRATION; PERINEURAL
Status: COMPLETED | OUTPATIENT
Start: 2024-10-23 | End: 2024-10-23

## 2024-10-23 RX ORDER — BETAMETHASONE SODIUM PHOSPHATE AND BETAMETHASONE ACETATE 3; 3 MG/ML; MG/ML
6 INJECTION, SUSPENSION INTRA-ARTICULAR; INTRALESIONAL; INTRAMUSCULAR; SOFT TISSUE
Status: COMPLETED | OUTPATIENT
Start: 2024-10-23 | End: 2024-10-23

## 2024-10-23 RX ADMIN — BETAMETHASONE SODIUM PHOSPHATE AND BETAMETHASONE ACETATE 6 MG: 3; 3 INJECTION, SUSPENSION INTRA-ARTICULAR; INTRALESIONAL; INTRAMUSCULAR; SOFT TISSUE at 15:00

## 2024-10-23 RX ADMIN — ROPIVACAINE HYDROCHLORIDE 1 ML: 5 INJECTION, SOLUTION EPIDURAL; INFILTRATION; PERINEURAL at 15:00

## 2024-10-23 NOTE — PROGRESS NOTES
"Patient Name:  Jyoti Cedeno  MRN:  [id number]    Assessment & Plan     Left wrist de Quervain's tenosynovitis  Patient was offered and accepted a left wrist first dorsal compartment corticosteroid injection today.  Continue thumb spica brace for comfort, wean as tolerated as pain improves.  Continue physical therapy, transition to home exercise program as appropriate.  Anti-inflammatories as needed.  Gradually return to new activities as tolerated.  Follow-up in 8 weeks with one of our hand specialists in the [place] location.    Chief Complaint     Left wrist pain    History of the Present Illness     Jyoti Cedeno is a 49 y.o. right-hand-dominant female who reports to the office today for evaluation of her left wrist.  She notes an onset of pain approximately 5 months ago.  She states the lid of her chest freezer closed on her hand and wrist which did cause progressively worsening pain.  She notes pain localized primarily to the radial aspect of the wrist with associated swelling and clicking and popping.  Pain can reach 6 out of 10 in intensity.  She has been utilizing a thumb spica brace and performing physical therapy with some overall improvement.  She takes over-the-counter anti-inflammatories intermittently as well.  Pain is worse with repetitive use and lifting.  She notes weakness due to the pain.  No instability.  No numbness or tingling.  No fevers or chills.    Physical Exam     Ht 4' 11\" (1.499 m)   Wt 101 kg (222 lb 6.4 oz)   LMP 08/21/2023 (Exact Date) Comment: denies preg  BMI 44.92 kg/m²     Left wrist: No gross deformity.  Skin intact without erythema ecchymosis or swelling.  There is tenderness over the first dorsal compartment.  No tenderness thumb CMC joint.  No tenderness distal radius and distal ulna.  No tenderness about the carpus and digits.  Full wrist flexion and extension without pain.  Full composite fist formation without pain.  Negative CMC grind test.  Positive " Finkelstein test.  Thumb range of motion is intact and full with clicking and popping noted in the first dorsal compartment with discomfort appreciated as well.  Sensation intact median ulnar and radial nerves.  2+ radial pulse.    Eyes: Anicteric sclerae.  ENT: Trachea midline.  Lungs: Normal respiratory effort.  CV: Capillary refill is less than 2 seconds.  Skin: Intact without erythema.  Lymph: No palpable lymphadenopathy.  Neuro: Sensation is grossly intact to light touch.  Psych: Mood and affect are appropriate.    Data Review     I have personally reviewed pertinent films in PACS, and my interpretation follows:    X-rays left wrist 8/3/2024: No acute osseous abnormality.  No fracture or dislocation.  No significant degenerative changes.    Past Medical History:   Diagnosis Date    Asthma 01/1993    Complex partial epilepsy (HCC)     Seisure free since age 18     Diverticulitis of colon 11/92    Epilepsy (HCC)     Factor 5 Leiden mutation, heterozygous (HCC)     Fracture, patella     Lupus 3/29/2002    Pregnancy     Resulted in 1 Living child     Seizures ([facility]) 11/92    TIA (transient ischemic attack) 1/1/93       Past Surgical History:   Procedure Laterality Date    ABDOMINAL ADHESION SURGERY N/A 09/29/2023    Procedure: LYSIS ADHESIONS;  Surgeon: Maite Maldonado MD;  Location: [facility];  Service: Gynecology    BACK SURGERY      BREAST EXCISIONAL BIOPSY Left 02/14/2004    benign    HYSTERECTOMY  9/29/23    MAMMO STEREOTACTIC BREAST BIOPSY LEFT (ALL INC) Left 03/08/2023    NASAL SEPTUM SURGERY      Deviation Repair     GA CYSTOURETHROSCOPY N/A 09/29/2023    Procedure: CYSTOSCOPY;  Surgeon: Maite Maldonado MD;  Location: [facility];  Service: Gynecology    GA LAPS TOTAL HYSTERECT 250 GM/< W/RMVL TUBE/OVARY Bilateral 09/29/2023    Procedure: HYSTERECTOMY LAPAROSCOPIC TOTAL  WITH BILATERAL SALPINGO-OOPHERECTOMY;  Surgeon: Maite Maldonado MD;  Location: [facility];  Service: Gynecology    RHINOPLASTY          Allergies   Allergen Reactions    Carbamazepine Hives    Cephalexin Hives    Flurbiprofen Fatigue    Phenytoin Hives       Current Outpatient Medications on File Prior to Visit   Medication Sig Dispense Refill    albuterol (ProAir HFA) 90 mcg/act inhaler Inhale 2 puffs every 4 (four) hours as needed for wheezing or shortness of breath 18 g 3    calcium carbonate (OS-MELISSA) 1250 (500 Ca) MG tablet Take 1 tablet (1,250 mg total) by mouth daily 150 tablet 3    cyclobenzaprine (FLEXERIL) 5 mg tablet Take 1-2 tablets (5-10 mg total) by mouth 3 (three) times a day as needed for muscle spasms 30 tablet 0    ergocalciferol (VITAMIN D2) 50,000 units TAKE 1 CAPSULE BY MOUTH ONE TIME PER WEEK 12 capsule 0    gabapentin (NEURONTIN) 100 mg capsule Take 1 capsule (100 mg total) by mouth daily at bedtime 90 capsule 1    ondansetron (ZOFRAN) 4 mg tablet Take 1 tablet (4 mg total) by mouth every 8 (eight) hours as needed for nausea or vomiting for up to 12 doses 12 tablet 0    psyllium (METAMUCIL) 0.52 g capsule Take 0.52 g by mouth in the morning.      Red Yeast Rice 600 MG TABS Take by mouth      sertraline (ZOLOFT) 50 mg tablet Take 1 tablet (50 mg total) by mouth daily 90 tablet 1    Spacer/Aero Chamber Mouthpiece (SPACER DEVICE) for metered dose inhaler For use with metered dose inhaler. Optichamber Mary VHC 1 Device 0    sucralfate (CARAFATE) 1 g tablet Take 1 tablet (1 g total) by mouth 4 (four) times a day as needed (Abdominal pain) for up to 5 doses (Patient not taking: Reported on 8/3/2024) 5 tablet 0     No current facility-administered medications on file prior to visit.       Social History     Tobacco Use    Smoking status: Never    Smokeless tobacco: Never   Vaping Use    Vaping status: Never Used   Substance Use Topics    Alcohol use: Yes     Alcohol/week: 7.0 standard drinks of alcohol     Types: 5 Glasses of wine, 2 Shots of liquor per week     Comment: 1-2 glasses of wine/ day    Drug use: Never        Family History   Problem Relation Age of Onset    Liver cancer Mother         age dx unknown    Colon cancer Mother 74    Cancer Mother         Liver and lung cancer    Heart attack Father     Hypertension Father     Heart disease Father     No Known Problems Maternal Grandmother     No Known Problems Maternal Grandfather     Breast cancer Paternal Grandmother         age dx unknown    Colon cancer Paternal Grandmother         age dx unknown    Stomach cancer Paternal Grandfather         age dx unknown    Multiple myeloma Brother     No Known Problems Brother     No Known Problems Brother     Breast cancer Maternal Aunt         age dx unknown    No Known Problems Maternal Aunt     No Known Problems Maternal Aunt     No Known Problems Paternal Aunt     No Known Problems Son     Ovarian cancer Neg Hx     Uterine cancer Neg Hx     Cervical cancer Neg Hx        Review of Systems     As stated in the HPI. All other systems reviewed and are negative.      Hand/upper extremity injection: L extensor compartment 1  Procedure Details  Condition:de Quervain's tenosynovitis Site: L extensor compartment 1   Needle size: 25 G  Approach: dorsal  Medications administered: 6 mg betamethasone acetate-betamethasone sodium phosphate 6 (3-3) mg/mL; 1 mL ropivacaine 0.5 %  Patient tolerance: patient tolerated the procedure well with no immediate complications  Dressing:  Sterile dressing applied

## 2024-10-28 ENCOUNTER — OFFICE VISIT (OUTPATIENT)
Dept: PHYSICAL THERAPY | Facility: REHABILITATION | Age: 49
End: 2024-10-28
Payer: COMMERCIAL

## 2024-10-28 ENCOUNTER — APPOINTMENT (OUTPATIENT)
Dept: PHYSICAL THERAPY | Facility: REHABILITATION | Age: 49
End: 2024-10-28
Payer: COMMERCIAL

## 2024-10-28 DIAGNOSIS — S69.92XA INJURY OF LEFT WRIST, INITIAL ENCOUNTER: Primary | ICD-10-CM

## 2024-10-28 PROCEDURE — 97140 MANUAL THERAPY 1/> REGIONS: CPT

## 2024-10-28 NOTE — PROGRESS NOTES
"Daily Note     Today's date: 10/28/2024  Patient name: Jyoti Cedeno  : 1975  MRN: [id number]  Referring provider: Ivanna Velez CR*  Dx:   Encounter Diagnosis     ICD-10-CM    1. Injury of left wrist, initial encounter  S69.92XA                      Subjective: pt reports returning to ortho MD in which he was pleased with her progress. She noted her wrist has been feeling pretty good.      Objective: See treatment diary below      Assessment: Pt tolerated treatment well and without complaints. Decreased soft tissue restrictions in wrist extensors. Good recall with current exercises and great carry over as well.  Patient demonstrated fatigue post treatment, exhibited good technique with therapeutic exercises, and would benefit from continued PT.      Plan: Continue per plan of care.  Progress treatment as tolerated.       Precautions:   Patient Active Problem List   Diagnosis    Asthma    Classic migraine with aura    Dyslipidemia    Factor V Leiden mutation (HCC)    Hyperglycemia    Morbid obesity (HCC)    Vitamin D deficiency    Family history of cancer    S/P total hysterectomy and BSO (bilateral salpingo-oophorectomy)    Monoallelic mutation of MITF gene           Daily Treatment Diary    /  Assessment  10/23  8/16  8/21  8/27  8/30  9/25  9/27  9/30  10/7  10/21   Eval/Reval                      FOTO        **  52/61       **   Manuals    STM L Wrist Extensors/CET TE  JM  SE  SE  TE  SE  MD MD MD MD Burris L Wrist Extensors TE JM SE SE TE SE  MD MD MD SZYMANSKI    L Wrist Flexor/Extensor Stretching TE  SE SE TE SE  MD MD MD SZYMANSKI   Prescribed POC    Pt Education                       HEP Issued/Updated                       L Wrist Flexors Stretch with Elbow Extended   10\" x3    15\"x4  15\"x4  15\"x4  15\"x4  HEP  HEP        L Wrist Extensors Stretch with Elbow Extended   10'x 3  15\"x4  15\"x4  15\"x4  15\"x4  HEP  HEP        L Biceps Stretch @ Wall   30\" x3  30\"x3  30\"x3  30\"x3  30\"x3  HEP  " "HEP  30\"x3  30\"x3    Corner Pec Stretch   30\" x3  30\"x3  30\"x3  30\"x3  30\"x3  HEP  HEP  30\"x3  30\"x3                                                                                                    Goal Oriented Functional Activity:                                                Modalities    MHP L Forearm/Wrist   Pre-Tx  5'    5'  5'  5'  5'  5'  5'  5'  5'   CP L Forearm/Wrist   Post- Tx 5'  declined Def to home Def to home Def to home  5'  5'  5'   QR Code:    [facility] HEP:  Access Code: [email or web address]  URL: [email or web address]  Date: 08/13/2024  Prepared by: aKthy Rutledge    Exercises  - Standing Wrist Extension Stretch  - 2-3 x daily - 3 reps - 10 hold  - Standing Wrist Flexion Stretch  - 2-3 x daily - 3 reps - 10 hold    Patient Education  - Office Posture  - What Is De Quervain's Tenosynovitis?  - Ice  - Ice Massage           "

## 2024-10-30 ENCOUNTER — APPOINTMENT (OUTPATIENT)
Dept: PHYSICAL THERAPY | Facility: REHABILITATION | Age: 49
End: 2024-10-30
Payer: COMMERCIAL

## 2024-10-30 ENCOUNTER — HOSPITAL ENCOUNTER (OUTPATIENT)
Dept: RADIOLOGY | Facility: IMAGING CENTER | Age: 49
Discharge: HOME | End: 2024-10-30
Payer: COMMERCIAL

## 2024-10-30 VITALS — HEIGHT: 59 IN | BODY MASS INDEX: 44.15 KG/M2 | WEIGHT: 219 LBS

## 2024-10-30 DIAGNOSIS — Z12.39 ENCOUNTER FOR OTHER SCREENING FOR MALIGNANT NEOPLASM OF BREAST: ICD-10-CM

## 2024-10-30 PROCEDURE — 77063 BREAST TOMOSYNTHESIS BI: CPT

## 2024-10-30 PROCEDURE — 77067 SCR MAMMO BI INCL CAD: CPT

## 2024-11-11 ENCOUNTER — OFFICE VISIT (OUTPATIENT)
Dept: FAMILY MEDICINE CLINIC | Facility: CLINIC | Age: 49
End: 2024-11-11
Payer: COMMERCIAL

## 2024-11-11 VITALS
WEIGHT: 222.6 LBS | OXYGEN SATURATION: 95 % | TEMPERATURE: 97.5 F | SYSTOLIC BLOOD PRESSURE: 128 MMHG | BODY MASS INDEX: 44.88 KG/M2 | HEIGHT: 59 IN | DIASTOLIC BLOOD PRESSURE: 84 MMHG | HEART RATE: 93 BPM

## 2024-11-11 DIAGNOSIS — M54.9 UPPER BACK PAIN: ICD-10-CM

## 2024-11-11 DIAGNOSIS — D68.51 FACTOR V LEIDEN MUTATION (HCC): ICD-10-CM

## 2024-11-11 DIAGNOSIS — Z15.09 MONOALLELIC MUTATION OF MITF GENE: ICD-10-CM

## 2024-11-11 DIAGNOSIS — R73.9 HYPERGLYCEMIA: ICD-10-CM

## 2024-11-11 DIAGNOSIS — E78.5 DYSLIPIDEMIA: ICD-10-CM

## 2024-11-11 DIAGNOSIS — B35.3 TINEA PEDIS OF LEFT FOOT: ICD-10-CM

## 2024-11-11 DIAGNOSIS — Z15.89 MONOALLELIC MUTATION OF MITF GENE: ICD-10-CM

## 2024-11-11 DIAGNOSIS — G62.9 NEUROPATHY: ICD-10-CM

## 2024-11-11 DIAGNOSIS — Z00.00 ANNUAL PHYSICAL EXAM: Primary | ICD-10-CM

## 2024-11-11 PROCEDURE — 99396 PREV VISIT EST AGE 40-64: CPT | Performed by: FAMILY MEDICINE

## 2024-11-11 PROCEDURE — 99213 OFFICE O/P EST LOW 20 MIN: CPT | Performed by: FAMILY MEDICINE

## 2024-11-11 RX ORDER — PRENATAL VIT 91/IRON/FOLIC/DHA 28-975-200
COMBINATION PACKAGE (EA) ORAL 2 TIMES DAILY
Qty: 42 G | Refills: 0 | Status: SHIPPED | OUTPATIENT
Start: 2024-11-11

## 2024-11-11 NOTE — PROGRESS NOTES
Adult Annual Physical  Name: Jyoti Cedeno      : 1975      MRN: [id number]  Encounter Provider: Kodi Mcgowan MD  Encounter Date: 2024   Encounter [facility]    Assessment & Plan  Annual physical exam    Orders:    Lipid panel; Future    Hemoglobin A1C; Future    Comprehensive metabolic panel; Future    CBC and differential; Future    Tinea pedis of left foot    Orders:    terbinafine (LamISIL) 1 % cream; Apply topically 2 (two) times a day    Dyslipidemia    Orders:    Lipid panel; Future    Hyperglycemia    Orders:    Hemoglobin A1C; Future    Neuropathy    Orders:    Comprehensive metabolic panel; Future    CBC and differential; Future    Monoallelic mutation of MITF gene    Orders:    Comprehensive metabolic panel; Future    CBC and differential; Future    Factor V Leiden mutation (HCC)    Orders:    Comprehensive metabolic panel; Future    CBC and differential; Future      Immunizations and preventive care screenings were discussed with patient today. Appropriate education was printed on patient's after visit summary.    Counseling:  Alcohol/drug use: discussed moderation in alcohol intake, the recommendations for healthy alcohol use, and avoidance of illicit drug use.  Dental Health: discussed importance of regular tooth brushing, flossing, and dental visits.  Injury prevention: discussed safety/seat belts, safety helmets, smoke detectors, carbon monoxide detectors, and smoking near bedding or upholstery.  Sexual health: discussed sexually transmitted diseases, partner selection, use of condoms, avoidance of unintended pregnancy, and contraceptive alternatives.  Exercise: the importance of regular exercise/physical activity was discussed. Recommend exercise 3-5 times per week for at least 30 minutes.          History of Present Illness     Adult Annual Physical:  Patient presents for annual physical. Pt is doing well  Doing aqua aerobic twice a  "week  Notices improvement in core strength  Recently was on a cruise, did gain small amount of weight  Planning on increasing exercise, walking with her father on days where she is not participating aquarobics    She has noticed a itchy right foot rash, feels drier than usual.     Diet and Physical Activity:  - Diet/Nutrition: diabetic diet. Patient is not diabetic however his  is and she is eating a diabetic diet together with her   - Exercise: 1-2 times a week on average.    General Health:  - Sleep: sleeps well.  - Hearing: normal hearing bilateral ears.  - Vision: wears glasses.  - Dental: brushes teeth twice daily and regular dental visits.    Review of Systems   Constitutional:  Negative for chills and fever.   HENT:  Negative for congestion, rhinorrhea and sore throat.    Respiratory:  Negative for chest tightness and shortness of breath.    Cardiovascular:  Negative for chest pain.   Gastrointestinal:  Negative for abdominal pain.   Skin:  Positive for rash.   Neurological:  Negative for dizziness, light-headedness and headaches.   Psychiatric/Behavioral:  Negative for sleep disturbance.          Objective     /84 (BP Location: Left arm, Patient Position: Sitting, Cuff Size: Large)   Pulse 93   Temp 97.5 °F (36.4 °C) (Temporal)   Ht 4' 11\" (1.499 m)   Wt 101 kg (222 lb 9.6 oz)   LMP 08/21/2023 (Exact Date) Comment: denies preg  SpO2 95%   BMI 44.96 kg/m²     Physical Exam  Vitals and nursing note reviewed.   Constitutional:       General: She is not in acute distress.     Appearance: She is well-developed.   HENT:      Head: Normocephalic and atraumatic.   Eyes:      Conjunctiva/sclera: Conjunctivae normal.   Cardiovascular:      Rate and Rhythm: Normal rate and regular rhythm.      Heart sounds: No murmur heard.  Pulmonary:      Effort: Pulmonary effort is normal. No respiratory distress.      Breath sounds: Normal breath sounds.   Abdominal:      Palpations: Abdomen is soft.      " "Tenderness: There is no abdominal tenderness.   Musculoskeletal:         General: No swelling.      Cervical back: Neck supple.   Skin:     General: Skin is warm and dry.      Capillary Refill: Capillary refill takes less than 2 seconds.      Findings: Rash present.      Comments: There is some whitish discoloration of the skin between the webbing's of her first second and third toe, Mild erythema   Neurological:      Mental Status: She is alert.   Psychiatric:         Mood and Affect: Mood normal.         Kodi Mcgowan M.D.  Family Medicine    Please excuse any \"sound-alike\" errors that may have ocurred during the process of dictation. Parts of this note have been dictated and there may be errors present in the transcription process. Thank you.    "

## 2024-11-11 NOTE — PATIENT INSTRUCTIONS
"Patient Education     Routine physical for adults   The Basics   Written by the doctors and editors at [facility]   What is a physical? -- A physical is a routine visit, or \"check-up,\" with your doctor. You might also hear it called a \"wellness visit\" or \"preventive visit.\"  During each visit, the doctor will:   Ask about your physical and mental health   Ask about your habits, behaviors, and lifestyle   Do an exam   Give you vaccines if needed   Talk to you about any medicines you take   Give advice about your health   Answer your questions  Getting regular check-ups is an important part of taking care of your health. It can help your doctor find and treat any problems you have. But it's also important for preventing health problems.  A routine physical is different from a \"sick visit.\" A sick visit is when you see a doctor because of a health concern or problem. Since physicals are scheduled ahead of time, you can think about what you want to ask the doctor.  How often should I get a physical? -- It depends on your age and health. In general, for people age 21 years and older:   If you are younger than 50 years, you might be able to get a physical every 3 years.   If you are 50 years or older, your doctor might recommend a physical every year.  If you have an ongoing health condition, like diabetes or high blood pressure, your doctor will probably want to see you more often.  What happens during a physical? -- In general, each visit will include:   Physical exam - The doctor or nurse will check your height, weight, heart rate, and blood pressure. They will also look at your eyes and ears. They will ask about how you are feeling and whether you have any symptoms that bother you.   Medicines - It's a good idea to bring a list of all the medicines you take to each doctor visit. Your doctor will talk to you about your medicines and answer any questions. Tell them if you are having any side effects that bother you. You " "should also tell them if you are having trouble paying for any of your medicines.   Habits and behaviors - This includes:   Your diet   Your exercise habits   Whether you smoke, drink alcohol, or use drugs   Whether you are sexually active   Whether you feel safe at home  Your doctor will talk to you about things you can do to improve your health and lower your risk of health problems. They will also offer help and support. For example, if you want to quit smoking, they can give you advice and might prescribe medicines. If you want to improve your diet or get more physical activity, they can help you with this, too.   Lab tests, if needed - The tests you get will depend on your age and situation. For example, your doctor might want to check your:   Cholesterol   Blood sugar   Iron level   Vaccines - The recommended vaccines will depend on your age, health, and what vaccines you already had. Vaccines are very important because they can prevent certain serious or deadly infections.   Discussion of screening - \"Screening\" means checking for diseases or other health problems before they cause symptoms. Your doctor can recommend screening based on your age, risk, and preferences. This might include tests to check for:   Cancer, such as breast, prostate, cervical, ovarian, colorectal, prostate, lung, or skin cancer   Sexually transmitted infections, such as chlamydia and gonorrhea   Mental health conditions like depression and anxiety  Your doctor will talk to you about the different types of screening tests. They can help you decide which screenings to have. They can also explain what the results might mean.   Answering questions - The physical is a good time to ask the doctor or nurse questions about your health. If needed, they can refer you to other doctors or specialists, too.  Adults older than 65 years often need other care, too. As you get older, your doctor will talk to you about:   How to prevent falling at " home   Hearing or vision tests   Memory testing   How to take your medicines safely   Making sure that you have the help and support you need at home  All topics are updated as new evidence becomes available and our peer review process is complete.  This topic retrieved from [employer] on: May 02, 2024.  Topic 022371 Version 1.0  Release: 32.4.3 - C32.122  © 2024 UpToDate, Inc. and/or its affiliates. All rights reserved.  Consumer Information Use and Disclaimer   Disclaimer: This generalized information is a limited summary of diagnosis, treatment, and/or medication information. It is not meant to be comprehensive and should be used as a tool to help the user understand and/or assess potential diagnostic and treatment options. It does NOT include all information about conditions, treatments, medications, side effects, or risks that may apply to a specific patient. It is not intended to be medical advice or a substitute for the medical advice, diagnosis, or treatment of a health care provider based on the health care provider's examination and assessment of a patient's specific and unique circumstances. Patients must speak with a health care provider for complete information about their health, medical questions, and treatment options, including any risks or benefits regarding use of medications. This information does not endorse any treatments or medications as safe, effective, or approved for treating a specific patient. UpToDate, Inc. and its affiliates disclaim any warranty or liability relating to this information or the use thereof.The use of this information is governed by the Terms of Use, available at https://www.woltersSymvatouwer.com/en/know/clinical-effectiveness-terms. 2024© UpToDate, Inc. and its affiliates and/or licensors. All rights reserved.  Copyright   © 2024 Up[employer]Date, Inc. and/or its affiliates. All rights reserved.

## 2024-11-12 ENCOUNTER — APPOINTMENT (OUTPATIENT)
Dept: LAB | Facility: MEDICAL CENTER | Age: 49
End: 2024-11-12
Payer: COMMERCIAL

## 2024-11-12 DIAGNOSIS — Z00.00 ANNUAL PHYSICAL EXAM: ICD-10-CM

## 2024-11-12 DIAGNOSIS — Z15.89 MONOALLELIC MUTATION OF MITF GENE: ICD-10-CM

## 2024-11-12 DIAGNOSIS — Z15.09 MONOALLELIC MUTATION OF MITF GENE: ICD-10-CM

## 2024-11-12 DIAGNOSIS — G62.9 NEUROPATHY: ICD-10-CM

## 2024-11-12 DIAGNOSIS — R73.9 HYPERGLYCEMIA: ICD-10-CM

## 2024-11-12 DIAGNOSIS — E78.5 DYSLIPIDEMIA: ICD-10-CM

## 2024-11-12 DIAGNOSIS — D68.51 FACTOR V LEIDEN MUTATION (HCC): ICD-10-CM

## 2024-11-12 LAB
ALBUMIN SERPL BCG-MCNC: 4 G/DL (ref 3.5–5)
ALP SERPL-CCNC: 104 U/L (ref 34–104)
ALT SERPL W P-5'-P-CCNC: 21 U/L (ref 7–52)
ANION GAP SERPL CALCULATED.3IONS-SCNC: 10 MMOL/L (ref 4–13)
AST SERPL W P-5'-P-CCNC: 17 U/L (ref 13–39)
BASOPHILS # BLD AUTO: 0.05 THOUSANDS/ÂΜL (ref 0–0.1)
BASOPHILS NFR BLD AUTO: 1 % (ref 0–1)
BILIRUB SERPL-MCNC: 0.31 MG/DL (ref 0.2–1)
BUN SERPL-MCNC: 18 MG/DL (ref 5–25)
CALCIUM SERPL-MCNC: 9.5 MG/DL (ref 8.4–10.2)
CHLORIDE SERPL-SCNC: 104 MMOL/L (ref 96–108)
CHOLEST SERPL-MCNC: 254 MG/DL
CO2 SERPL-SCNC: 29 MMOL/L (ref 21–32)
CREAT SERPL-MCNC: 0.78 MG/DL (ref 0.6–1.3)
EOSINOPHIL # BLD AUTO: 0.23 THOUSAND/ÂΜL (ref 0–0.61)
EOSINOPHIL NFR BLD AUTO: 3 % (ref 0–6)
ERYTHROCYTE [DISTWIDTH] IN BLOOD BY AUTOMATED COUNT: 13.3 % (ref 11.6–15.1)
EST. AVERAGE GLUCOSE BLD GHB EST-MCNC: 131 MG/DL
GFR SERPL CREATININE-BSD FRML MDRD: 89 ML/MIN/1.73SQ M
GLUCOSE P FAST SERPL-MCNC: 108 MG/DL (ref 65–99)
HBA1C MFR BLD: 6.2 %
HCT VFR BLD AUTO: 41.3 % (ref 34.8–46.1)
HDLC SERPL-MCNC: 52 MG/DL
HGB BLD-MCNC: 12.8 G/DL (ref 11.5–15.4)
IMM GRANULOCYTES # BLD AUTO: 0.03 THOUSAND/UL (ref 0–0.2)
IMM GRANULOCYTES NFR BLD AUTO: 0 % (ref 0–2)
LDLC SERPL CALC-MCNC: 174 MG/DL (ref 0–100)
LYMPHOCYTES # BLD AUTO: 2.84 THOUSANDS/ÂΜL (ref 0.6–4.47)
LYMPHOCYTES NFR BLD AUTO: 37 % (ref 14–44)
MCH RBC QN AUTO: 29.8 PG (ref 26.8–34.3)
MCHC RBC AUTO-ENTMCNC: 31 G/DL (ref 31.4–37.4)
MCV RBC AUTO: 96 FL (ref 82–98)
MONOCYTES # BLD AUTO: 0.43 THOUSAND/ÂΜL (ref 0.17–1.22)
MONOCYTES NFR BLD AUTO: 6 % (ref 4–12)
NEUTROPHILS # BLD AUTO: 4.12 THOUSANDS/ÂΜL (ref 1.85–7.62)
NEUTS SEG NFR BLD AUTO: 53 % (ref 43–75)
NONHDLC SERPL-MCNC: 202 MG/DL
NRBC BLD AUTO-RTO: 0 /100 WBCS
PLATELET # BLD AUTO: 332 THOUSANDS/UL (ref 149–390)
PMV BLD AUTO: 11 FL (ref 8.9–12.7)
POTASSIUM SERPL-SCNC: 4.6 MMOL/L (ref 3.5–5.3)
PROT SERPL-MCNC: 7.4 G/DL (ref 6.4–8.4)
RBC # BLD AUTO: 4.29 MILLION/UL (ref 3.81–5.12)
SODIUM SERPL-SCNC: 143 MMOL/L (ref 135–147)
TRIGL SERPL-MCNC: 142 MG/DL
WBC # BLD AUTO: 7.7 THOUSAND/UL (ref 4.31–10.16)

## 2024-11-12 PROCEDURE — 80053 COMPREHEN METABOLIC PANEL: CPT

## 2024-11-12 PROCEDURE — 83036 HEMOGLOBIN GLYCOSYLATED A1C: CPT

## 2024-11-12 PROCEDURE — 80061 LIPID PANEL: CPT

## 2024-11-12 PROCEDURE — 36415 COLL VENOUS BLD VENIPUNCTURE: CPT

## 2024-11-12 PROCEDURE — 85025 COMPLETE CBC W/AUTO DIFF WBC: CPT

## 2024-11-13 RX ORDER — CYCLOBENZAPRINE HCL 5 MG
TABLET ORAL
Qty: 30 TABLET | Refills: 0 | Status: SHIPPED | OUTPATIENT
Start: 2024-11-13

## 2024-11-15 ENCOUNTER — RESULTS FOLLOW-UP (OUTPATIENT)
Dept: FAMILY MEDICINE CLINIC | Facility: CLINIC | Age: 49
End: 2024-11-15

## 2024-11-29 ENCOUNTER — APPOINTMENT (OUTPATIENT)
Dept: URGENT CARE | Age: 49
End: 2024-11-29
Payer: OTHER MISCELLANEOUS

## 2024-11-29 PROCEDURE — 99283 EMERGENCY DEPT VISIT LOW MDM: CPT

## 2024-11-29 PROCEDURE — G0382 LEV 3 HOSP TYPE B ED VISIT: HCPCS

## 2024-11-29 PROCEDURE — 90715 TDAP VACCINE 7 YRS/> IM: CPT

## 2024-11-29 PROCEDURE — 90471 IMMUNIZATION ADMIN: CPT

## 2024-12-01 ENCOUNTER — APPOINTMENT (OUTPATIENT)
Dept: URGENT CARE | Age: 49
End: 2024-12-01
Payer: OTHER MISCELLANEOUS

## 2024-12-01 PROCEDURE — 99213 OFFICE O/P EST LOW 20 MIN: CPT

## 2024-12-08 ENCOUNTER — APPOINTMENT (OUTPATIENT)
Dept: URGENT CARE | Age: 49
End: 2024-12-08
Payer: OTHER MISCELLANEOUS

## 2024-12-08 PROCEDURE — 99213 OFFICE O/P EST LOW 20 MIN: CPT

## 2024-12-16 ENCOUNTER — APPOINTMENT (OUTPATIENT)
Dept: URGENT CARE | Age: 49
End: 2024-12-16
Payer: OTHER MISCELLANEOUS

## 2024-12-16 PROCEDURE — 99213 OFFICE O/P EST LOW 20 MIN: CPT

## 2024-12-18 ENCOUNTER — OFFICE VISIT (OUTPATIENT)
Dept: OBGYN CLINIC | Facility: CLINIC | Age: 49
End: 2024-12-18
Payer: COMMERCIAL

## 2024-12-18 VITALS — HEIGHT: 59 IN | WEIGHT: 222 LBS | BODY MASS INDEX: 44.76 KG/M2

## 2024-12-18 DIAGNOSIS — M18.12 ARTHRITIS OF CARPOMETACARPAL (CMC) JOINT OF LEFT THUMB: ICD-10-CM

## 2024-12-18 DIAGNOSIS — M65.4 DE QUERVAIN'S TENOSYNOVITIS, LEFT: Primary | ICD-10-CM

## 2024-12-18 PROCEDURE — 20550 NJX 1 TENDON SHEATH/LIGAMENT: CPT | Performed by: SURGERY

## 2024-12-18 PROCEDURE — 99213 OFFICE O/P EST LOW 20 MIN: CPT | Performed by: SURGERY

## 2024-12-18 RX ORDER — DEXAMETHASONE SODIUM PHOSPHATE 10 MG/ML
40 INJECTION, SOLUTION INTRAMUSCULAR; INTRAVENOUS
Status: COMPLETED | OUTPATIENT
Start: 2024-12-18 | End: 2024-12-18

## 2024-12-18 RX ORDER — LIDOCAINE HYDROCHLORIDE 10 MG/ML
1 INJECTION, SOLUTION INFILTRATION; PERINEURAL
Status: COMPLETED | OUTPATIENT
Start: 2024-12-18 | End: 2024-12-18

## 2024-12-18 RX ADMIN — LIDOCAINE HYDROCHLORIDE 1 ML: 10 INJECTION, SOLUTION INFILTRATION; PERINEURAL at 14:30

## 2024-12-18 RX ADMIN — DEXAMETHASONE SODIUM PHOSPHATE 40 MG: 10 INJECTION, SOLUTION INTRAMUSCULAR; INTRAVENOUS at 14:30

## 2024-12-18 NOTE — PROGRESS NOTES
Junior Loyola M.D.  Attending, Orthopaedic Surgery  Hand, Wrist, and Elbow Surgery  [facility]      ORTHOPAEDIC HAND, WRIST, AND ELBOW OFFICE  VISIT       ASSESSMENT/PLAN:      49 y.o. female with left De Quervain's tenosynovitis and left thumb CMC arthritis     Left wrist x-rays were reviewed. The etiology of above diagnosis was discussed along with treatment options. She was fit with a comfort cool brace, to be worn as needed with activities. She was advised to use heat to the base of her thumb as well as Voltaren Gel. Voltaren Gel was prescribed and sent to her pharmacy electronically. The decision was made to proceed with a 2nd left De Quervain's CSI. Left De Quervain's CSI was performed in the office without complication. Post injection protocol/expectations were reviewed. We discussed the possibility of a left thumb CMC CSI in the future if pain persists to this area. I will see her back in the office in 6 weeks time, may cancel if she is doing well.      The patient verbalized understanding of exam findings and treatment plan. We engaged in the shared decision-making process and treatment options were discussed at length with the patient. Surgical and conservative management discussed today along with risks and benefits.    Diagnoses and all orders for this visit:    De Quervain's tenosynovitis, left  -     Hand/upper extremity injection: L extensor compartment 1    Arthritis of carpometacarpal (CMC) joint of left thumb  -     Thumb Cude comf/Cool        Follow Up:  Return in about 6 weeks (around 1/29/2025), or if symptoms worsen or fail to improve, for may cancel if doing well .    To Do Next Visit:  Re-evaluation of current issue      General Discussions:  CMC Arthritis: The anatomy and physiology of carpometacarpal joint arthritis was discussed with the patient today in the office.  Deterioration of the articular cartilage eventually leads to hypermobility at the thumb CMC  joint, resulting in joint subluxation, osteophyte formation, cystic changes within the trapezium and base of the first metacarpal, as well as subchondral sclerosis.  Eventually, pain, limited mobility, and compensatory hyperextension at the metacarpophalangeal joint may develop.  While normal activity and usage of the thumb joint may provide a painful experience to the patient, this typically does not result in damage to the thumb or hand.  Treatment options include resting thumb spica splints to decreased joint edema, pain, and inflammation.  Therapy exercises to strengthen the thenar musculature may relieve pain, but do not alter the overall continued development of osteoarthritis.  Oral medications, topical medications, corticosteroid injections may decrease pain and increase overall function.  Eventually, approximately 5% of patients may require surgical intervention.                                                                                                                                                                                                   De Quervain Tenosynovitis: The anatomy and physiology of de Quervain's tenosynovitis was discussed with the patient today in the office.  Edema and increased contact pressure within the first dorsal extensor compartment at the radial styloid can cause pain, crepitation, and limitation of function.  Treatment options include resting thumb spica splints to decrease edema, oral anti-inflammatory medications, home or formal therapy exercises, up to 2 steroid injections within the first dorsal extensor compartment, or surgical release.  While majority of patients do respond to conservative treatment, up to 20% may require surgical release.     ____________________________________________________________________________________________________________________________________________      CHIEF COMPLAINT:  No chief complaint on file.      SUBJECTIVE:  Jyoti RODRÍGUEZ  Pao is a 49 y.o. year old RHD female who presents to the office for left De Quervain's tenosynovitis. I am seeing Jyoti in consultation at the request of Sal Maldonado PA-C. She notes pain to the radial aspect of her left wrist. Pain has been ongoing since May. She did have a lid of a freezer chest fall onto her wrist. She did undergo a De Quervain's CSI on 10/23/24, which was partially beneficial for her. She has been wearing a brace, but not having to wear this much recently as symptoms overall have improved. She did attend therapy for her wrist and continues to perform exercises at home.      Pain/symptom timing:  Worse during the day when active  Pain/symptom context:  Worse with activites and work  Pain/symptom modifying factors:  Rest makes better, activities make worse  Pain/symptom associated signs/symptoms: none    Prior treatment   NSAIDsNo   Injections Yes   Bracing/Orthotics Yes    Physical Therapy Yes     I have personally reviewed all the relevant PMH, PSH, SH, FH, Medications and allergies      PAST MEDICAL HISTORY:  Past Medical History:   Diagnosis Date    Asthma 01/1993    Complex partial epilepsy (HCC)     Seisure free since age 18     Diverticulitis of colon 11/92    Epilepsy (HCC)     Factor 5 Leiden mutation, heterozygous (HCC)     Fracture, patella     Lupus 3/29/2002    Pregnancy     Resulted in 1 Living child     Seizures ([facility]) 11/92    TIA (transient ischemic attack) 1/1/93       PAST SURGICAL HISTORY:  Past Surgical History:   Procedure Laterality Date    ABDOMINAL ADHESION SURGERY N/A 09/29/2023    Procedure: LYSIS ADHESIONS;  Surgeon: Maite Maldonado MD;  [facility];  Service: Gynecology    BACK SURGERY      BREAST EXCISIONAL BIOPSY Left 02/14/2004    benign    HYSTERECTOMY  9/29/23    MAMMO STEREOTACTIC BREAST BIOPSY LEFT (ALL INC) Left 03/08/2023    NASAL SEPTUM SURGERY      Deviation Repair     [place] CYSTOURETHROSCOPY N/A 09/29/2023    Procedure: CYSTOSCOPY;  Surgeon:  Maite Maldonado MD;  [facility];  Service: Gynecology    SC LAPS TOTAL HYSTERECT 250 GM/< W/RMVL TUBE/OVARY Bilateral 09/29/2023    Procedure: HYSTERECTOMY LAPAROSCOPIC TOTAL  WITH BILATERAL SALPINGO-OOPHERECTOMY;  Surgeon: Maite Maldonado MD;  [facility];  Service: Gynecology    RHINOPLASTY         FAMILY HISTORY:  Family History   Problem Relation Age of Onset    Liver cancer Mother         age dx unknown    Colon cancer Mother 74    Cancer Mother         Liver and lung cancer    Heart attack Father     Hypertension Father     Heart disease Father     No Known Problems Maternal Grandmother     No Known Problems Maternal Grandfather     Breast cancer Paternal Grandmother         age dx unknown    Colon cancer Paternal Grandmother         age dx unknown    Stomach cancer Paternal Grandfather         age dx unknown    Multiple myeloma Brother     No Known Problems Brother     No Known Problems Brother     No Known Problems Son     Breast cancer Maternal Aunt         age dx unknown    No Known Problems Maternal Aunt     No Known Problems Maternal Aunt     No Known Problems Paternal Aunt     Ovarian cancer Neg Hx     Uterine cancer Neg Hx     Cervical cancer Neg Hx        SOCIAL HISTORY:  Social History     Tobacco Use    Smoking status: Never    Smokeless tobacco: Never   Vaping Use    Vaping status: Never Used   Substance Use Topics    Alcohol use: Yes     Alcohol/week: 7.0 standard drinks of alcohol     Types: 5 Glasses of wine, 2 Shots of liquor per week     Comment: 1-2 glasses of wine/ day    Drug use: Never       MEDICATIONS:    Current Outpatient Medications:     albuterol (ProAir HFA) 90 mcg/act inhaler, Inhale 2 puffs every 4 (four) hours as needed for wheezing or shortness of breath, Disp: 18 g, Rfl: 3    calcium carbonate (OS-MELISSA) 1250 (500 Ca) MG tablet, Take 1 tablet (1,250 mg total) by mouth daily, Disp: 150 tablet, Rfl: 3    cyclobenzaprine (FLEXERIL) 5 mg tablet, TAKE 1 TO 2  TABLETS 3 TIMESA DAY AS NEEDED FOR MUSCLE SPASMS, Disp: 30 tablet, Rfl: 0    ergocalciferol (VITAMIN D2) 50,000 units, TAKE 1 CAPSULE BY MOUTH ONE TIME PER WEEK, Disp: 12 capsule, Rfl: 0    gabapentin (NEURONTIN) 100 mg capsule, Take 1 capsule (100 mg total) by mouth daily at bedtime, Disp: 90 capsule, Rfl: 1    ondansetron (ZOFRAN) 4 mg tablet, Take 1 tablet (4 mg total) by mouth every 8 (eight) hours as needed for nausea or vomiting for up to 12 doses, Disp: 12 tablet, Rfl: 0    psyllium (METAMUCIL) 0.52 g capsule, Take 0.52 g by mouth in the morning., Disp: , Rfl:     Red Yeast Rice 600 MG TABS, Take by mouth, Disp: , Rfl:     sertraline (ZOLOFT) 50 mg tablet, Take 1 tablet (50 mg total) by mouth daily, Disp: 90 tablet, Rfl: 1    Spacer/Aero Chamber Mouthpiece (SPACER DEVICE) for metered dose inhaler, For use with metered dose inhaler. [employer] [facility], Disp: 1 Device, Rfl: 0    terbinafine (LamISIL) 1 % cream, Apply topically 2 (two) times a day, Disp: 42 g, Rfl: 0    sucralfate (CARAFATE) 1 g tablet, Take 1 tablet (1 g total) by mouth 4 (four) times a day as needed (Abdominal pain) for up to 5 doses (Patient not taking: Reported on 12/18/2024), Disp: 5 tablet, Rfl: 0    ALLERGIES:  Allergies   Allergen Reactions    Carbamazepine Hives    Cephalexin Hives    Flurbiprofen Fatigue    Phenytoin Hives           REVIEW OF SYSTEMS:  Review of Systems   Constitutional:  Negative for chills, fever and unexpected weight change.   HENT:  Negative for hearing loss, nosebleeds and sore throat.    Eyes:  Negative for pain, redness and visual disturbance.   Respiratory:  Negative for cough, shortness of breath and wheezing.    Cardiovascular:  Negative for chest pain, palpitations and leg swelling.   Gastrointestinal:  Negative for abdominal pain, nausea and vomiting.   Endocrine: Negative for polydipsia and polyuria.   Genitourinary:  Negative for difficulty urinating and hematuria.   Musculoskeletal:  Negative for  arthralgias, joint swelling and myalgias.   Skin:  Negative for rash and wound.   Neurological:  Negative for dizziness, numbness and headaches.   Psychiatric/Behavioral:  Negative for decreased concentration, dysphoric mood and suicidal ideas. The patient is not nervous/anxious.        VITALS:  There were no vitals filed for this visit.    LABS:      _____________________________________________________  PHYSICAL EXAMINATION:  General: well developed and well nourished, alert, oriented times 3, and appears comfortable  Psychiatric: Normal  HEENT: Normocephalic, Atraumatic Trachea Midline, No torticollis  Pulmonary: No audible wheezing or respiratory distress   Abdomen/GI: Non tender, non distended   Cardiovascular: No pitting edema, 2+ radial pulse   Skin: No masses, erythema, lacerations, fluctation, ulcerations  Neurovascular: Sensation Intact to the Median, Ulnar, Radial Nerve, Motor Intact to the Median, Ulnar, Radial Nerve, and Pulses Intact  Musculoskeletal: Normal, except as noted in detailed exam and in HPI.      MUSCULOSKELETAL EXAMINATION:    Left wrist/thumb:     No erythema, ecchymosis or edema  1st dorsal compartment tenderness  Pain with resisted thumb abduction   Pain with thumb extension   No thumb MP or PIP joint tenderness   2nd metacarpal tenderness   Full digital extension   Full composite fist     ___________________________________________________  STUDIES REVIEWED:  I have personally reviewed and interpreted AP lateral and oblique radiographs of left wrist which demonstrate no acute fracture or dislocation. Degenerative changes noted to the thumb CMC joint.         LABS REVIEWED:    HgA1c:   Lab Results   Component Value Date    HGBA1C 6.2 (H) 11/12/2024     BMP:   Lab Results   Component Value Date    CALCIUM 9.5 11/12/2024     01/19/2017    K 4.6 11/12/2024    CO2 29 11/12/2024     11/12/2024    BUN 18 11/12/2024    CREATININE 0.78 11/12/2024               PROCEDURES  PERFORMED:  Hand/upper extremity injection: L extensor compartment 1  Universal Protocol:  procedure performed by consultantConsent: Verbal consent obtained. Written consent not obtained.  Risks and benefits: risks, benefits and alternatives were discussed  Consent given by: patient  Patient understanding: patient states understanding of the procedure being performed  Site marked: the operative site was marked  Patient identity confirmed: verbally with patient  Supporting Documentation  Indications: pain   Procedure Details  Condition:de Quervain's tenosynovitis Site: L extensor compartment 1   Preparation: Patient was prepped and draped in the usual sterile fashion  Needle size: 25 G  Ultrasound guidance: no  Medications administered: 1 mL lidocaine 1 %; 40 mg dexamethasone 100 mg/10 mL  Patient tolerance: patient tolerated the procedure well with no immediate complications  Dressing:  Sterile dressing applied              _____________________________________________________      Scribe Attestation      I,:  Kaity Khan MA am acting as a scribe while in the presence of the attending physician.:       I,:  Junior Loyola MD personally performed the services described in this documentation    as scribed in my presence.:

## 2024-12-23 ENCOUNTER — TELEPHONE (OUTPATIENT)
Dept: HEMATOLOGY ONCOLOGY | Facility: CLINIC | Age: 49
End: 2024-12-23

## 2024-12-23 NOTE — TELEPHONE ENCOUNTER
Called and left a voicemail for patient. Her appointment was rescheduled per Dr. Buckley to March 28, 2025 @ 140 pm. If this date and time doesn't work please call our office back at [phone].      Thank you

## 2025-02-16 DIAGNOSIS — G62.9 NEUROPATHY: ICD-10-CM

## 2025-02-16 DIAGNOSIS — M54.9 UPPER BACK PAIN: ICD-10-CM

## 2025-02-17 RX ORDER — GABAPENTIN 100 MG/1
100 CAPSULE ORAL
Qty: 90 CAPSULE | Refills: 1 | Status: SHIPPED | OUTPATIENT
Start: 2025-02-17

## 2025-02-17 RX ORDER — CYCLOBENZAPRINE HCL 5 MG
5 TABLET ORAL 3 TIMES DAILY PRN
Qty: 30 TABLET | Refills: 0 | Status: SHIPPED | OUTPATIENT
Start: 2025-02-17

## 2025-02-27 ENCOUNTER — PATIENT MESSAGE (OUTPATIENT)
Dept: FAMILY MEDICINE CLINIC | Facility: CLINIC | Age: 50
End: 2025-02-27

## 2025-02-27 ENCOUNTER — NURSE TRIAGE (OUTPATIENT)
Age: 50
End: 2025-02-27

## 2025-02-27 NOTE — TELEPHONE ENCOUNTER
"Reason for Disposition  • Caused by overuse from recent vigorous activity (e.g., exercise, gardening, lifting and carrying, sports)    Answer Assessment - Initial Assessment Questions  1. ONSET: \"When did the pain begin?\" (e.g., minutes, hours, days)      A weeks and a half ago   2. LOCATION: \"Where does it hurt?\" (upper, mid or lower back)      Bellow the bra stripe to the waist   3. SEVERITY: \"How bad is the pain?\"  (e.g., Scale 1-10; mild, moderate, or severe)      5  4. PATTERN: \"Is the pain constant?\" (e.g., yes, no; constant, intermittent)       Constant   5. RADIATION: \"Does the pain shoot into your legs or somewhere else?\"      No   6. CAUSE:  \"What do you think is causing the back pain?\"       Patient thinks that this is due to muscle pool. Patient takes swimming classes.   7. BACK OVERUSE:  \"Any recent lifting of heavy objects, strenuous work or exercise?\"      Possible   8. MEDICINES: \"What have you taken so far for the pain?\" (e.g., nothing, acetaminophen, NSAIDS)      Tylenol   9. NEUROLOGIC SYMPTOMS: \"Do you have any weakness, numbness, or problems with bowel/bladder control?\"      No   10. OTHER SYMPTOMS: \"Do you have any other symptoms?\" (e.g., fever, abdomen pain, burning with urination, blood in urine)        No   11. PREGNANCY: \"Is there any chance you are pregnant?\" \"When was your last menstrual period?\"        No, hysterectomy.    Protocols used: Back Pain-Adult-    "

## 2025-02-28 DIAGNOSIS — M62.838 MUSCLE SPASM: Primary | ICD-10-CM

## 2025-02-28 DIAGNOSIS — M54.50 LOW BACK PAIN, UNSPECIFIED BACK PAIN LATERALITY, UNSPECIFIED CHRONICITY, UNSPECIFIED WHETHER SCIATICA PRESENT: ICD-10-CM

## 2025-02-28 RX ORDER — IBUPROFEN 600 MG/1
600 TABLET, FILM COATED ORAL EVERY 8 HOURS PRN
Qty: 90 TABLET | Refills: 1 | Status: SHIPPED | OUTPATIENT
Start: 2025-02-28

## 2025-03-20 ENCOUNTER — OFFICE VISIT (OUTPATIENT)
Dept: FAMILY MEDICINE CLINIC | Facility: CLINIC | Age: 50
End: 2025-03-20
Payer: COMMERCIAL

## 2025-03-20 VITALS
HEART RATE: 96 BPM | SYSTOLIC BLOOD PRESSURE: 128 MMHG | WEIGHT: 225.2 LBS | OXYGEN SATURATION: 98 % | BODY MASS INDEX: 45.4 KG/M2 | HEIGHT: 59 IN | TEMPERATURE: 97.8 F | DIASTOLIC BLOOD PRESSURE: 82 MMHG

## 2025-03-20 DIAGNOSIS — G89.29 CHRONIC PAIN OF RIGHT ANKLE: Primary | ICD-10-CM

## 2025-03-20 DIAGNOSIS — M25.571 CHRONIC PAIN OF RIGHT ANKLE: Primary | ICD-10-CM

## 2025-03-20 PROCEDURE — 99214 OFFICE O/P EST MOD 30 MIN: CPT | Performed by: FAMILY MEDICINE

## 2025-03-20 NOTE — PROGRESS NOTES
"Name: Jyoti Cedeno      : 1975      MRN: [id number]  Encounter Provider: Kodi Mcgowan MD  Encounter Date: 3/20/2025   Encounter department: [facility]    Assessment & Plan  Chronic pain of right ankle  Concern for possible old injury, may be soft tissue in nature as pain is more significant with excessive dorsiflexion  Obtain x-ray evaluation, encourage patient to use a ankle sleeve for added compression and stability  May use ice over the affected area  Scheduled Tylenol 500 mg 2 tablets twice a day for the next 2 weeks  If there is significant worsening pain consider evaluation by podiatry            History of Present Illness       Ankle Pain     Ankle Swelling  Associated symptoms include arthralgias. Pertinent negatives include no abdominal pain, chest pain, chills, congestion, fever, headaches or sore throat. The symptoms are aggravated by movement.       Jyoti is a 49-year-old female patient presents for evaluation regarding right-sided ankle pain.  Patient reports the pain started around 2024 after a fall.  Initially patient did not seek medical evaluation as her knee was having more pain.  Her knee seems to be improving however she continues to have pain involving the right ankle.  Specifically patient states    \"Every time I get in bed, if my foot get caught in the blanket, it causes a horrible burning sensation\".    Overall only significant pain is associated with excessive dorsiflexion with the right leg.  Patient reports without weightbearing on a neutral position pain is about 1 out of 10.      Review of Systems   Constitutional:  Negative for chills and fever.   HENT:  Negative for congestion, rhinorrhea and sore throat.    Respiratory:  Negative for chest tightness and shortness of breath.    Cardiovascular:  Negative for chest pain.   Gastrointestinal:  Negative for abdominal pain.   Genitourinary:  Negative for difficulty urinating, dysuria and " enuresis.   Musculoskeletal:  Positive for arthralgias.   Neurological:  Negative for dizziness, light-headedness and headaches.         Past Medical History:   Diagnosis Date    Asthma 01/1993    Complex partial epilepsy (HCC)     Seisure free since age 18     Diverticulitis of colon 11/92    Epilepsy (HCC)     Factor 5 Leiden mutation, heterozygous (HCC)     Fracture, patella     Lupus 3/29/2002    Pregnancy     Resulted in 1 Living child     Seizures (HCC) 11/92    TIA (transient ischemic attack) 1/1/93     Past Surgical History:   Procedure Laterality Date    ABDOMINAL ADHESION SURGERY N/A 09/29/2023    Procedure: LYSIS ADHESIONS;  Surgeon: Maite Maldonado MD;  [facility];  Service: Gynecology    BACK SURGERY      BREAST EXCISIONAL BIOPSY Left 02/14/2004    benign    HYSTERECTOMY  9/29/23    MAMMO STEREOTACTIC BREAST BIOPSY LEFT (ALL INC) Left 03/08/2023    NASAL SEPTUM SURGERY      Deviation Repair     [place] CYSTOURETHROSCOPY N/A 09/29/2023    Procedure: CYSTOSCOPY;  Surgeon: Maite Maldonado MD;  [facility];  Service: Gynecology    TN LAPS TOTAL HYSTERECT 250 GM/< W/RMVL TUBE/OVARY Bilateral 09/29/2023    Procedure: HYSTERECTOMY LAPAROSCOPIC TOTAL  WITH BILATERAL SALPINGO-OOPHERECTOMY;  Surgeon: Maite Maldonado MD;  [facility];  Service: Gynecology    RHINOPLASTY       Family History   Problem Relation Age of Onset    Liver cancer Mother         age dx unknown    Colon cancer Mother 74    Cancer Mother         Liver and lung cancer    Heart attack Father     Hypertension Father     Heart disease Father     No Known Problems Maternal Grandmother     No Known Problems Maternal Grandfather     Breast cancer Paternal Grandmother         age dx unknown    Colon cancer Paternal Grandmother         age dx unknown    Stomach cancer Paternal Grandfather         age dx unknown    Multiple myeloma Brother     No Known Problems Brother     No Known Problems Brother     No Known Problems Son      Breast cancer Maternal Aunt         age dx unknown    No Known Problems Maternal Aunt     No Known Problems Maternal Aunt     No Known Problems Paternal Aunt     Ovarian cancer Neg Hx     Uterine cancer Neg Hx     Cervical cancer Neg Hx      Social History     Tobacco Use    Smoking status: Never    Smokeless tobacco: Never   Vaping Use    Vaping status: Never Used   Substance and Sexual Activity    Alcohol use: Yes     Alcohol/week: 7.0 standard drinks of alcohol     Types: 5 Glasses of wine, 2 Shots of liquor per week     Comment: 1-2 glasses of wine/ day    Drug use: Never    Sexual activity: Yes     Partners: Male     Birth control/protection: Female Sterilization     Comment:      Current Outpatient Medications on File Prior to Visit   Medication Sig    albuterol (ProAir HFA) 90 mcg/act inhaler Inhale 2 puffs every 4 (four) hours as needed for wheezing or shortness of breath    calcium carbonate (OS-MELISSA) 1250 (500 Ca) MG tablet Take 1 tablet (1,250 mg total) by mouth daily    cyclobenzaprine (FLEXERIL) 5 mg tablet Take 1 tablet (5 mg total) by mouth 3 (three) times a day as needed for muscle spasms    Diclofenac Sodium (VOLTAREN) 1 % Apply 2 g topically 4 (four) times a day    ergocalciferol (VITAMIN D2) 50,000 units TAKE 1 CAPSULE BY MOUTH ONE TIME PER WEEK    gabapentin (NEURONTIN) 100 mg capsule Take 1 capsule (100 mg total) by mouth daily at bedtime    ibuprofen (MOTRIN) 600 mg tablet Take 1 tablet (600 mg total) by mouth every 8 (eight) hours as needed for moderate pain    ondansetron (ZOFRAN) 4 mg tablet Take 1 tablet (4 mg total) by mouth every 8 (eight) hours as needed for nausea or vomiting for up to 12 doses    psyllium (METAMUCIL) 0.52 g capsule Take 0.52 g by mouth in the morning.    Red Yeast Rice 600 MG TABS Take by mouth    sertraline (ZOLOFT) 50 mg tablet Take 1 tablet (50 mg total) by mouth daily    Spacer/Aero Chamber Mouthpiece (SPACER DEVICE) for metered dose inhaler For use with  "metered dose inhaler. [facility]    terbinafine (LamISIL) 1 % cream Apply topically 2 (two) times a day    sucralfate (CARAFATE) 1 g tablet Take 1 tablet (1 g total) by mouth 4 (four) times a day as needed (Abdominal pain) for up to 5 doses (Patient not taking: Reported on 8/3/2024)     Allergies   Allergen Reactions    Carbamazepine Hives    Cephalexin Hives    Flurbiprofen Fatigue    Phenytoin Hives     Immunization History   Administered Date(s) Administered    COVID-19 PFIZER VACCINE 0.3 ML IM 04/25/2021, 05/16/2021, 12/26/2021    COVID-19 Pfizer mRNA vacc PF joey-sucrose 12 yr and older (Comirnaty) 11/19/2023, 10/06/2024    Hep B, Adolescent or Pediatric 09/15/2014, 10/15/2014, 04/18/2015    Hep B, adult 09/15/2014    INFLUENZA 09/26/2014, 09/19/2015, 09/24/2015, 11/16/2016, 10/12/2018, 10/10/2020, 12/02/2021, 11/19/2023    Influenza Injectable, MDCK, Preservative Free, 0.5 mL 10/06/2024    Influenza Injectable, MDCK, Preservative Free, Quadrivalent, 0.5 mL 11/19/2023    Influenza Quadrivalent Preservative Free 3 years and older IM 11/16/2016    Influenza, seasonal, injectable 09/28/2013, 09/19/2015, 09/24/2015    Influenza, seasonal, injectable, preservative free 09/26/2014    Tuberculin Skin Test-PPD Intradermal 01/27/2017     Objective   /82 (BP Location: Right arm, Patient Position: Sitting, Cuff Size: Standard)   Pulse 96   Temp 97.8 °F (36.6 °C)   Ht 4' 11\" (1.499 m)   Wt 102 kg (225 lb 3.2 oz)   LMP 08/21/2023 (Exact Date) Comment: denies preg  SpO2 98%   Breastfeeding No   BMI 45.48 kg/m²     Physical Exam  Vitals reviewed.   Constitutional:       General: She is not in acute distress.     Appearance: Normal appearance. She is obese. She is not ill-appearing, toxic-appearing or diaphoretic.   Cardiovascular:      Rate and Rhythm: Normal rate.      Pulses: Normal pulses.   Pulmonary:      Effort: Pulmonary effort is normal.   Abdominal:      General: Abdomen is flat. " "  Musculoskeletal:         General: No swelling or deformity.      Comments: Mild swelling in the Les. Non pitting    Skin:     General: Skin is warm and dry.      Capillary Refill: Capillary refill takes less than 2 seconds.      Coloration: Skin is not jaundiced.   Neurological:      General: No focal deficit present.      Mental Status: She is alert.   Psychiatric:         Mood and Affect: Mood normal.         Kodi Mcgowan M.D.  Family Medicine    Please excuse any \"sound-alike\" errors that may have ocurred during the process of dictation. Parts of this note have been dictated and there may be errors present in the transcription process. Thank you.    "

## 2025-04-11 ENCOUNTER — OFFICE VISIT (OUTPATIENT)
Dept: HEMATOLOGY ONCOLOGY | Facility: CLINIC | Age: 50
End: 2025-04-11
Payer: COMMERCIAL

## 2025-04-11 VITALS
HEIGHT: 59 IN | SYSTOLIC BLOOD PRESSURE: 122 MMHG | BODY MASS INDEX: 44.35 KG/M2 | DIASTOLIC BLOOD PRESSURE: 82 MMHG | RESPIRATION RATE: 16 BRPM | TEMPERATURE: 97.8 F | WEIGHT: 220 LBS | HEART RATE: 69 BPM | OXYGEN SATURATION: 98 %

## 2025-04-11 DIAGNOSIS — Z15.89 MONOALLELIC MUTATION OF MITF GENE: Primary | ICD-10-CM

## 2025-04-11 DIAGNOSIS — Z15.09 MONOALLELIC MUTATION OF MITF GENE: Primary | ICD-10-CM

## 2025-04-11 PROCEDURE — 99213 OFFICE O/P EST LOW 20 MIN: CPT | Performed by: INTERNAL MEDICINE

## 2025-04-11 NOTE — PROGRESS NOTES
Name: Jyoti Cedeno      : 1975      MRN: [id number]  Encounter Provider: Staci Buckley DO  Encounter Date: 2025   Encounter department: [facility] [place]  :  Assessment & Plan  Monoallelic mutation of MITF gene    Orders:    Ambulatory Referral to Dermatology; Future    US kidney and bladder; Future    Ordered a renal ultrasound for screening for kidney cancer.  I will have my office call the dermatology office for an appointment for skin check as there is an increased risk of melanoma with this gene mutation.  I told her that if she does not get an appointment within the next few weeks to please call my office so that I can advocate for her.  She is up-to-date on all her regular cancer screening.  I will continue to see her on a yearly basis to order the renal ultrasound.    Return in about 1 year (around 2026) for 20 minute follow up visit.    History of Present Illness   Chief Complaint   Patient presents with    Follow-up     50-year-old female with an MITF pathogenic mutation.  She also has a history of factor V Leiden gene mutation.  After our last visit she had a CT scan that showed a kidney stone.  A few weeks after that she had a repeat CT scan when she was having abdominal pain in the ER and it look like the stone had passed.  Since her last visit she has been unable to get an appointment with our dermatology group.  She has called multiple times and been put on a waiting list.  She has not noticed any moles that she is concerned about.  She has had no blood in her urine or flank pain.  No other new medical problems since our last visit.  She is due for colonoscopy next year.  She is up-to-date on her mammogram.    Pertinent Medical History      Review of Systems otherwise neg        Objective   /82 (BP Location: Left arm, Patient Position: Sitting, Cuff Size: Adult)   Pulse 69   Temp 97.8 °F (36.6 °C) (Temporal)   Resp 16   Ht 4'  "11\" (1.499 m)   Wt 99.8 kg (220 lb)   LMP 08/21/2023 (Exact Date) Comment: denies preg  SpO2 98%   BMI 44.43 kg/m²     Pain Screening:  Pain Score: 0-No pain  ECOG   0  Physical Exam    GEN: Alert, awake oriented x3, in no acute distress  HEENT- No pallor, icterus, cyanosis, no oral mucosal lesions,   LAD - no palpable cervical, clavicle, axillary, inguinal LAD  Heart- normal S1 S2, regular rate and rhythm, No murmur, rubs.   Lungs- clear breathing sound bilateral.   Abdomen- soft, Non tender, bowel sounds present  Extremities- No cyanosis, clubbing, edema  Neuro- No focal neurological deficit      Labs: I have reviewed the following labs:  Lab Results   Component Value Date/Time    WBC 7.70 11/12/2024 09:49 AM    RBC 4.29 11/12/2024 09:49 AM    Hemoglobin 12.8 11/12/2024 09:49 AM    Hematocrit 41.3 11/12/2024 09:49 AM    MCV 96 11/12/2024 09:49 AM    MCH 29.8 11/12/2024 09:49 AM    RDW 13.3 11/12/2024 09:49 AM    Platelets 332 11/12/2024 09:49 AM    Segmented % 53 11/12/2024 09:49 AM    Lymphocytes % 37 11/12/2024 09:49 AM    Monocytes % 6 11/12/2024 09:49 AM    Eosinophils Relative 3 11/12/2024 09:49 AM    Basophils Relative 1 11/12/2024 09:49 AM    Immature Grans % 0 11/12/2024 09:49 AM    Absolute Neutrophils 4.12 11/12/2024 09:49 AM     Lab Results   Component Value Date/Time    Potassium 4.6 11/12/2024 09:49 AM    Chloride 104 11/12/2024 09:49 AM    CO2 29 11/12/2024 09:49 AM    BUN 18 11/12/2024 09:49 AM    Creatinine 0.78 11/12/2024 09:49 AM    Glucose, Fasting 108 (H) 11/12/2024 09:49 AM    Calcium 9.5 11/12/2024 09:49 AM    AST 17 11/12/2024 09:49 AM    ALT 21 11/12/2024 09:49 AM    Alkaline Phosphatase 104 11/12/2024 09:49 AM    Total Protein 7.4 11/12/2024 09:49 AM    Albumin 4.0 11/12/2024 09:49 AM    Total Bilirubin 0.31 11/12/2024 09:49 AM    eGFR 89 11/12/2024 09:49 AM       Radiology Results Review: I have reviewed radiology reports from jan 2024 including: CT abdomen/pelvis.      "

## 2025-04-21 ENCOUNTER — HOSPITAL ENCOUNTER (OUTPATIENT)
Dept: ULTRASOUND IMAGING | Facility: HOSPITAL | Age: 50
Discharge: HOME | End: 2025-04-21
Attending: INTERNAL MEDICINE
Payer: COMMERCIAL

## 2025-04-21 DIAGNOSIS — Z15.89 MONOALLELIC MUTATION OF MITF GENE: ICD-10-CM

## 2025-04-21 DIAGNOSIS — Z15.09 MONOALLELIC MUTATION OF MITF GENE: ICD-10-CM

## 2025-04-21 PROCEDURE — 76775 US EXAM ABDO BACK WALL LIM: CPT

## 2025-05-05 ENCOUNTER — CONSULT (OUTPATIENT)
Dept: DERMATOLOGY | Facility: CLINIC | Age: 50
End: 2025-05-05
Payer: COMMERCIAL

## 2025-05-05 VITALS — TEMPERATURE: 96 F | BODY MASS INDEX: 43.83 KG/M2 | WEIGHT: 217 LBS

## 2025-05-05 DIAGNOSIS — Z15.09 MONOALLELIC MUTATION OF MITF GENE: ICD-10-CM

## 2025-05-05 DIAGNOSIS — D22.60 MULTIPLE BENIGN MELANOCYTIC NEVI OF UPPER EXTREMITY, LOWER EXTREMITY, AND TRUNK: ICD-10-CM

## 2025-05-05 DIAGNOSIS — Z15.89 MONOALLELIC MUTATION OF MITF GENE: ICD-10-CM

## 2025-05-05 DIAGNOSIS — L82.1 SEBORRHEIC KERATOSES: ICD-10-CM

## 2025-05-05 DIAGNOSIS — L81.4 LENTIGINES: ICD-10-CM

## 2025-05-05 DIAGNOSIS — D22.5 MULTIPLE BENIGN MELANOCYTIC NEVI OF UPPER EXTREMITY, LOWER EXTREMITY, AND TRUNK: ICD-10-CM

## 2025-05-05 DIAGNOSIS — D18.01 CHERRY ANGIOMA: Primary | ICD-10-CM

## 2025-05-05 DIAGNOSIS — D22.70 MULTIPLE BENIGN MELANOCYTIC NEVI OF UPPER EXTREMITY, LOWER EXTREMITY, AND TRUNK: ICD-10-CM

## 2025-05-05 PROCEDURE — 99203 OFFICE O/P NEW LOW 30 MIN: CPT | Performed by: REGISTERED NURSE

## 2025-05-05 NOTE — PROGRESS NOTES
"[facility] Dermatology Clinic Note     Patient Name: Jyoti Cedeno  Encounter Date: 5/5/25       Have you been cared for by a [facility] Dermatologist in the last 3 years and, if so, which description applies to you? NO. I am considered a \"new\" patient and must complete all patient intake questions. I am of child-bearing potential.     REVIEW OF SYSTEMS:  Have you recently had or currently have any of the following? Recent fever or chills? No  Any non-healing wound? No  Are you pregnant or planning to become pregnant? No  Are you currently or planning to be nursing or breast feeding? No   PAST MEDICAL HISTORY:  Have you personally ever had or currently have any of the following?  If \"YES,\" then please provide more detail. Skin cancer (such as Melanoma, Basal Cell Carcinoma, Squamous Cell Carcinoma?  No  Tuberculosis, HIV/AIDS, Hepatitis B or C: No  Radiation Treatment No   HISTORY OF IMMUNOSUPPRESSION:   Do you have a history of any of the following:  Systemic Immunosuppression such as Diabetes, Biologic or Immunotherapy, Chemotherapy, Organ Transplantation, Bone Marrow Transplantation or Prednsione?  No    Answering \"YES\" requires the addition of the dotphrase \"IMMUNOSUPPRESSED\" as the first diagnosis of the patient's visit.   FAMILY HISTORY:  Any \"first degree relatives\" (parent, brother, sister, or child) with the following?    Skin Cancer, Pancreatic or Other Cancer? YES, mother - colon cancer   PATIENT EXPERIENCE:    Do you want the Dermatologist to perform a COMPLETE skin exam today including a clinical examination under the \"bra and underwear\" areas?  Yes  If necessary, do we have your permission to call and leave a detailed message on your Preferred Phone number that includes your specific medical information?  Yes      Allergies   Allergen Reactions    Carbamazepine Hives    Cephalexin Hives    Flurbiprofen Fatigue    Phenytoin Hives      Current Outpatient Medications:     albuterol (ProAir HFA) 90 " mcg/act inhaler, Inhale 2 puffs every 4 (four) hours as needed for wheezing or shortness of breath, Disp: 18 g, Rfl: 3    calcium carbonate (OS-MELISSA) 1250 (500 Ca) MG tablet, Take 1 tablet (1,250 mg total) by mouth daily, Disp: 150 tablet, Rfl: 3    cyclobenzaprine (FLEXERIL) 5 mg tablet, Take 1 tablet (5 mg total) by mouth 3 (three) times a day as needed for muscle spasms, Disp: 30 tablet, Rfl: 0    Diclofenac Sodium (VOLTAREN) 1 %, Apply 2 g topically 4 (four) times a day, Disp: 240 g, Rfl: 1    ergocalciferol (VITAMIN D2) 50,000 units, TAKE 1 CAPSULE BY MOUTH ONE TIME PER WEEK, Disp: 12 capsule, Rfl: 0    gabapentin (NEURONTIN) 100 mg capsule, Take 1 capsule (100 mg total) by mouth daily at bedtime, Disp: 90 capsule, Rfl: 1    ibuprofen (MOTRIN) 600 mg tablet, Take 1 tablet (600 mg total) by mouth every 8 (eight) hours as needed for moderate pain, Disp: 90 tablet, Rfl: 1    ondansetron (ZOFRAN) 4 mg tablet, Take 1 tablet (4 mg total) by mouth every 8 (eight) hours as needed for nausea or vomiting for up to 12 doses, Disp: 12 tablet, Rfl: 0    psyllium (METAMUCIL) 0.52 g capsule, Take 0.52 g by mouth in the morning., Disp: , Rfl:     Red Yeast Rice 600 MG TABS, Take by mouth, Disp: , Rfl:     sertraline (ZOLOFT) 50 mg tablet, Take 1 tablet (50 mg total) by mouth daily, Disp: 90 tablet, Rfl: 1    Spacer/Aero Chamber Mouthpiece (SPACER DEVICE) for metered dose inhaler, For use with metered dose inhaler. Diamond Hernandez VHC, Disp: 1 Device, Rfl: 0    sucralfate (CARAFATE) 1 g tablet, Take 1 tablet (1 g total) by mouth 4 (four) times a day as needed (Abdominal pain) for up to 5 doses (Patient not taking: Reported on 8/3/2024), Disp: 5 tablet, Rfl: 0    terbinafine (LamISIL) 1 % cream, Apply topically 2 (two) times a day, Disp: 42 g, Rfl: 0        Whom besides the patient is providing clinical information about today's encounter?   NO ADDITIONAL HISTORIAN (patient alone provided history)    Physical Exam and  "Assessment/Plan by Diagnosis:    Patient has a monoallelic mutation of MITF gene and presents for a full body skin exam. Annual skin checks advised.    ADAN ANGIOMAS  Physical Exam:  Anatomic Location Affected:  Trunk and extremities  Morphological Description:  Scattered cherry red papules  Denies pain, itch, bleeding. No treatments tried. Present for years. Present constantly; no modifying factors which make it worse or better.     Assessment and Plan:  Based on a thorough discussion of this condition and the management approach to it (including a comprehensive discussion of the known risks, side effects and potential benefits of treatment), the patient (family) agrees to implement the following specific plan:  Reassure benign        SEBORRHEIC KERATOSIS; NON-INFLAMED  Physical Exam:  Anatomic Location Affected:  Trunk and extremities  Morphological Description:  Waxy, smooth to warty textured, yellow to brownish-grey to dark brown to blackish, discrete, \"stuck-on\" appearing papules.  Present for years. Denies pain, itch, bleeding.      Additional History of Present Condition:  Present constantly; no modifying factors which make it worse or better. No prior treatment.       Assessment and Plan:  Based on a thorough discussion of this condition and the management approach to it (including a comprehensive discussion of the known risks, side effects and potential benefits of treatment), the patient (family) agrees to implement the following specific plan:  Reassure benign  Use sun protection.  Apply SPF 30 or higher at least three times a day.  Wear sun protecting clothing and hats.        SOLAR LENTIGINES   OTHER SKIN CHANGES DUE TO CHRONIC EXPOSURE TO NONIONIZING RADIATION  Physical Exam:  Anatomic Location Affected:  Sun exposed areas of back, chest, arms, legs  Morphological Description:  Multiple scattered brown to tan evenly pigmented macules   Denies pain, itch, bleeding. No treatments tried. Present for " "months - years. Reports getting newer lesions with sun exposure.      Assessment and Plan:  Based on a thorough discussion of this condition and the management approach to it (including a comprehensive discussion of the known risks, side effects and potential benefits of treatment), the patient (family) agrees to implement the following specific plan:  Reassure benign  Use sun protection.  Apply SPF 30 or higher at least three times a day.  Wear sun protecting clothing and hats.         MULTIPLE MELANOCYTIC NEVI (\"Moles\")  Physical Exam:  Anatomic Location Affected: Trunk and extremities  Morphological Description:  Scattered, round to ovoid, symmetrical-appearing, even bordered, skin colored to dark brown macules/papules  Denies pain, itch, bleeding. No treatments tried. Present for years. Present constantly; no modifying factors which make it worse or better. Denies actively changing or growing moles.      Assessment and Plan:  Based on a thorough discussion of this condition and the management approach to it (including a comprehensive discussion of the known risks, side effects and potential benefits of treatment), the patient (family) agrees to implement the following specific plan:  Reassure benign  Monitor for changes  Use sun protection.  Apply SPF 30 or higher at least three times a day.  Wear sun protecting clothing and hats.     Worrisome signs of skin malignancy discussed, questions answered. Regular self-skin check discussed. Advised to call or return to office if patient notices any spots of concern, rapidly growing/changing lesions, bleeding lesions, non-healing lesions. Advised regular SPF use.        Scribe Attestation      I,:  Sena Ortiz MA am acting as a scribe while in the presence of the attending physician.:       I,:  Brett Box MD personally performed the services described in this documentation    as scribed in my presence.:           Hannah Hobson MD  Dermatology, PGY-2    "

## 2025-05-08 ENCOUNTER — RA CDI HCC (OUTPATIENT)
Dept: OTHER | Facility: HOSPITAL | Age: 50
End: 2025-05-08

## 2025-05-08 NOTE — PROGRESS NOTES
HCC coding opportunities          Chart Reviewed number of suggestions sent to Provider: 1    E66.01, Z68.41     Patients Insurance        Commercial Insurance: [employer]

## 2025-05-14 ENCOUNTER — OFFICE VISIT (OUTPATIENT)
Dept: FAMILY MEDICINE CLINIC | Facility: CLINIC | Age: 50
End: 2025-05-14
Payer: COMMERCIAL

## 2025-05-14 VITALS
DIASTOLIC BLOOD PRESSURE: 80 MMHG | TEMPERATURE: 97.2 F | BODY MASS INDEX: 44.31 KG/M2 | HEART RATE: 86 BPM | HEIGHT: 59 IN | WEIGHT: 219.8 LBS | OXYGEN SATURATION: 99 % | SYSTOLIC BLOOD PRESSURE: 122 MMHG

## 2025-05-14 DIAGNOSIS — J45.909 ASTHMA, UNSPECIFIED ASTHMA SEVERITY, UNSPECIFIED WHETHER COMPLICATED, UNSPECIFIED WHETHER PERSISTENT: ICD-10-CM

## 2025-05-14 DIAGNOSIS — M18.12 ARTHRITIS OF CARPOMETACARPAL (CMC) JOINT OF LEFT THUMB: ICD-10-CM

## 2025-05-14 DIAGNOSIS — M65.4 DE QUERVAIN'S TENOSYNOVITIS, LEFT: ICD-10-CM

## 2025-05-14 DIAGNOSIS — R73.9 HYPERGLYCEMIA: ICD-10-CM

## 2025-05-14 DIAGNOSIS — E78.5 DYSLIPIDEMIA: ICD-10-CM

## 2025-05-14 DIAGNOSIS — G62.9 NEUROPATHY: Primary | ICD-10-CM

## 2025-05-14 PROCEDURE — 99214 OFFICE O/P EST MOD 30 MIN: CPT | Performed by: FAMILY MEDICINE

## 2025-05-14 RX ORDER — ALBUTEROL SULFATE 90 UG/1
2 INHALANT RESPIRATORY (INHALATION) EVERY 4 HOURS PRN
Qty: 18 G | Refills: 3 | Status: SHIPPED | OUTPATIENT
Start: 2025-05-14

## 2025-05-14 NOTE — ASSESSMENT & PLAN NOTE
Orders:    albuterol (ProAir HFA) 90 mcg/act inhaler; Inhale 2 puffs every 4 (four) hours as needed for wheezing or shortness of breath

## 2025-05-14 NOTE — PROGRESS NOTES
Name: Jyoti Cedeno      : 1975      MRN: [id number]  Encounter Provider: Kodi Mcgowan MD  Encounter Date: 2025   Encounter department: [facility]    Assessment & Plan  Neuropathy  Refilled Zoloft for neuropathy, numbness and tingling seems to be stable  Orders:    sertraline (ZOLOFT) 50 mg tablet; Take 1 tablet (50 mg total) by mouth daily at bedtime    Arthritis of carpometacarpal (CMC) joint of left thumb  Refill diclofenac  Orders:    Diclofenac Sodium (VOLTAREN) 1 %; Apply 2 g topically 4 (four) times a day    De Quervain's tenosynovitis, left    Orders:    Diclofenac Sodium (VOLTAREN) 1 %; Apply 2 g topically 4 (four) times a day    Asthma, unspecified asthma severity, unspecified whether complicated, unspecified whether persistent    Orders:    albuterol (ProAir HFA) 90 mcg/act inhaler; Inhale 2 puffs every 4 (four) hours as needed for wheezing or shortness of breath    Asthma, unspecified asthma severity, unspecified whether complicated, unspecified whether persistent    Orders:    albuterol (ProAir HFA) 90 mcg/act inhaler; Inhale 2 puffs every 4 (four) hours as needed for wheezing or shortness of breath    Dyslipidemia  If LDL is above 160 consider starting statin to optimize cholesterol  Orders:    Lipid panel; Future    Hyperglycemia  Repeat hemoglobin A1c  Orders:    Hemoglobin A1C; Future         History of Present Illness       HPI    Jyoti is a 50-year-old female patient presents for follow-up regarding her current 2 conditions.  Patient was last seen in March for her ankle pain.  Reports this interval improvement, is interested in refill of diclofenac 1% ointment for the affected area.  Patient denies any significant asthma exacerbation, do not have any albuterol inhaler left, requesting refill.  Patient has been modifying her diet, taking red yeast rice and pur-er tea help lower cholesterol levels.  Most recent blood work completed in November shows  elevated LDL levels.  Interested in recheck.    Review of Systems   Constitutional:  Negative for chills and fever.   HENT:  Negative for congestion, rhinorrhea and sore throat.    Respiratory:  Negative for chest tightness and shortness of breath.    Cardiovascular:  Negative for chest pain.   Gastrointestinal:  Negative for abdominal pain, constipation, diarrhea, nausea and vomiting.   Neurological:  Negative for dizziness, light-headedness and headaches.       Past Medical History:   Diagnosis Date    Asthma 01/1993    Complex partial epilepsy (HCC)     Seisure free since age 18     Diverticulitis of colon 11/92    Epilepsy (HCC)     Factor 5 Leiden mutation, heterozygous (HCC)     Fracture, patella     Lupus 3/29/2002    Pregnancy     Resulted in 1 Living child     Seizures (HCC) 11/92    TIA (transient ischemic attack) 1/1/93     Past Surgical History:   Procedure Laterality Date    ABDOMINAL ADHESION SURGERY N/A 09/29/2023    Procedure: LYSIS ADHESIONS;  Surgeon: Maite Maldonado MD;  Location: [facility];  Service: Gynecology    BACK SURGERY      BREAST EXCISIONAL BIOPSY Left 02/14/2004    benign    HYSTERECTOMY  9/29/23    MAMMO STEREOTACTIC BREAST BIOPSY LEFT (ALL INC) Left 03/08/2023    NASAL SEPTUM SURGERY      Deviation Repair     MS CYSTOURETHROSCOPY N/A 09/29/2023    Procedure: CYSTOSCOPY;  Surgeon: Maite Maldonado MD;  Location: [facility];  Service: Gynecology    MS LAPS TOTAL HYSTERECT 250 GM/< W/RMVL TUBE/OVARY Bilateral 09/29/2023    Procedure: HYSTERECTOMY LAPAROSCOPIC TOTAL  WITH BILATERAL SALPINGO-OOPHERECTOMY;  Surgeon: Maite Maldonado MD;  Location: [facility];  Service: Gynecology    RHINOPLASTY       Family History   Problem Relation Age of Onset    Liver cancer Mother         age dx unknown    Colon cancer Mother 74    Cancer Mother         Liver and lung cancer    Heart attack Father     Hypertension Father     Heart disease Father     No Known Problems Maternal Grandmother     No  Known Problems Maternal Grandfather     Breast cancer Paternal Grandmother         age dx unknown    Colon cancer Paternal Grandmother         age dx unknown    Stomach cancer Paternal Grandfather         age dx unknown    Multiple myeloma Brother     No Known Problems Brother     No Known Problems Brother     No Known Problems Son     Breast cancer Maternal Aunt         age dx unknown    No Known Problems Maternal Aunt     No Known Problems Maternal Aunt     No Known Problems Paternal Aunt     Ovarian cancer Neg Hx     Uterine cancer Neg Hx     Cervical cancer Neg Hx      Social History     Tobacco Use    Smoking status: Never    Smokeless tobacco: Never   Vaping Use    Vaping status: Never Used   Substance and Sexual Activity    Alcohol use: Yes     Alcohol/week: 7.0 standard drinks of alcohol     Types: 5 Glasses of wine, 2 Shots of liquor per week     Comment: 1-2 glasses of wine/ day    Drug use: Never    Sexual activity: Yes     Partners: Male     Birth control/protection: Female Sterilization     Comment:      Medications[1]  Allergies   Allergen Reactions    Carbamazepine Hives    Cephalexin Hives    Flurbiprofen Fatigue    Phenytoin Hives     Immunization History   Administered Date(s) Administered    COVID-19 PFIZER VACCINE 0.3 ML IM 04/25/2021, 05/16/2021, 12/26/2021    COVID-19 Pfizer mRNA vacc PF joey-sucrose 12 yr and older (Comirnaty) 11/19/2023, 10/06/2024    Hep B, Adolescent or Pediatric 09/15/2014, 10/15/2014, 04/18/2015    Hep B, adult 09/15/2014    INFLUENZA 09/26/2014, 09/19/2015, 09/24/2015, 11/16/2016, 10/12/2018, 10/10/2020, 12/02/2021, 11/19/2023    Influenza Injectable, MDCK, Preservative Free, 0.5 mL 10/06/2024    Influenza Injectable, MDCK, Preservative Free, Quadrivalent, 0.5 mL 11/19/2023    Influenza Quadrivalent Preservative Free 3 years and older IM 11/16/2016    Influenza, seasonal, injectable 09/28/2013, 09/19/2015, 09/24/2015    Influenza, seasonal, injectable,  "preservative free 09/26/2014    Tuberculin Skin Test-PPD Intradermal 01/27/2017     Objective   /80 (BP Location: Left arm, Patient Position: Sitting, Cuff Size: Large)   Pulse 86   Temp (!) 97.2 °F (36.2 °C) (Temporal)   Ht 4' 11\" (1.499 m)   Wt 99.7 kg (219 lb 12.8 oz)   LMP 08/21/2023 (Exact Date) Comment: denies preg  SpO2 99%   BMI 44.39 kg/m²     Physical Exam  Vitals reviewed.   Constitutional:       General: She is not in acute distress.     Appearance: Normal appearance. She is obese. She is not ill-appearing, toxic-appearing or diaphoretic.     Cardiovascular:      Rate and Rhythm: Normal rate.      Pulses: Normal pulses.   Pulmonary:      Effort: Pulmonary effort is normal.   Abdominal:      General: Abdomen is flat.     Musculoskeletal:         General: No swelling, tenderness, deformity or signs of injury.     Skin:     General: Skin is warm and dry.      Capillary Refill: Capillary refill takes less than 2 seconds.      Coloration: Skin is not jaundiced.     Neurological:      General: No focal deficit present.      Mental Status: She is alert and oriented to person, place, and time.     Psychiatric:         Mood and Affect: Mood normal.         Kodi Mcgowan M.D.  Family Medicine    Please excuse any \"sound-alike\" errors that may have ocurred during the process of dictation. Parts of this note have been dictated and there may be errors present in the transcription process. Thank you.         [1]   Current Outpatient Medications on File Prior to Visit   Medication Sig    calcium carbonate (OS-MELISSA) 1250 (500 Ca) MG tablet Take 1 tablet (1,250 mg total) by mouth daily    cyclobenzaprine (FLEXERIL) 5 mg tablet Take 1 tablet (5 mg total) by mouth 3 (three) times a day as needed for muscle spasms    ergocalciferol (VITAMIN D2) 50,000 units TAKE 1 CAPSULE BY MOUTH ONE TIME PER WEEK    gabapentin (NEURONTIN) 100 mg capsule Take 1 capsule (100 mg total) by mouth daily at bedtime    ibuprofen (MOTRIN) " 600 mg tablet Take 1 tablet (600 mg total) by mouth every 8 (eight) hours as needed for moderate pain    ondansetron (ZOFRAN) 4 mg tablet Take 1 tablet (4 mg total) by mouth every 8 (eight) hours as needed for nausea or vomiting for up to 12 doses    psyllium (METAMUCIL) 0.52 g capsule Take 0.52 g by mouth in the morning.    Red Yeast Rice 600 MG TABS Take by mouth    Spacer/Aero Chamber Mouthpiece (SPACER DEVICE) for metered dose inhaler For use with metered dose inhaler. Optichamber Mary VHC    [DISCONTINUED] albuterol (ProAir HFA) 90 mcg/act inhaler Inhale 2 puffs every 4 (four) hours as needed for wheezing or shortness of breath    [DISCONTINUED] Diclofenac Sodium (VOLTAREN) 1 % Apply 2 g topically 4 (four) times a day    [DISCONTINUED] sertraline (ZOLOFT) 50 mg tablet Take 1 tablet (50 mg total) by mouth daily    sucralfate (CARAFATE) 1 g tablet Take 1 tablet (1 g total) by mouth 4 (four) times a day as needed (Abdominal pain) for up to 5 doses (Patient not taking: Reported on 8/3/2024)    terbinafine (LamISIL) 1 % cream Apply topically 2 (two) times a day (Patient not taking: Reported on 5/14/2025)

## 2025-05-14 NOTE — ASSESSMENT & PLAN NOTE
Refill diclofenac  Orders:    Diclofenac Sodium (VOLTAREN) 1 %; Apply 2 g topically 4 (four) times a day

## 2025-05-14 NOTE — ASSESSMENT & PLAN NOTE
If LDL is above 160 consider starting statin to optimize cholesterol  Orders:    Lipid panel; Future

## 2025-05-22 DIAGNOSIS — M54.50 LOW BACK PAIN, UNSPECIFIED BACK PAIN LATERALITY, UNSPECIFIED CHRONICITY, UNSPECIFIED WHETHER SCIATICA PRESENT: ICD-10-CM

## 2025-05-22 DIAGNOSIS — M62.838 MUSCLE SPASM: ICD-10-CM

## 2025-05-22 RX ORDER — IBUPROFEN 600 MG/1
TABLET, FILM COATED ORAL
Qty: 90 TABLET | Refills: 1 | Status: SHIPPED | OUTPATIENT
Start: 2025-05-22

## 2025-08-08 ENCOUNTER — APPOINTMENT (OUTPATIENT)
Dept: LAB | Facility: MEDICAL CENTER | Age: 50
End: 2025-08-08
Payer: COMMERCIAL

## (undated) DEVICE — SUT MONOCRYL 4-0 PS-2 27 IN Y426H

## (undated) DEVICE — 2, DISPOSABLE SUCTION/IRRIGATOR WITHOUT DISPOSABLE TIP: Brand: STRYKEFLOW

## (undated) DEVICE — TROCAR: Brand: KII SLEEVE

## (undated) DEVICE — ADHESIVE SKIN CLOSURE SYS EXOFIN FUSION 22CM

## (undated) DEVICE — CYSTO TUBING SINGLE IRRIGATION

## (undated) DEVICE — CHLORAPREP HI-LITE 26ML ORANGE

## (undated) DEVICE — MAYO STAND COVER: Brand: CONVERTORS

## (undated) DEVICE — ADHESIVE SKIN HIGH VISCOSITY EXOFIN 1ML

## (undated) DEVICE — SUT STRATAFIX SPIRAL 2-0 CT-1 30 CM SXPP1B410

## (undated) DEVICE — TRAY FOLEY 16FR URIMETER SILICONE SURESTEP

## (undated) DEVICE — STERILE SURGICAL LUBRICANT,  TUBE: Brand: SURGILUBE

## (undated) DEVICE — SUT VICRYL 0 UR-6 27 IN J603H

## (undated) DEVICE — SYRINGE 50ML LL

## (undated) DEVICE — ELECTRODE LAP SPATULA CRV E-Z CLEAN 33CM -0018C

## (undated) DEVICE — OCCLUDER COLPO-PNEUMO

## (undated) DEVICE — INSUFLATION TUBING INSUFLOW (LEXION)

## (undated) DEVICE — GLOVE INDICATOR PI UNDERGLOVE SZ 6.5 BLUE

## (undated) DEVICE — UTERINE MANIPULATOR RUMI 6.7 X 10 CM

## (undated) DEVICE — GLOVE SRG LF STRL BGL SKNSNS 6 PF

## (undated) DEVICE — INTENDED FOR TISSUE SEPARATION, AND OTHER PROCEDURES THAT REQUIRE A SHARP SURGICAL BLADE TO PUNCTURE OR CUT.: Brand: BARD-PARKER SAFETY BLADES SIZE 11, STERILE

## (undated) DEVICE — TROCAR: Brand: KII FIOS FIRST ENTRY

## (undated) DEVICE — Device

## (undated) DEVICE — ENDOPATH BIPOLAR FORCEPS WITH MACRO JAW: Brand: ENDOPATH

## (undated) DEVICE — INSUFFLATION NEEDLE TO ESTABLISH PNEUMOPERITONEUM.: Brand: INSUFFLATION NEEDLE

## (undated) DEVICE — PREMIUM DRY TRAY LF: Brand: MEDLINE INDUSTRIES, INC.

## (undated) DEVICE — 1820 FOAM BLOCK NEEDLE COUNTER: Brand: DEVON

## (undated) DEVICE — PENCIL ELECTROSURG E-Z CLEAN -0035H

## (undated) DEVICE — BETHLEHEM UNIVERSAL GYN LAP PK: Brand: CARDINAL HEALTH

## (undated) DEVICE — HARMONIC 1100 SHEARS, 36CM SHAFT LENGTH: Brand: HARMONIC

## (undated) DEVICE — CHLORHEXIDINE 4PCT 4 OZ